# Patient Record
Sex: MALE | Race: BLACK OR AFRICAN AMERICAN | Employment: UNEMPLOYED | URBAN - METROPOLITAN AREA
[De-identification: names, ages, dates, MRNs, and addresses within clinical notes are randomized per-mention and may not be internally consistent; named-entity substitution may affect disease eponyms.]

---

## 2017-04-05 ENCOUNTER — APPOINTMENT (OUTPATIENT)
Dept: GENERAL RADIOLOGY | Age: 32
End: 2017-04-05
Attending: EMERGENCY MEDICINE
Payer: MEDICAID

## 2017-04-05 ENCOUNTER — HOSPITAL ENCOUNTER (EMERGENCY)
Age: 32
Discharge: HOME | End: 2017-04-05
Attending: EMERGENCY MEDICINE
Payer: MEDICAID

## 2017-04-05 ENCOUNTER — APPOINTMENT (OUTPATIENT)
Dept: CT IMAGING | Age: 32
End: 2017-04-05
Attending: EMERGENCY MEDICINE
Payer: MEDICAID

## 2017-04-05 VITALS
HEIGHT: 77 IN | BODY MASS INDEX: 23.14 KG/M2 | HEART RATE: 70 BPM | TEMPERATURE: 97.9 F | WEIGHT: 196 LBS | SYSTOLIC BLOOD PRESSURE: 149 MMHG | RESPIRATION RATE: 16 BRPM | DIASTOLIC BLOOD PRESSURE: 89 MMHG | OXYGEN SATURATION: 98 %

## 2017-04-05 DIAGNOSIS — R60.0 BILATERAL EDEMA OF LOWER EXTREMITY: ICD-10-CM

## 2017-04-05 DIAGNOSIS — R79.89 ELEVATED LFTS: Primary | ICD-10-CM

## 2017-04-05 LAB
ALBUMIN SERPL BCP-MCNC: 3.2 G/DL (ref 3.5–5)
ALBUMIN/GLOB SERPL: 1 {RATIO} (ref 1.1–2.2)
ALP SERPL-CCNC: 76 U/L (ref 45–117)
ALT SERPL-CCNC: 165 U/L (ref 12–78)
ANION GAP BLD CALC-SCNC: 8 MMOL/L (ref 5–15)
APPEARANCE UR: CLEAR
AST SERPL W P-5'-P-CCNC: 91 U/L (ref 15–37)
ATRIAL RATE: 58 BPM
BACTERIA URNS QL MICRO: NEGATIVE /HPF
BASOPHILS # BLD AUTO: 0 K/UL (ref 0–0.1)
BASOPHILS # BLD: 0 % (ref 0–1)
BILIRUB SERPL-MCNC: 0.5 MG/DL (ref 0.2–1)
BILIRUB UR QL: NEGATIVE
BUN SERPL-MCNC: 6 MG/DL (ref 6–20)
BUN/CREAT SERPL: 8 (ref 12–20)
CALCIUM SERPL-MCNC: 8.2 MG/DL (ref 8.5–10.1)
CALCULATED P AXIS, ECG09: 52 DEGREES
CALCULATED R AXIS, ECG10: 0 DEGREES
CALCULATED T AXIS, ECG11: 20 DEGREES
CHLORIDE SERPL-SCNC: 107 MMOL/L (ref 97–108)
CO2 SERPL-SCNC: 25 MMOL/L (ref 21–32)
COLOR UR: NORMAL
CREAT SERPL-MCNC: 0.8 MG/DL (ref 0.7–1.3)
D DIMER PPP FEU-MCNC: 1 MG/L FEU (ref 0–0.65)
DIAGNOSIS, 93000: NORMAL
EOSINOPHIL # BLD: 0.3 K/UL (ref 0–0.4)
EOSINOPHIL NFR BLD: 3 % (ref 0–7)
EPITH CASTS URNS QL MICRO: NORMAL /LPF
ERYTHROCYTE [DISTWIDTH] IN BLOOD BY AUTOMATED COUNT: 14.6 % (ref 11.5–14.5)
GLOBULIN SER CALC-MCNC: 3.3 G/DL (ref 2–4)
GLUCOSE SERPL-MCNC: 81 MG/DL (ref 65–100)
GLUCOSE UR STRIP.AUTO-MCNC: NEGATIVE MG/DL
HCT VFR BLD AUTO: 35.9 % (ref 36.6–50.3)
HGB BLD-MCNC: 11.4 G/DL (ref 12.1–17)
HGB UR QL STRIP: NEGATIVE
HYALINE CASTS URNS QL MICRO: NORMAL /LPF (ref 0–5)
KETONES UR QL STRIP.AUTO: NEGATIVE MG/DL
LEUKOCYTE ESTERASE UR QL STRIP.AUTO: NEGATIVE
LYMPHOCYTES # BLD AUTO: 29 % (ref 12–49)
LYMPHOCYTES # BLD: 2.5 K/UL (ref 0.8–3.5)
MCH RBC QN AUTO: 31.8 PG (ref 26–34)
MCHC RBC AUTO-ENTMCNC: 31.8 G/DL (ref 30–36.5)
MCV RBC AUTO: 100 FL (ref 80–99)
MONOCYTES # BLD: 1 K/UL (ref 0–1)
MONOCYTES NFR BLD AUTO: 12 % (ref 5–13)
NEUTS SEG # BLD: 4.8 K/UL (ref 1.8–8)
NEUTS SEG NFR BLD AUTO: 56 % (ref 32–75)
NITRITE UR QL STRIP.AUTO: NEGATIVE
P-R INTERVAL, ECG05: 128 MS
PH UR STRIP: 6 [PH] (ref 5–8)
PLATELET # BLD AUTO: 137 K/UL (ref 150–400)
POTASSIUM SERPL-SCNC: 4 MMOL/L (ref 3.5–5.1)
PROT SERPL-MCNC: 6.5 G/DL (ref 6.4–8.2)
PROT UR STRIP-MCNC: NEGATIVE MG/DL
Q-T INTERVAL, ECG07: 428 MS
QRS DURATION, ECG06: 80 MS
QTC CALCULATION (BEZET), ECG08: 420 MS
RBC # BLD AUTO: 3.59 M/UL (ref 4.1–5.7)
RBC #/AREA URNS HPF: NORMAL /HPF (ref 0–5)
SODIUM SERPL-SCNC: 140 MMOL/L (ref 136–145)
SP GR UR REFRACTOMETRY: 1.02 (ref 1–1.03)
UROBILINOGEN UR QL STRIP.AUTO: 1 EU/DL (ref 0.2–1)
VENTRICULAR RATE, ECG03: 58 BPM
WBC # BLD AUTO: 8.7 K/UL (ref 4.1–11.1)
WBC URNS QL MICRO: NORMAL /HPF (ref 0–4)

## 2017-04-05 PROCEDURE — 85379 FIBRIN DEGRADATION QUANT: CPT | Performed by: EMERGENCY MEDICINE

## 2017-04-05 PROCEDURE — 85025 COMPLETE CBC W/AUTO DIFF WBC: CPT | Performed by: EMERGENCY MEDICINE

## 2017-04-05 PROCEDURE — 96361 HYDRATE IV INFUSION ADD-ON: CPT

## 2017-04-05 PROCEDURE — 93970 EXTREMITY STUDY: CPT

## 2017-04-05 PROCEDURE — 80053 COMPREHEN METABOLIC PANEL: CPT | Performed by: EMERGENCY MEDICINE

## 2017-04-05 PROCEDURE — 74011250636 HC RX REV CODE- 250/636: Performed by: EMERGENCY MEDICINE

## 2017-04-05 PROCEDURE — 74011636320 HC RX REV CODE- 636/320: Performed by: RADIOLOGY

## 2017-04-05 PROCEDURE — 96360 HYDRATION IV INFUSION INIT: CPT

## 2017-04-05 PROCEDURE — 36415 COLL VENOUS BLD VENIPUNCTURE: CPT | Performed by: EMERGENCY MEDICINE

## 2017-04-05 PROCEDURE — 71020 XR CHEST PA LAT: CPT

## 2017-04-05 PROCEDURE — 81001 URINALYSIS AUTO W/SCOPE: CPT | Performed by: EMERGENCY MEDICINE

## 2017-04-05 PROCEDURE — 71275 CT ANGIOGRAPHY CHEST: CPT

## 2017-04-05 PROCEDURE — 99283 EMERGENCY DEPT VISIT LOW MDM: CPT

## 2017-04-05 PROCEDURE — 93005 ELECTROCARDIOGRAM TRACING: CPT

## 2017-04-05 RX ORDER — SERTRALINE HYDROCHLORIDE 50 MG/1
50 TABLET, FILM COATED ORAL DAILY
Status: ON HOLD | COMMUNITY
End: 2022-06-23 | Stop reason: SDUPTHER

## 2017-04-05 RX ORDER — FUROSEMIDE 20 MG/1
20 TABLET ORAL DAILY
Qty: 5 TAB | Refills: 0 | Status: SHIPPED | OUTPATIENT
Start: 2017-04-05 | End: 2017-04-07 | Stop reason: SDUPTHER

## 2017-04-05 RX ADMIN — SODIUM CHLORIDE 500 ML: 900 INJECTION, SOLUTION INTRAVENOUS at 12:03

## 2017-04-05 RX ADMIN — IOPAMIDOL 100 ML: 755 INJECTION, SOLUTION INTRAVENOUS at 12:58

## 2017-04-05 NOTE — DISCHARGE INSTRUCTIONS
We hope that we have addressed all of your medical concerns. The examination and treatment you received in the Emergency Department were for an emergent problem and were not intended as complete care. It is important that you follow up with your healthcare provider(s) for ongoing care. If your symptoms worsen or do not improve as expected, and you are unable to reach your usual health care provider(s), you should return to the Emergency Department. Today's healthcare is undergoing tremendous change, and patient satisfaction surveys are one of the many tools to assess the quality of medical care. You may receive a survey from the [employer] regarding your experience in the Emergency Department. I hope that your experience has been completely positive, particularly the medical care that I provided. As such, please participate in the survey; anything less than excellent does not meet my expectations or intentions. [facility] and [address] [place] [address] participate in nationally recognized quality of care measures. If your blood pressure is greater than 120/80, as reported below, we urge that you seek medical care to address the potential of high blood pressure, commonly known as hypertension. Hypertension can be hereditary or can be caused by certain medical conditions, pain, stress, or \"white coat syndrome. \"       Please make an appointment with your health care provider(s) for follow up of your Emergency Department visit. VITALS:   Patient Vitals for the past 8 hrs:   Temp Pulse Resp BP SpO2   04/05/17 1310 - - 16 149/89 98 %   04/05/17 1002 97.9 °F (36.6 °C) 70 20 121/72 100 %          Thank you for allowing us to provide you with medical care today. We realize that you have many choices for your emergency care needs. Please choose us in the future for any continued health care needs.       Regards,           Mahin Caldwell, MD    [facility].   Office: [phone]            Recent Results (from the past 24 hour(s))   CBC WITH AUTOMATED DIFF    Collection Time: 04/05/17 10:52 AM   Result Value Ref Range    WBC 8.7 4.1 - 11.1 K/uL    RBC 3.59 (L) 4.10 - 5.70 M/uL    HGB 11.4 (L) 12.1 - 17.0 g/dL    HCT 35.9 (L) 36.6 - 50.3 %    .0 (H) 80.0 - 99.0 FL    MCH 31.8 26.0 - 34.0 PG    MCHC 31.8 30.0 - 36.5 g/dL    RDW 14.6 (H) 11.5 - 14.5 %    PLATELET 573 (L) 247 - 400 K/uL    NEUTROPHILS 56 32 - 75 %    LYMPHOCYTES 29 12 - 49 %    MONOCYTES 12 5 - 13 %    EOSINOPHILS 3 0 - 7 %    BASOPHILS 0 0 - 1 %    ABS. NEUTROPHILS 4.8 1.8 - 8.0 K/UL    ABS. LYMPHOCYTES 2.5 0.8 - 3.5 K/UL    ABS. MONOCYTES 1.0 0.0 - 1.0 K/UL    ABS. EOSINOPHILS 0.3 0.0 - 0.4 K/UL    ABS. BASOPHILS 0.0 0.0 - 0.1 K/UL   METABOLIC PANEL, COMPREHENSIVE    Collection Time: 04/05/17 10:52 AM   Result Value Ref Range    Sodium 140 136 - 145 mmol/L    Potassium 4.0 3.5 - 5.1 mmol/L    Chloride 107 97 - 108 mmol/L    CO2 25 21 - 32 mmol/L    Anion gap 8 5 - 15 mmol/L    Glucose 81 65 - 100 mg/dL    BUN 6 6 - 20 MG/DL    Creatinine 0.80 0.70 - 1.30 MG/DL    BUN/Creatinine ratio 8 (L) 12 - 20      GFR est AA >60 >60 ml/min/1.73m2    GFR est non-AA >60 >60 ml/min/1.73m2    Calcium 8.2 (L) 8.5 - 10.1 MG/DL    Bilirubin, total 0.5 0.2 - 1.0 MG/DL    ALT (SGPT) 165 (H) 12 - 78 U/L    AST (SGOT) 91 (H) 15 - 37 U/L    Alk.  phosphatase 76 45 - 117 U/L    Protein, total 6.5 6.4 - 8.2 g/dL    Albumin 3.2 (L) 3.5 - 5.0 g/dL    Globulin 3.3 2.0 - 4.0 g/dL    A-G Ratio 1.0 (L) 1.1 - 2.2     URINALYSIS W/MICROSCOPIC    Collection Time: 04/05/17 10:52 AM   Result Value Ref Range    Color YELLOW/STRAW      Appearance CLEAR CLEAR      Specific gravity 1.022 1.003 - 1.030      pH (UA) 6.0 5.0 - 8.0      Protein NEGATIVE  NEG mg/dL    Glucose NEGATIVE  NEG mg/dL    Ketone NEGATIVE  NEG mg/dL    Bilirubin NEGATIVE  NEG      Blood NEGATIVE  NEG      Urobilinogen 1.0 0.2 - 1.0 EU/dL    Nitrites NEGATIVE  NEG      Leukocyte Esterase NEGATIVE  NEG      WBC 0-4 0 - 4 /hpf    RBC 0-5 0 - 5 /hpf    Epithelial cells FEW FEW /lpf    Bacteria NEGATIVE  NEG /hpf    Hyaline cast 0-2 0 - 5 /lpf   D DIMER    Collection Time: 04/05/17 10:57 AM   Result Value Ref Range    D-dimer 1.00 (H) 0.00 - 0.65 mg/L FEU   EKG, 12 LEAD, INITIAL    Collection Time: 04/05/17 11:04 AM   Result Value Ref Range    Ventricular Rate 58 BPM    Atrial Rate 58 BPM    P-R Interval 128 ms    QRS Duration 80 ms    Q-T Interval 428 ms    QTC Calculation (Bezet) 420 ms    Calculated P Axis 52 degrees    Calculated R Axis 0 degrees    Calculated T Axis 20 degrees    Diagnosis       Sinus bradycardia  Otherwise normal ECG  No previous ECGs available         Xr Chest Pa Lat    Result Date: 4/5/2017  INDICATION: Back pain and swollen feet for 5 days FINDINGS: PA and lateral views of the chest demonstrate a normal cardiomediastinal silhouette and clear lungs bilaterally. The visualized osseous structures are unremarkable. IMPRESSION: Normal chest.     Cta Chest W Or W Wo Cont    Result Date: 4/5/2017  EXAM:  CTA CHEST W OR W WO CONT INDICATION:   elevated d-dimer travel sob. Bilateral feet swelling onset 4-5 days ago that is now moving up his legs, says it \"hurts to walk. \" Pt states his feet swelling is accompanied by chest pain a couple days ago (not currently), intermittent SOB, and lower back pain. COMPARISON: Chest x-ray 4/5/2017. CONTRAST:  90 mL of Isovue-370. TECHNIQUE: Precontrast  images were obtained to localize the volume for acquisition. Multislice helical CT arteriography was performed from the diaphragm to the thoracic inlet during uneventful rapid bolus intravenous contrast administration. Lung and soft tissue windows were generated. Coronal and sagittal images were generated and 3D post processing consisting of coronal maximum intensity images was performed.   CT dose reduction was achieved through use of a standardized protocol tailored for this examination and automatic exposure control for dose modulation. FINDINGS: The lungs are clear of mass, nodule, airspace disease or edema. The pulmonary arteries are well enhanced and no pulmonary emboli are identified. There is no mediastinal or hilar adenopathy or mass. The heart is normal in size without pericardial effusion. The aorta enhances normally without evidence of aneurysm or dissection. The visualized portions of the upper abdominal organs are normal.     IMPRESSION: No pulmonary embolus or other acute finding. Leg and Ankle Edema: Care Instructions  Your Care Instructions  Swelling in the legs, ankles, and feet is called edema. It is common after you sit or stand for a while. Long plane flights or car rides often cause swelling in the legs and feet. You may also have swelling if you have to stand for long periods of time at your job. Problems with the veins in the legs (varicose veins) and changes in hormones can also cause swelling. Sometimes the swelling in the ankles and feet is caused by a more serious problem, such as heart failure, infection, blood clots, or liver or kidney disease. Follow-up care is a key part of your treatment and safety. Be sure to make and go to all appointments, and call your doctor if you are having problems. Its also a good idea to know your test results and keep a list of the medicines you take. How can you care for yourself at home? · If your doctor gave you medicine, take it as prescribed. Call your doctor if you think you are having a problem with your medicine. · Whenever you are resting, raise your legs up. Try to keep the swollen area higher than the level of your heart. · Take breaks from standing or sitting in one position. ¨ Walk around to increase the blood flow in your lower legs. ¨ Move your feet and ankles often while you stand, or tighten and relax your leg muscles. · Wear support stockings.  Put them on in the morning, before swelling gets worse. · Eat a balanced diet. Lose weight if you need to. · Limit the amount of salt (sodium) in your diet. Salt holds fluid in the body and may increase swelling. When should you call for help? Call 911 anytime you think you may need emergency care. For example, call if:  · You have symptoms of a blood clot in your lung (called a pulmonary embolism). These may include:  ¨ Sudden chest pain. ¨ Trouble breathing. ¨ Coughing up blood. Call your doctor now or seek immediate medical care if:  · You have signs of a blood clot, such as:  ¨ Pain in your calf, back of the knee, thigh, or groin. ¨ Redness and swelling in your leg or groin. · You have symptoms of infection, such as:  ¨ Increased pain, swelling, warmth, or redness. ¨ Red streaks or pus. ¨ A fever. Watch closely for changes in your health, and be sure to contact your doctor if:  · Your swelling is getting worse. · You have new or worsening pain in your legs. · You do not get better as expected. Where can you learn more? Go to http://cara-latonya.info/. Enter X738 in the search box to learn more about \"Leg and Ankle Edema: Care Instructions. \"  Current as of: May 27, 2016  Content Version: 11.2  © 0921-4567 [employer]. Care instructions adapted under license by [employer] (which disclaims liability or warranty for this information). If you have questions about a medical condition or this instruction, always ask your healthcare professional. [place] [address] any warranty or liability for your use of this information.

## 2017-04-05 NOTE — ED PROVIDER NOTES
HPI Comments: 36 y.o. male with past medical history significant for depression, anxiety, bipolar disorder, and childhood asthma who presents to the ED with chief complaint of feet swelling. Pt reports bilateral feet swelling onset 4-5 days ago that is now moving up his legs, says it \"hurts to walk. \" Pt states his feet swelling is accompanied by chest pain a couple days ago (not currently), intermittent SOB, and lower back pain. Pt states his back pain does not radiate and is exacerbated by bending. Pt denies hx of kidney or liver issues. Pt denies hx of EtOH withdrawal sx. Pt denies any recent travels (last flew on plane in January). Pt denies urinary sx, bowel issues, blood in stool, or hematuria. Denies bowel or bladder incontinence. There are no other acute medical complaints voiced at this time. Social Hx: Uses EtOH (3-4 drinks per week). Smoker. PCP: None    Note written by Lashae Henriquez, as dictated by Dalila Jurado MD 10:51 AM     The history is provided by the patient and a relative. Past Medical History:   Diagnosis Date    Anxiety     Bipolar 1 disorder ([facility] [address]     Depression        No past surgical history on file. No family history on file. Social History     Social History    Marital status:      Spouse name: N/A    Number of children: N/A    Years of education: N/A     Occupational History    Not on file. Social History Main Topics    Smoking status: Never Smoker    Smokeless tobacco: Not on file    Alcohol use No    Drug use: Not on file    Sexual activity: Not on file     Other Topics Concern    Not on file     Social History Narrative    No narrative on file         ALLERGIES: Peanut and Tree nut    Review of Systems   Respiratory: Positive for shortness of breath. Cardiovascular: Positive for chest pain and leg swelling. Gastrointestinal: Negative for blood in stool, constipation and diarrhea.    Genitourinary: Negative for decreased urine volume, difficulty urinating, dysuria, hematuria and urgency. Musculoskeletal: Positive for back pain (lower). All other systems reviewed and are negative. Vitals:    04/05/17 1002   BP: 121/72   Pulse: 70   Resp: 20   Temp: 97.9 °F (36.6 °C)   SpO2: 100%   Weight: 88.9 kg (196 lb)   Height: 6' 5\" (1.956 m)            Physical Exam   Constitutional: He is oriented to person, place, and time. He appears well-developed and well-nourished. No distress. HENT:   Head: Normocephalic and atraumatic. Eyes: Conjunctivae are normal.   Neck: Normal range of motion. Neck supple. Cardiovascular: Normal rate, regular rhythm, normal heart sounds and intact distal pulses. Exam reveals no friction rub. No murmur heard. Pulmonary/Chest: Effort normal and breath sounds normal. No respiratory distress. He has no wheezes. He has no rales. He exhibits no tenderness. Abdominal: Soft. Bowel sounds are normal. He exhibits no distension. There is no tenderness. There is no rebound and no guarding. Musculoskeletal: He exhibits no edema or tenderness. No midline T or L spine ttp or step off; 2+ pitting edema b/l LE   Neurological: He is alert and oriented to person, place, and time. No cranial nerve deficit. Coordination normal.   Skin: Skin is warm and dry. He is not diaphoretic. No pallor. Psychiatric: He has a normal mood and affect. His behavior is normal.   Nursing note and vitals reviewed. MDM  Number of Diagnoses or Management Options  Bilateral edema of lower extremity:   Elevated LFTs:   Diagnosis management comments: Check creatinine and urine for nephritic or nephrotic syndrome. Could be liver disease as well though pt reports doesn't drink often.  Given cp and sob eval for fluid overload and will check ddimer though low suspicion for dvt or PE       Amount and/or Complexity of Data Reviewed  Clinical lab tests: ordered and reviewed  Tests in the radiology section of CPT®: ordered and reviewed  Obtain history from someone other than the patient: yes (Sig other)  Independent visualization of images, tracings, or specimens: yes (ekg)    Patient Progress  Patient progress: stable    ED Course       Procedures    EKG interpretation: (Preliminary)  Rhythm: normal sinus rhythm; and regular . Rate (approx.): 60; Axis: normal; P wave: normal; QRS interval: normal ; ST/T wave: non-specific changes;      PROGRESS NOTE:  1:15 PM   Pt denies taking Tylenol at all. Says he used to drink daily and has cut back since. Discussed that sx are likely liver related and will need to f/u with GI and have biopsy. Spoke with pt and significant other. Suggest follow up with cardiology for echo but concern for liver disease. Lasix for a few days and compression hose. He needs further testing and likely liver bx. Does not have pcp so will refer as well.  Return if worsening sx

## 2017-04-05 NOTE — ED NOTES
Patient arrived through triage c/o bilateral leg swelling and pain. Patient does travel by plane often. Patient used to be an everyday drinker now states he drinks occasionally. Patient is resting in clarie bed in view of nurses.

## 2017-04-05 NOTE — PROCEDURES
[address]  *** FINAL REPORT ***    Name: Erwin Fierro  MRN: [id number]    Outpatient  : 1985  HIS Order #: [id number]  [address] [facility] Visit #: [id number]  Date: 2017    TYPE OF TEST: Peripheral Venous Testing    REASON FOR TEST  Limb swelling    Right Leg:-  Deep venous thrombosis:           No  Superficial venous thrombosis:    No  Deep venous insufficiency:        No  Superficial venous insufficiency: No    Left Leg:-  Deep venous thrombosis:           No  Superficial venous thrombosis:    No  Deep venous insufficiency:        No  Superficial venous insufficiency: No      INTERPRETATION/FINDINGS  PROCEDURE:  BILATERAL LE VENOUS DUPLEX. Evaluation of lower extremity veins with ultrasound (B-mode imaging,  pulsed Doppler, color Doppler). Includes the common femoral, deep  femoral, femoral, popliteal, posterior tibial, peroneal, and great  saphenous veins. FINDINGS:  Jade Birch scale and color flow duplex images of the veins in  both lower extremities demonstrate normal compressibility, spontaneous   and augmented flow profiles, and absence of filling defects  throughout the deep and superficial veins in both lower extremities. CONCLUSION:   Technically difficult study due to beins size and limb  swelling. Bilateral lower extremity venous duplex negative for deep  venous thrombosis or thrombophlebitis. ADDITIONAL COMMENTS    I have personally reviewed the data relevant to the interpretation of  this  study. TECHNOLOGIST: Noemi Cervantes RVT  Signed: 2017 12:53 PM    PHYSICIAN: Sarahi Delong.  Tien Singletary MD  Signed: 2017 12:57 PM

## 2017-04-05 NOTE — ED NOTES
Dr. Estefany Montaño reviewed discharge instructions with the patient. The patient verbalized understanding. All questions and concerns were addressed. The patient declined a wheelchair and is discharged ambulatory in the care of family members with instructions and prescriptions in hand. Pt is alert and oriented x 4. Respirations are clear and unlabored.

## 2017-04-07 ENCOUNTER — OFFICE VISIT (OUTPATIENT)
Dept: CARDIOLOGY CLINIC | Age: 32
End: 2017-04-07

## 2017-04-07 VITALS
HEIGHT: 77 IN | OXYGEN SATURATION: 99 % | WEIGHT: 202.6 LBS | SYSTOLIC BLOOD PRESSURE: 104 MMHG | DIASTOLIC BLOOD PRESSURE: 68 MMHG | HEART RATE: 68 BPM | RESPIRATION RATE: 20 BRPM | BODY MASS INDEX: 23.92 KG/M2

## 2017-04-07 DIAGNOSIS — R60.9 EDEMA, UNSPECIFIED TYPE: Primary | ICD-10-CM

## 2017-04-07 DIAGNOSIS — F10.10 ETOH ABUSE: ICD-10-CM

## 2017-04-07 RX ORDER — OLANZAPINE 20 MG/1
20 TABLET ORAL
COMMUNITY
End: 2017-05-15

## 2017-04-07 RX ORDER — FUROSEMIDE 20 MG/1
20 TABLET ORAL DAILY
Qty: 30 TAB | Refills: 6 | Status: SHIPPED | OUTPATIENT
Start: 2017-04-07 | End: 2017-04-10 | Stop reason: SDUPTHER

## 2017-04-07 NOTE — MR AVS SNAPSHOT
Visit Information Date & Time Provider Department Dept. Phone Encounter #  
 4/7/2017 10:00 AM Jason Cobb MD [facility] [phone] [id number] Your Appointments 5/22/2017  9:25 AM  
New Patient with Rojelio Pierre MD  
[facility]) Appt Note: Np est PCP  
 [address] [place] [address] [address] [place] [phone]  
  
   
 [address] [place] [phone] Upcoming Health Maintenance Date Due DTaP/Tdap/Td series (1 - Tdap) 4/12/2008 INFLUENZA AGE 9 TO ADULT 8/1/2016 Allergies as of 4/7/2017  Review Complete On: 4/7/2017 By: Hassel Gaucher Severity Noted Reaction Type Reactions Peanut  09/09/2016    Swelling Tree Nut  09/09/2016    Swelling Current Immunizations  Never Reviewed No immunizations on file. Not reviewed this visit Vitals BP Pulse Resp Height(growth percentile) Weight(growth percentile) SpO2  
 104/68 (BP 1 Location: Left arm) 68 20 6' 5\" (1.956 m) 202 lb 9.6 oz (91.9 kg) 99% BMI Smoking Status 24.02 kg/m2 Current Every Day Smoker Vitals History BMI and BSA Data Body Mass Index Body Surface Area 24.02 kg/m 2 2.23 m 2 Your Updated Medication List  
  
   
This list is accurate as of: 4/7/17 10:31 AM.  Always use your most recent med list.  
  
  
  
  
 Comp. Stocking,Thigh,Long,Large Misc  
1 Package by Does Not Apply route daily. furosemide 20 mg tablet Commonly known as:  LASIX Take 1 Tab by mouth daily. OLANZapine 20 mg tablet Commonly known as:  ZyPREXA Take 20 mg by mouth nightly. ZOLOFT 50 mg tablet Generic drug:  sertraline Take  by mouth daily. Introducing [facility]! [employer] introduces [employer] patient portal. Now you can access parts of your medical record, email your doctor's office, and request medication refills online. 1. In your internet browser, go to [email or web address] 2. Click on the First Time User? Click Here link in the Sign In box. You will see the New Member Sign Up page. 3. Enter your [employer] Access Code exactly as it appears below. You will not need to use this code after youve completed the sign-up process. If you do not sign up before the expiration date, you must request a new code. · Euro[employer] Access Code: [email or web address] Expires: 7/4/2017 11:20 AM 
 
4. Enter the last four digits of your Social Security Number (xxxx) and Date of Birth (mm/dd/yyyy) as indicated and click Submit. You will be taken to the next sign-up page. 5. Create a Inverted Edget ID. This will be your Eurotri login ID and cannot be changed, so think of one that is secure and easy to remember. 6. Create a Eurotri password. You can change your password at any time. 7. Enter your Password Reset Question and Answer. This can be used at a later time if you forget your password. 8. Enter your e-mail address. You will receive e-mail notification when new information is available in [address] 9. Click Sign Up. You can now view and download portions of your medical record. 10. Click the Download Summary menu link to download a portable copy of your medical information. If you have questions, please visit the Frequently Asked Questions section of the [employer] website. Remember, [employer] is NOT to be used for urgent needs. For medical emergencies, dial 911. Now available from your iPhone and Android! Please provide this summary of care documentation to your next provider. Your primary care clinician is listed as NONE. If you have any questions after today's visit, please call [phone].

## 2017-04-07 NOTE — PROGRESS NOTES
yDlon Ware MD    [id number] [facility] [place], [address] [facility]    Office [phone],CKT [phone]  Pager [phone]    Jennie Alvarado is a 36 y.o. male is here for f/u visit for    Primary care physician:  None    There is no problem list on file for this patient. Dear Dr. HAND had the pleasure of seeing  Jennie Alvarado in the office today. Chief complaint:  Chief Complaint   Patient presents with   [facility] Follow Up    Leg Swelling       Assessment:  Edema  Elevated LFT's  ETOH use      Plan:     Patient understands the plan. All questions were answered to the patient's satisfaction. Medication Side Effects and Warnings were discussed with patient: yes  Patient Labs were reviewed and or requested:  yes  Patient Past Records were reviewed and or requested: yes    I appreciate the opportunity to be involved in [address] See note below for details. Please do not hesitate to contact us with questions or concerns. Dylon Ware MD    Cardiac Testing/ Procedures: A. Cardiac Cath/PCI:    B.ECHO/YULISSA:    C.StressNuclear/Stress ECHO/Stress test:    D.Vascular:    E. EP:    F. Miscellaneous:    Subjective:  Jennie Alvarado is a 36 y.o. male who is here for evaluation of edema. ROS:  (bold if positive, if negative)             Medications before admission:    Current Outpatient Prescriptions   Medication Sig Dispense    OLANZapine (ZYPREXA) 20 mg tablet Take 20 mg by mouth nightly.  sertraline (ZOLOFT) 50 mg tablet Take  by mouth daily.  furosemide (LASIX) 20 mg tablet Take 1 Tab by mouth daily. 5 Tab    Comp. Stocking,Thigh,Long,Large misc 1 Package by Does Not Apply route daily. 1 Package     No current facility-administered medications for this visit.         Family History of CAD:    Yes/No    Social History:  Current  Smoker  Yes/No    Physical Exam:  Visit Vitals    /68 (BP 1 Location: Left arm)    Pulse 68    Resp 20    Ht 6' 5\" (1.956 m)    Wt 202 lb 9.6 oz (91.9 kg)    SpO2 99%    BMI 24.02 kg/m2          Gen: Well-developed, well-nourished, in no acute distress  Neck: Supple,No JVD, No Carotid Bruit,   Resp: No accessory muscle use, Clear breath sounds, No rales or rhonchi  Card: Regular Rate,Rythm,Normal S1, S2, No murmurs, rubs or gallop. No thrills.    Abd:  Soft, non-tender, non-distended,BS+,   MSK: No cyanosis  Skin: No rashes    Neuro: moving all four extremities , follows commands appropriately  Psych:  Good insight, oriented to person, place , alert, Nml Affect  LE: No edema    EKG:   Results for orders placed or performed during the hospital encounter of 04/05/17   EKG, 12 LEAD, INITIAL   Result Value Ref Range    Ventricular Rate 58 BPM    Atrial Rate 58 BPM    P-R Interval 128 ms    QRS Duration 80 ms    Q-T Interval 428 ms    QTC Calculation (Bezet) 420 ms    Calculated P Axis 52 degrees    Calculated R Axis 0 degrees    Calculated T Axis 20 degrees    Diagnosis       Sinus bradycardia  Otherwise normal ECG  No previous ECGs available  Confirmed by Trenna Litten MD., Briana ([id number]) on 4/5/2017 8:08:15 PM           LABS:        Lab Results   Component Value Date/Time    WBC 8.7 04/05/2017 10:52 AM    HGB 11.4 04/05/2017 10:52 AM    HCT 35.9 04/05/2017 10:52 AM    PLATELET 137 21/67/6768 10:52 AM     Lab Results   Component Value Date/Time    Sodium 140 04/05/2017 10:52 AM    Potassium 4.0 04/05/2017 10:52 AM    Chloride 107 04/05/2017 10:52 AM    CO2 25 04/05/2017 10:52 AM    Anion gap 8 04/05/2017 10:52 AM    Glucose 81 04/05/2017 10:52 AM    BUN 6 04/05/2017 10:52 AM    Creatinine 0.80 04/05/2017 10:52 AM    BUN/Creatinine ratio 8 04/05/2017 10:52 AM    GFR est AA >60 04/05/2017 10:52 AM    GFR est non-AA >60 04/05/2017 10:52 AM    Calcium 8.2 04/05/2017 10:52 AM       No results found for: APTT  No results found for: INR, PTMR, PTP, PT1, PT2  No components found for: Ritu Gill MD

## 2017-04-10 NOTE — PROGRESS NOTES
Theo Coreas MD    [id number] [place], [address] [facility]    Office [phone],[facility] [phone]  Pager [phone]    Austin Artis is a 36 y.o. male is here for evaluation of edema after recent ED visit    Primary care physician:  None    There is no problem list on file for this patient. Chief complaint:  Chief Complaint   Patient presents with   [facility] Follow Up    Leg Swelling       Assessment:  Edema  Elevated LFT's  Excessive ETOH use      Plan:     Echo   Lasix 20 mg daily. Advised to quit/cut back on Etoh intake  Recently in ED- Cr nml/CTA - no PE  F/u in 3-4 wks      Patient understands the plan. All questions were answered to the patient's satisfaction. Medication Side Effects and Warnings were discussed with patient: yes  Patient Labs were reviewed and or requested:  yes  Patient Past Records were reviewed and or requested: yes    I appreciate the opportunity to be involved in [address] See note below for details. Please do not hesitate to contact us with questions or concerns. Theo Coreas MD    Cardiac Testing/ Procedures: A. Cardiac Cath/PCI:    B.ECHO/YULISSA:    C.StressNuclear/Stress ECHO/Stress test:    D.Vascular:    E. EP:    F. Miscellaneous:    Subjective:  Austin Artis is a 36 y.o. male who has been having swelling lower extremities and went to the emergency room. He had elevated LFTs. He was advised to follow-up with cardiology. No chest pain, dizziness, syncope, dyspnea. Patient is visiting his mother and lives in [facility] with his wife. He admits to drinking alcohol daily including drinking in the morning. No prior cardiac history. ROS:  (bold if positive, if negative)             Medications before admission:    Current Outpatient Prescriptions   Medication Sig Dispense    OLANZapine (ZYPREXA) 20 mg tablet Take 20 mg by mouth nightly.  sertraline (ZOLOFT) 50 mg tablet Take  by mouth daily.      furosemide (LASIX) 20 mg tablet Take 1 Tab by mouth daily. 5 Tab    Comp. Stocking,Thigh,Long,Large misc 1 Package by Does Not Apply route daily. 1 Package     No current facility-administered medications for this visit. Family History of CAD:    No    Social History:  Current  Smoker no    Physical Exam:  Visit Vitals    /68 (BP 1 Location: Left arm)    Pulse 68    Resp 20    Ht 6' 5\" (1.956 m)    Wt 202 lb 9.6 oz (91.9 kg)    SpO2 99%    BMI 24.02 kg/m2          Gen: Well-developed, well-nourished, in no acute distress  Neck: Supple,No JVD, No Carotid Bruit,   Resp: No accessory muscle use, Clear breath sounds, No rales or rhonchi  Card: Regular Rate,Rythm,Normal S1, S2, No murmurs, rubs or gallop. No thrills.    Abd:  Soft, non-tender, non-distended,BS+,   MSK: No cyanosis  Skin: No rashes    Neuro: moving all four extremities , follows commands appropriately  Psych:  Good insight, oriented to person, place , alert, Nml Affect  LE : 1+edema    EKG:   Results for orders placed or performed during the hospital encounter of 04/05/17   EKG, 12 LEAD, INITIAL   Result Value Ref Range    Ventricular Rate 58 BPM    Atrial Rate 58 BPM    P-R Interval 128 ms    QRS Duration 80 ms    Q-T Interval 428 ms    QTC Calculation (Bezet) 420 ms    Calculated P Axis 52 degrees    Calculated R Axis 0 degrees    Calculated T Axis 20 degrees    Diagnosis       Sinus bradycardia  Otherwise normal ECG  No previous ECGs available  Confirmed by Brittany Brooks MD., Briana ([id number]) on 4/5/2017 8:08:15 PM           LABS:        Lab Results   Component Value Date/Time    WBC 8.7 04/05/2017 10:52 AM    HGB 11.4 04/05/2017 10:52 AM    HCT 35.9 04/05/2017 10:52 AM    PLATELET 077 48/20/4315 10:52 AM     Lab Results   Component Value Date/Time    Sodium 140 04/05/2017 10:52 AM    Potassium 4.0 04/05/2017 10:52 AM    Chloride 107 04/05/2017 10:52 AM    CO2 25 04/05/2017 10:52 AM    Anion gap 8 04/05/2017 10:52 AM    Glucose 81 04/05/2017 10:52 AM BUN 6 04/05/2017 10:52 AM    Creatinine 0.80 04/05/2017 10:52 AM    BUN/Creatinine ratio 8 04/05/2017 10:52 AM    GFR est AA >60 04/05/2017 10:52 AM    GFR est non-AA >60 04/05/2017 10:52 AM    Calcium 8.2 04/05/2017 10:52 AM       No results found for: APTT  No results found for: INR, PTMR, PTP, PT1, PT2  No components found for: Tony Rowland MD

## 2017-05-04 ENCOUNTER — CLINICAL SUPPORT (OUTPATIENT)
Dept: CARDIOLOGY CLINIC | Age: 32
End: 2017-05-04

## 2017-05-04 DIAGNOSIS — R60.9 EDEMA, UNSPECIFIED TYPE: Primary | ICD-10-CM

## 2017-05-15 ENCOUNTER — OFFICE VISIT (OUTPATIENT)
Dept: CARDIOLOGY CLINIC | Age: 32
End: 2017-05-15

## 2017-05-15 VITALS
HEIGHT: 77 IN | BODY MASS INDEX: 23 KG/M2 | HEART RATE: 72 BPM | DIASTOLIC BLOOD PRESSURE: 84 MMHG | SYSTOLIC BLOOD PRESSURE: 122 MMHG | RESPIRATION RATE: 16 BRPM | OXYGEN SATURATION: 99 % | WEIGHT: 194.8 LBS

## 2017-05-15 DIAGNOSIS — R60.9 EDEMA, UNSPECIFIED TYPE: Primary | ICD-10-CM

## 2017-05-15 DIAGNOSIS — K74.5 BILIARY CIRRHOSIS (HCC): ICD-10-CM

## 2017-05-15 NOTE — PROGRESS NOTES
Vinod Paulino MD    [id number] [place], [address] [facility]    Office [phone],[facility] [phone]  Pager [phone]    Babar Argueta is a 29 y.o. male is here for f/u visit     Primary care physician:  Abdullahi Moreira MD    There is no problem list on file for this patient. Dear Dr. Nona Wick,     I had the pleasure of seeing Mr. Babar Argueta in the office today. Chief complaint:  Chief Complaint   Patient presents with    Follow-up    Leg Swelling       Assessment:  Edema  Elevated LFT's-Patient has seen Dr. Tien Velarde.  He has been told that he has cirrhosis only \"15 % of the liver is working\". Dr. Brooklynn Gabriel wants still continue Lasix. ETOH use-patient stated that he has quit drinking. Plan:     Patient's LVEF is 55%. His symptoms are possibly secondary to cirrhosis. Follow-up in 6 months or earlier as needed. Patient has a follow-up appointment with Dr. Nona Wick. Patient understands the plan. All questions were answered to the patient's satisfaction. Medication Side Effects and Warnings were discussed with patient: yes  Patient Labs were reviewed and or requested:  yes  Patient Past Records were reviewed and or requested: yes    I appreciate the opportunity to be involved in [address] See note below for details. Please do not hesitate to contact us with questions or concerns. Vinod Paulino MD    Cardiac Testing/ Procedures: A. Cardiac Cath/PCI:    B.ECHO/YULISSA:    C.StressNuclear/Stress ECHO/Stress test:    D.Vascular:    E. EP:    F. Miscellaneous:    Subjective:  Babar Argueta is a 29 y.o. male who is here for follow-up visit. Patient has seen Dr. Tien Velarde.  He has been told that he has cirrhosis only \"15 % of the liver is working\". Patient states that the Lasix helped his swelling. He has discussed with Dr. Brooklynn Gabriel and Dr. Brooklynn Gabriel wants to continue the Lasix. He is also scheduled to see his primary care physician. Normal EF by echocardiogram.      ROS:  (bold if positive, if negative)             Medications before admission:    Current Outpatient Prescriptions   Medication Sig Dispense    furosemide (LASIX) 20 mg tablet Take 1 Tab by mouth daily. 90 Tab    sertraline (ZOLOFT) 50 mg tablet Take  by mouth daily.  Comp. Stocking,Thigh,Long,Large misc 1 Package by Does Not Apply route daily. 1 Package     No current facility-administered medications for this visit. Family History of CAD:    No    Social History:  Current  Smoker  Yes    Physical Exam:  Visit Vitals    /84 (BP 1 Location: Left arm)    Pulse 72    Resp 16    Ht 6' 5\" (1.956 m)    Wt 194 lb 12.8 oz (88.4 kg)    SpO2 99%    BMI 23.1 kg/m2          Gen: Well-developed, well-nourished, in no acute distress  Neck: Supple,No JVD, No Carotid Bruit,   Resp: No accessory muscle use, Clear breath sounds, No rales or rhonchi  Card: Regular Rate,Rythm,Normal S1, S2, No murmurs, rubs or gallop. No thrills.    Abd:  Soft, non-tender, non-distended,BS+,   MSK: No cyanosis  Skin: No rashes, multiple tattoos  Neuro: moving all four extremities , follows commands appropriately  Psych:  Good insight, oriented to person, place , alert, Nml Affect  LE: Trace edema    EKG:         LABS:        Lab Results   Component Value Date/Time    WBC 8.7 04/05/2017 10:52 AM    HGB 11.4 04/05/2017 10:52 AM    HCT 35.9 04/05/2017 10:52 AM    PLATELET 575 66/56/3036 10:52 AM     Lab Results   Component Value Date/Time    Sodium 140 04/05/2017 10:52 AM    Potassium 4.0 04/05/2017 10:52 AM    Chloride 107 04/05/2017 10:52 AM    CO2 25 04/05/2017 10:52 AM    Anion gap 8 04/05/2017 10:52 AM    Glucose 81 04/05/2017 10:52 AM    BUN 6 04/05/2017 10:52 AM    Creatinine 0.80 04/05/2017 10:52 AM    BUN/Creatinine ratio 8 04/05/2017 10:52 AM    GFR est AA >60 04/05/2017 10:52 AM    GFR est non-AA >60 04/05/2017 10:52 AM    Calcium 8.2 04/05/2017 10:52 AM       No results found for: APTT  No results found for: INR, PTMR, PTP, PT1, PT2  No components found for: Amy Arora MD

## 2017-05-15 NOTE — MR AVS SNAPSHOT
Visit Information Date & Time Provider Department Dept. Phone Encounter #  
 5/15/2017 10:20 AM Mitch Baer MD [facility] [phone] [id number] Your Appointments 5/22/2017  9:25 AM  
New Patient with Scooter Moulton MD  
[address] [facility]) Appt Note: Np est PCP  
 [address] [facility]  
[phone]  
  
   
 [address] [facility] 3 [facility] [phone] Upcoming Health Maintenance Date Due Pneumococcal 19-64 Medium Risk (1 of 1 - PPSV23) 4/12/2006 DTaP/Tdap/Td series (1 - Tdap) 4/12/2008 INFLUENZA AGE 9 TO ADULT 8/1/2017 Allergies as of 5/15/2017  Review Complete On: 5/15/2017 By: Sharan Counts Severity Noted Reaction Type Reactions Peanut  09/09/2016    Swelling Tree Nut  09/09/2016    Swelling Current Immunizations  Never Reviewed No immunizations on file. Not reviewed this visit Vitals BP Pulse Resp Height(growth percentile) Weight(growth percentile) SpO2  
 122/84 (BP 1 Location: Left arm) 72 16 6' 5\" (1.956 m) 194 lb 12.8 oz (88.4 kg) 99% BMI Smoking Status 23.1 kg/m2 Current Every Day Smoker Vitals History BMI and BSA Data Body Mass Index Body Surface Area  
 23.1 kg/m 2 2.19 m 2 Preferred Pharmacy Pharmacy Name Phone [facility] [address] [phone] Your Updated Medication List  
  
   
This list is accurate as of: 5/15/17 10:35 AM.  Always use your most recent med list.  
  
  
  
  
 Comp. Stocking,Thigh,Long,Large Misc  
1 Package by Does Not Apply route daily. furosemide 20 mg tablet Commonly known as:  LASIX Take 1 Tab by mouth daily. ZOLOFT 50 mg tablet Generic drug:  sertraline Take  by mouth daily. Introducing [facility]! Tyrone Webb introduces [employer] patient portal. Now you can access parts of your medical record, email your doctor's office, and request medication refills online. 1. In your internet browser, go to [email or web address] 2. Click on the First Time User? Click Here link in the Sign In box. You will see the New Member Sign Up page. 3. Enter your [employer] Access Code exactly as it appears below. You will not need to use this code after youve completed the sign-up process. If you do not sign up before the expiration date, you must request a new code. · [employer] Access Code: [email or web address] Expires: 7/4/2017 11:20 AM 
 
4. Enter the last four digits of your Social Security Number (xxxx) and Date of Birth (mm/dd/yyyy) as indicated and click Submit. You will be taken to the next sign-up page. 5. Create a [employer] ID. This will be your [employer] login ID and cannot be changed, so think of one that is secure and easy to remember. 6. Create a [employer] password. You can change your password at any time. 7. Enter your Password Reset Question and Answer. This can be used at a later time if you forget your password. 8. Enter your e-mail address. You will receive e-mail notification when new information is available in [address] 9. Click Sign Up. You can now view and download portions of your medical record. 10. Click the Download Summary menu link to download a portable copy of your medical information. If you have questions, please visit the Frequently Asked Questions section of the [employer] website. Remember, [employer] is NOT to be used for urgent needs. For medical emergencies, dial 911. Now available from your iPhone and Android! Please provide this summary of care documentation to your next provider. Your primary care clinician is listed as Kyree Daily. If you have any questions after today's visit, please call [phone].

## 2017-05-20 ENCOUNTER — APPOINTMENT (OUTPATIENT)
Dept: CT IMAGING | Age: 32
End: 2017-05-20
Attending: NURSE PRACTITIONER
Payer: MEDICAID

## 2017-05-20 ENCOUNTER — HOSPITAL ENCOUNTER (EMERGENCY)
Age: 32
Discharge: HOME | End: 2017-05-20
Attending: EMERGENCY MEDICINE
Payer: MEDICAID

## 2017-05-20 VITALS
BODY MASS INDEX: 21.66 KG/M2 | HEIGHT: 78 IN | WEIGHT: 187.17 LBS | HEART RATE: 54 BPM | TEMPERATURE: 97.7 F | OXYGEN SATURATION: 99 % | DIASTOLIC BLOOD PRESSURE: 68 MMHG | SYSTOLIC BLOOD PRESSURE: 116 MMHG | RESPIRATION RATE: 17 BRPM

## 2017-05-20 DIAGNOSIS — R10.9 CHRONIC ABDOMINAL PAIN: Primary | ICD-10-CM

## 2017-05-20 DIAGNOSIS — G89.29 CHRONIC ABDOMINAL PAIN: Primary | ICD-10-CM

## 2017-05-20 LAB
ALBUMIN SERPL BCP-MCNC: 4.2 G/DL (ref 3.5–5)
ALBUMIN/GLOB SERPL: 1.2 {RATIO} (ref 1.1–2.2)
ALP SERPL-CCNC: 61 U/L (ref 45–117)
ALT SERPL-CCNC: 32 U/L (ref 12–78)
ANION GAP BLD CALC-SCNC: 9 MMOL/L (ref 5–15)
APPEARANCE UR: CLEAR
APTT PPP: 32.3 SEC (ref 22.1–32.5)
AST SERPL W P-5'-P-CCNC: 25 U/L (ref 15–37)
BACTERIA URNS QL MICRO: NEGATIVE /HPF
BASOPHILS # BLD AUTO: 0 K/UL (ref 0–0.1)
BASOPHILS # BLD: 1 % (ref 0–1)
BILIRUB SERPL-MCNC: 1.2 MG/DL (ref 0.2–1)
BILIRUB UR QL: NEGATIVE
BUN SERPL-MCNC: 8 MG/DL (ref 6–20)
BUN/CREAT SERPL: 8 (ref 12–20)
CALCIUM SERPL-MCNC: 8.9 MG/DL (ref 8.5–10.1)
CHLORIDE SERPL-SCNC: 102 MMOL/L (ref 97–108)
CO2 SERPL-SCNC: 29 MMOL/L (ref 21–32)
COLOR UR: NORMAL
CREAT SERPL-MCNC: 1.01 MG/DL (ref 0.7–1.3)
EOSINOPHIL # BLD: 0.1 K/UL (ref 0–0.4)
EOSINOPHIL NFR BLD: 3 % (ref 0–7)
EPITH CASTS URNS QL MICRO: NORMAL /LPF
ERYTHROCYTE [DISTWIDTH] IN BLOOD BY AUTOMATED COUNT: 12.4 % (ref 11.5–14.5)
GLOBULIN SER CALC-MCNC: 3.5 G/DL (ref 2–4)
GLUCOSE SERPL-MCNC: 79 MG/DL (ref 65–100)
GLUCOSE UR STRIP.AUTO-MCNC: NEGATIVE MG/DL
HCT VFR BLD AUTO: 38.9 % (ref 36.6–50.3)
HGB BLD-MCNC: 13.8 G/DL (ref 12.1–17)
HGB UR QL STRIP: NEGATIVE
HYALINE CASTS URNS QL MICRO: NORMAL /LPF (ref 0–5)
INR PPP: 1.1 (ref 0.9–1.1)
KETONES UR QL STRIP.AUTO: NEGATIVE MG/DL
LEUKOCYTE ESTERASE UR QL STRIP.AUTO: NEGATIVE
LIPASE SERPL-CCNC: 98 U/L (ref 73–393)
LYMPHOCYTES # BLD AUTO: 45 % (ref 12–49)
LYMPHOCYTES # BLD: 2.5 K/UL (ref 0.8–3.5)
MCH RBC QN AUTO: 33.6 PG (ref 26–34)
MCHC RBC AUTO-ENTMCNC: 35.5 G/DL (ref 30–36.5)
MCV RBC AUTO: 94.6 FL (ref 80–99)
MONOCYTES # BLD: 0.5 K/UL (ref 0–1)
MONOCYTES NFR BLD AUTO: 9 % (ref 5–13)
NEUTS SEG # BLD: 2.3 K/UL (ref 1.8–8)
NEUTS SEG NFR BLD AUTO: 42 % (ref 32–75)
NITRITE UR QL STRIP.AUTO: NEGATIVE
PH UR STRIP: 6.5 [PH] (ref 5–8)
PLATELET # BLD AUTO: 184 K/UL (ref 150–400)
POTASSIUM SERPL-SCNC: 3.4 MMOL/L (ref 3.5–5.1)
PROT SERPL-MCNC: 7.7 G/DL (ref 6.4–8.2)
PROT UR STRIP-MCNC: NEGATIVE MG/DL
PROTHROMBIN TIME: 11.4 SEC (ref 9–11.1)
RBC # BLD AUTO: 4.11 M/UL (ref 4.1–5.7)
RBC #/AREA URNS HPF: NORMAL /HPF (ref 0–5)
SODIUM SERPL-SCNC: 140 MMOL/L (ref 136–145)
SP GR UR REFRACTOMETRY: 1.01 (ref 1–1.03)
THERAPEUTIC RANGE,PTTT: NORMAL SECS (ref 58–77)
UROBILINOGEN UR QL STRIP.AUTO: 0.2 EU/DL (ref 0.2–1)
WBC # BLD AUTO: 5.5 K/UL (ref 4.1–11.1)
WBC URNS QL MICRO: NORMAL /HPF (ref 0–4)

## 2017-05-20 PROCEDURE — 85025 COMPLETE CBC W/AUTO DIFF WBC: CPT | Performed by: NURSE PRACTITIONER

## 2017-05-20 PROCEDURE — 85610 PROTHROMBIN TIME: CPT | Performed by: NURSE PRACTITIONER

## 2017-05-20 PROCEDURE — 81001 URINALYSIS AUTO W/SCOPE: CPT | Performed by: NURSE PRACTITIONER

## 2017-05-20 PROCEDURE — 74011250636 HC RX REV CODE- 250/636: Performed by: NURSE PRACTITIONER

## 2017-05-20 PROCEDURE — 96361 HYDRATE IV INFUSION ADD-ON: CPT

## 2017-05-20 PROCEDURE — 83690 ASSAY OF LIPASE: CPT | Performed by: NURSE PRACTITIONER

## 2017-05-20 PROCEDURE — 85730 THROMBOPLASTIN TIME PARTIAL: CPT | Performed by: NURSE PRACTITIONER

## 2017-05-20 PROCEDURE — 36415 COLL VENOUS BLD VENIPUNCTURE: CPT | Performed by: NURSE PRACTITIONER

## 2017-05-20 PROCEDURE — 80053 COMPREHEN METABOLIC PANEL: CPT | Performed by: NURSE PRACTITIONER

## 2017-05-20 PROCEDURE — 96374 THER/PROPH/DIAG INJ IV PUSH: CPT

## 2017-05-20 PROCEDURE — 74011636320 HC RX REV CODE- 636/320: Performed by: RADIOLOGY

## 2017-05-20 PROCEDURE — 99285 EMERGENCY DEPT VISIT HI MDM: CPT

## 2017-05-20 PROCEDURE — 96375 TX/PRO/DX INJ NEW DRUG ADDON: CPT

## 2017-05-20 PROCEDURE — 74177 CT ABD & PELVIS W/CONTRAST: CPT

## 2017-05-20 RX ORDER — MORPHINE SULFATE 4 MG/ML
4 INJECTION, SOLUTION INTRAMUSCULAR; INTRAVENOUS
Status: COMPLETED | OUTPATIENT
Start: 2017-05-20 | End: 2017-05-20

## 2017-05-20 RX ORDER — PROMETHAZINE HYDROCHLORIDE 25 MG/1
25 TABLET ORAL
Qty: 12 TAB | Refills: 0 | Status: SHIPPED | OUTPATIENT
Start: 2017-05-20 | End: 2019-05-15

## 2017-05-20 RX ORDER — IBUPROFEN 800 MG/1
800 TABLET ORAL
Qty: 20 TAB | Refills: 0 | Status: SHIPPED | OUTPATIENT
Start: 2017-05-20 | End: 2017-05-27

## 2017-05-20 RX ORDER — ONDANSETRON 2 MG/ML
4 INJECTION INTRAMUSCULAR; INTRAVENOUS
Status: COMPLETED | OUTPATIENT
Start: 2017-05-20 | End: 2017-05-20

## 2017-05-20 RX ORDER — OXYCODONE HYDROCHLORIDE 5 MG/1
5 CAPSULE ORAL
Qty: 10 CAP | Refills: 0 | Status: SHIPPED | OUTPATIENT
Start: 2017-05-20 | End: 2019-05-15

## 2017-05-20 RX ORDER — SPIRONOLACTONE 100 MG/1
100 TABLET, FILM COATED ORAL DAILY
COMMUNITY
End: 2019-05-17

## 2017-05-20 RX ADMIN — SODIUM CHLORIDE 1000 ML: 900 INJECTION, SOLUTION INTRAVENOUS at 14:46

## 2017-05-20 RX ADMIN — ONDANSETRON 4 MG: 2 INJECTION INTRAMUSCULAR; INTRAVENOUS at 14:43

## 2017-05-20 RX ADMIN — IOPAMIDOL 100 ML: 755 INJECTION, SOLUTION INTRAVENOUS at 15:30

## 2017-05-20 RX ADMIN — Medication 4 MG: at 14:43

## 2017-05-20 NOTE — ED PROVIDER NOTES
HPI Comments: The patient is a 74-year-old male who presents to the emergency room via EMS with complaints of ongoing abdominal pain for the last 4 weeks most severe today, nausea, chills, and ongoing weight loss. Patient is currently being followed by Dr. Richar Anderson for hepatic cirrhosis presumably from EtOH use. Per the family the patient's liver is only functioning at 15%. Pt denies fevers,night sweats, chest pain, pressure, SOB, HAMM, PND, orthopnea, v/d, melena, hematuria, dysuria, constipation, HA, dizziness, and syncope. + Generalized body aches. Past Medical History:  No date: Anxiety  No date: Bipolar 1 disorder (HCC)  No date: Cirrhosis, alcoholic ([facility]  No date: Depression    History reviewed. No pertinent surgical history. PCP:  Kyree Bhardwaj MD        Patient is a 29 y.o. male presenting with abdominal pain, nausea, and chills. The history is provided by the patient. Abdominal Pain    Associated symptoms include nausea. Pertinent negatives include no fever, no vomiting, no constipation, no dysuria, no hematuria, no headaches, no arthralgias, no myalgias, no chest pain, no testicular pain and no back pain. Nausea    Associated symptoms include chills and abdominal pain. Pertinent negatives include no fever, no headaches, no arthralgias, no myalgias, no cough and no headaches. Chills    Pertinent negatives include no chest pain, no vomiting, no congestion, no headaches, no sore throat, no cough, no shortness of breath, no neck pain and no rash. Past Medical History:   Diagnosis Date    Anxiety     Bipolar 1 disorder ([facility]     Cirrhosis, alcoholic ([facility]     Depression        History reviewed. No pertinent surgical history. History reviewed. No pertinent family history. Social History     Social History    Marital status:      Spouse name: N/A    Number of children: N/A    Years of education: N/A     Occupational History    Not on file.      Social History Main Topics    Smoking status: Current Every Day Smoker    Smokeless tobacco: Not on file    Alcohol use No      Comment: socially/ April 2017 last drink    Drug use: No    Sexual activity: Not on file     Other Topics Concern    Not on file     Social History Narrative         ALLERGIES: Peanut and Tree nut    Review of Systems   Constitutional: Positive for chills and unexpected weight change. Negative for activity change, appetite change, diaphoresis, fatigue and fever. HENT: Negative for congestion, ear pain, rhinorrhea, sinus pressure, sore throat, tinnitus, trouble swallowing and voice change. Eyes: Negative for pain, discharge, redness and visual disturbance. Respiratory: Negative for apnea, cough, choking, chest tightness, shortness of breath, wheezing and stridor. Cardiovascular: Negative for chest pain, palpitations and leg swelling. Gastrointestinal: Positive for abdominal pain and nausea. Negative for constipation and vomiting. Endocrine: Negative for cold intolerance and heat intolerance. Genitourinary: Negative for difficulty urinating, dysuria, flank pain, hematuria, testicular pain and urgency. Musculoskeletal: Negative for arthralgias, back pain, gait problem, joint swelling, myalgias, neck pain and neck stiffness. Skin: Negative for color change, pallor, rash and wound. Allergic/Immunologic: Negative for immunocompromised state. Neurological: Negative for dizziness, tremors, syncope, weakness, light-headedness, numbness and headaches. Hematological: Does not bruise/bleed easily. Psychiatric/Behavioral: Negative for agitation, confusion and suicidal ideas. Vitals:    05/20/17 1411   BP: 132/69   Pulse: (!) 54   Resp: 17   Temp: 97.7 °F (36.5 °C)   SpO2: 99%   Weight: 84.9 kg (187 lb 2.7 oz)   Height: 6' 7\" (2.007 m)            Physical Exam   Constitutional: He is oriented to person, place, and time. He appears well-developed and well-nourished. No distress.    HENT:   Head: Atraumatic. Nose: Nose normal.   Mouth/Throat: No oropharyngeal exudate. Eyes: Conjunctivae and EOM are normal. Right eye exhibits no discharge. Left eye exhibits no discharge. No scleral icterus. Neck: Normal range of motion. Neck supple. No JVD present. No tracheal deviation present. No thyromegaly present. Cardiovascular: Normal rate, regular rhythm, normal heart sounds, intact distal pulses and normal pulses. PMI is not displaced. Exam reveals no gallop and no friction rub. No murmur heard. Pulmonary/Chest: Breath sounds normal. No accessory muscle usage or stridor. No respiratory distress. He has no decreased breath sounds. He has no wheezes. He has no rhonchi. He has no rales. He exhibits no tenderness. Abdominal: Soft. Bowel sounds are normal. He exhibits no distension and no mass. There is no hepatosplenomegaly. There is generalized tenderness. There is no rigidity, no rebound, no guarding, no CVA tenderness, no tenderness at McBurney's point and negative Mcdonald's sign. Musculoskeletal: Normal range of motion. He exhibits no edema or tenderness. Lymphadenopathy:     He has no cervical adenopathy. Neurological: He is alert and oriented to person, place, and time. Coordination normal. GCS eye subscore is 4. GCS verbal subscore is 5. GCS motor subscore is 6. Skin: Skin is warm and dry. He is not diaphoretic. Psychiatric: He has a normal mood and affect. His behavior is normal.   Nursing note and vitals reviewed.        MDM  Number of Diagnoses or Management Options  Diagnosis management comments:    * routine laboratory data and UA   * IVF, zofran, and analgesia    * CT abd/pelvis            Amount and/or Complexity of Data Reviewed  Clinical lab tests: ordered and reviewed  Tests in the radiology section of CPT®: ordered and reviewed  Discussion of test results with the performing providers: yes  Review and summarize past medical records: yes  Discuss the patient with other providers: yes    Risk of Complications, Morbidity, and/or Mortality  General comments:    - stable, ambulatory pt in NAD    Patient Progress  Patient progress: stable    ED Course       Procedures        5:26 PM  Pt has been reevaluated. There are no new complaints, changes, or physical findings at this time. Medications have been reviewed w/ pt and/or family. Pt and/or family's questions have been answered. Pt and/or family expressed good understanding of the dx/tx/rx and is in agreement with plan of care. Pt instructed and agreed to f/u w/ PCP and GI and to return to ED upon further deterioration. Pt is ready for discharge. LABORATORY TESTS:  Recent Results (from the past 12 hour(s))   METABOLIC PANEL, COMPREHENSIVE    Collection Time: 05/20/17  2:36 PM   Result Value Ref Range    Sodium 140 136 - 145 mmol/L    Potassium 3.4 (L) 3.5 - 5.1 mmol/L    Chloride 102 97 - 108 mmol/L    CO2 29 21 - 32 mmol/L    Anion gap 9 5 - 15 mmol/L    Glucose 79 65 - 100 mg/dL    BUN 8 6 - 20 MG/DL    Creatinine 1.01 0.70 - 1.30 MG/DL    BUN/Creatinine ratio 8 (L) 12 - 20      GFR est AA >60 >60 ml/min/1.73m2    GFR est non-AA >60 >60 ml/min/1.73m2    Calcium 8.9 8.5 - 10.1 MG/DL    Bilirubin, total 1.2 (H) 0.2 - 1.0 MG/DL    ALT (SGPT) 32 12 - 78 U/L    AST (SGOT) 25 15 - 37 U/L    Alk. phosphatase 61 45 - 117 U/L    Protein, total 7.7 6.4 - 8.2 g/dL    Albumin 4.2 3.5 - 5.0 g/dL    Globulin 3.5 2.0 - 4.0 g/dL    A-G Ratio 1.2 1.1 - 2.2     CBC WITH AUTOMATED DIFF    Collection Time: 05/20/17  2:36 PM   Result Value Ref Range    WBC 5.5 4.1 - 11.1 K/uL    RBC 4.11 4.10 - 5.70 M/uL    HGB 13.8 12.1 - 17.0 g/dL    HCT 38.9 36.6 - 50.3 %    MCV 94.6 80.0 - 99.0 FL    MCH 33.6 26.0 - 34.0 PG    MCHC 35.5 30.0 - 36.5 g/dL    RDW 12.4 11.5 - 14.5 %    PLATELET 849 301 - 003 K/uL    NEUTROPHILS 42 32 - 75 %    LYMPHOCYTES 45 12 - 49 %    MONOCYTES 9 5 - 13 %    EOSINOPHILS 3 0 - 7 %    BASOPHILS 1 0 - 1 %    ABS.  NEUTROPHILS 2.3 1.8 - 8.0 K/UL ABS. LYMPHOCYTES 2.5 0.8 - 3.5 K/UL    ABS. MONOCYTES 0.5 0.0 - 1.0 K/UL    ABS. EOSINOPHILS 0.1 0.0 - 0.4 K/UL    ABS. BASOPHILS 0.0 0.0 - 0.1 K/UL   LIPASE    Collection Time: 05/20/17  2:36 PM   Result Value Ref Range    Lipase 98 73 - 393 U/L   PROTHROMBIN TIME + INR    Collection Time: 05/20/17  2:36 PM   Result Value Ref Range    INR 1.1 0.9 - 1.1      Prothrombin time 11.4 (H) 9.0 - 11.1 sec   PTT    Collection Time: 05/20/17  2:36 PM   Result Value Ref Range    aPTT 32.3 22.1 - 32.5 sec    aPTT, therapeutic range     58.0 - 77.0 SECS   URINALYSIS W/MICROSCOPIC    Collection Time: 05/20/17  2:37 PM   Result Value Ref Range    Color YELLOW/STRAW      Appearance CLEAR CLEAR      Specific gravity 1.009 1.003 - 1.030      pH (UA) 6.5 5.0 - 8.0      Protein NEGATIVE  NEG mg/dL    Glucose NEGATIVE  NEG mg/dL    Ketone NEGATIVE  NEG mg/dL    Bilirubin NEGATIVE  NEG      Blood NEGATIVE  NEG      Urobilinogen 0.2 0.2 - 1.0 EU/dL    Nitrites NEGATIVE  NEG      Leukocyte Esterase NEGATIVE  NEG      WBC 0-4 0 - 4 /hpf    RBC 0-5 0 - 5 /hpf    Epithelial cells FEW FEW /lpf    Bacteria NEGATIVE  NEG /hpf    Hyaline cast 0-2 0 - 5 /lpf       IMAGING RESULTS:  CT ABD PELV W CONT   Final Result        Ct Abd Pelv W Cont    Result Date: 5/20/2017  Clinical history: Abdominal pain, 40 pound weight loss, cirrhosis reported history  INDICATION: abd pain COMPARISON: TECHNIQUE: Following the uneventful intravenous administration of 100 cc Isovue-370, thin axial images were obtained through the abdomen and pelvis. Coronal and sagittal reconstructions were generated. Oral contrast was not administered. CT dose reduction was achieved through use of a standardized protocol tailored for this examination and automatic exposure control for dose modulation. FINDINGS: CRITICAL FINDINGS: N/A INCIDENTALS: There is no obstruction or free air. There is no hepatic morphologic evidence of cirrhosis or ascites. There is no pancreatic mass. LIVER: No mass or biliary dilatation. There is no intrahepatic duct dilatation. The CBD is not dilated. There is no hepatic parenchymal mass. Hepatic enhancement pattern is within normal limits. SPLEEN/PANCREAS: No mass. There is no pancreatic duct dilatation. There is no evidence of splenomegaly. ADRENALS/KIDNEYS: There is no adrenal mass. There is no hydroureteronephrosis. There is no renal or ureteral calculus. There is no renal mass. There is no perinephric mass. COLON AND SMALL BOWEL: No dilatation or wall thickening. There is no obstruction or free intraperitoneal air. There is no evidence of incarceration or obstruction. APPENDIX: Unremarkable. URINARY BLADDER: No mass or calculus. BONES/RETROPERITONEUM: No destructive bone lesion. , abdominal aorta normal in caliber. No aneurysm. No fracture. No retroperitoneal adenopathy. IMPRESSION: No acute intraperitoneal process. MEDICATIONS GIVEN:  Medications   sodium chloride 0.9 % bolus infusion 1,000 mL (0 mL IntraVENous IV Completed 5/20/17 1641)   ondansetron (ZOFRAN) injection 4 mg (4 mg IntraVENous Given 5/20/17 1443)   morphine injection 4 mg (4 mg IntraVENous Given 5/20/17 1443)   iopamidol (ISOVUE-370) 76 % injection 100 mL (100 mL IntraVENous Given 5/20/17 1530)       IMPRESSION:  1. Chronic abdominal pain        PLAN:  1. Discharge Medication List as of 5/20/2017  4:30 PM      START taking these medications    Details   promethazine (PHENERGAN) 25 mg tablet Take 1 Tab by mouth every six (6) hours as needed. , Print, Disp-12 Tab, R-0      ibuprofen (MOTRIN) 800 mg tablet Take 1 Tab by mouth every six (6) hours as needed for Pain for up to 7 days. , Print, Disp-20 Tab, R-0         CONTINUE these medications which have NOT CHANGED    Details   furosemide (LASIX) 20 mg tablet Take 1 Tab by mouth daily. , Normal, Disp-90 Tab, R-0      sertraline (ZOLOFT) 50 mg tablet Take 50 mg by mouth daily. , Historical Med      spironolactone (ALDACTONE) 100 mg tablet Take 100 mg by mouth daily. , Historical Med      Comp. Stocking,Thigh,Long,Large misc 1 Package by Does Not Apply route daily. , Print, Disp-1 Package, R-0           2. Follow-up Information     Follow up With Details Comments [address], MD In 1 week  [address]  [phone]      Amber Cannon MD In 2 days  [address]  [phone]      [facility] EMERGENCY DEPT  As needed, If symptoms worsen [address]  [phone]        3.  Supportive care     Return to ED if worse       Luzmaria Leslie NP  5:26 PM

## 2017-05-20 NOTE — PROGRESS NOTES
[facility]: MED RECONCILIATION    Comments/Recommendations:     Medications added:     · Spironolactone 100 mg PO every day - this has been prescribed but patient has not yet picked up from pharmacy    Medications removed:    · none    Medications adjusted:    · none    Information obtained from: patient, Rx query    Significant PMH/Disease States:   Past Medical History:   Diagnosis Date    Anxiety     B[facility] disorder ([facility]     Cirrhosis, alcoholic ([facility]     Depression      Chief Complaint for this Admission:   Chief Complaint   Patient presents with    Abdominal Pain    Nausea    Chills     Allergies: Peanut and Tree nut    Prior to Admission Medications:     Medication Documentation Review Audit       Reviewed by LAWANDA MarquezD (Pharmacist) on 05/20/17 at 1512         Medication Sig Documenting Provider Last Dose Status Taking? Comp. Stocking,Thigh,Long,Large misc 1 Package by Does Not Apply route daily. Macy Zamora MD  Active No    furosemide (LASIX) 20 mg tablet Take 1 Tab by mouth daily. Izzy Bashir MD 5/20/2017 am Active Yes    sertraline (ZOLOFT) 50 mg tablet Take 50 mg by mouth daily. Chato Rojas MD 5/20/2017 am Active Yes    spironolactone (ALDACTONE) 100 mg tablet Take 100 mg by mouth daily. Historical Provider Not Taking Unknown time Active No             Med Note Francisco Javier Mcfarland   Sat May 20, 2017  3:12 PM): Prescribed on 5/10/17, but patient has not picked up from pharmacy yet                      Thank you for the consult,  Milton LEÓN [facility]

## 2017-05-20 NOTE — DISCHARGE INSTRUCTIONS
Abdominal Pain: Care Instructions  Your Care Instructions    Abdominal pain has many possible causes. Some aren't serious and get better on their own in a few days. Others need more testing and treatment. If your pain continues or gets worse, you need to be rechecked and may need more tests to find out what is wrong. You may need surgery to correct the problem. Don't ignore new symptoms, such as fever, nausea and vomiting, urination problems, pain that gets worse, and dizziness. These may be signs of a more serious problem. Your doctor may have recommended a follow-up visit in the next 8 to 12 hours. If you are not getting better, you may need more tests or treatment. The doctor has checked you carefully, but problems can develop later. If you notice any problems or new symptoms, get medical treatment right away. Follow-up care is a key part of your treatment and safety. Be sure to make and go to all appointments, and call your doctor if you are having problems. It's also a good idea to know your test results and keep a list of the medicines you take. How can you care for yourself at home? · Rest until you feel better. · To prevent dehydration, drink plenty of fluids, enough so that your urine is light yellow or clear like water. Choose water and other caffeine-free clear liquids until you feel better. If you have kidney, heart, or liver disease and have to limit fluids, talk with your doctor before you increase the amount of fluids you drink. · If your stomach is upset, eat mild foods, such as rice, dry toast or crackers, bananas, and applesauce. Try eating several small meals instead of two or three large ones. · Wait until 48 hours after all symptoms have gone away before you have spicy foods, alcohol, and drinks that contain caffeine. · Do not eat foods that are high in fat. · Avoid anti-inflammatory medicines such as aspirin, ibuprofen (Advil, Motrin), and naproxen (Aleve).  These can cause stomach upset. Talk to your doctor if you take daily aspirin for another health problem. When should you call for help? Call 911 anytime you think you may need emergency care. For example, call if:  · You passed out (lost consciousness). · You pass maroon or very bloody stools. · You vomit blood or what looks like coffee grounds. · You have new, severe belly pain. Call your doctor now or seek immediate medical care if:  · Your pain gets worse, especially if it becomes focused in one area of your belly. · You have a new or higher fever. · Your stools are black and look like tar, or they have streaks of blood. · You have unexpected vaginal bleeding. · You have symptoms of a urinary tract infection. These may include:  ¨ Pain when you urinate. ¨ Urinating more often than usual.  ¨ Blood in your urine. · You are dizzy or lightheaded, or you feel like you may faint. Watch closely for changes in your health, and be sure to contact your doctor if:  · You are not getting better after 1 day (24 hours). Where can you learn more? Go to http://cara-latonya.info/. Enter J410 in the search box to learn more about \"Abdominal Pain: Care Instructions. \"  Current as of: May 27, 2016  Content Version: 11.2  © 0363-2917 [employer]. Care instructions adapted under license by [employer] (which disclaims liability or warranty for this information). If you have questions about a medical condition or this instruction, always ask your healthcare professional. [place] [address] any warranty or liability for your use of this information. We hope that we have addressed all of your medical concerns. The examination and treatment you received in the Emergency Department were for an emergent problem and were not intended as complete care. It is important that you follow up with your healthcare provider(s) for ongoing care.  If your symptoms worsen or do not improve as expected, and you are unable to reach your usual health care provider(s), you should return to the Emergency Department. Today's healthcare is undergoing tremendous change, and patient satisfaction surveys are one of the many tools to assess the quality of medical care. You may receive a survey from the [employer] regarding your experience in the Emergency Department. I hope that your experience has been completely positive, particularly the medical care that I provided. As such, please participate in the survey; anything less than excellent does not meet my expectations or intentions. [facility] and [facility] participate in nationally recognized quality of care measures. If your blood pressure is greater than 120/80, as reported below, we urge that you seek medical care to address the potential of high blood pressure, commonly known as hypertension. Hypertension can be hereditary or can be caused by certain medical conditions, pain, stress, or \"white coat syndrome. \"       Please make an appointment with your health care provider(s) for follow up of your Emergency Department visit. VITALS:   Patient Vitals for the past 8 hrs:   Temp Pulse Resp BP SpO2   05/20/17 1515 - - - 129/65 99 %   05/20/17 1500 - - - - 100 %   05/20/17 1415 - - - 132/69 100 %   05/20/17 1411 97.7 °F (36.5 °C) (!) 54 17 132/69 99 %          Thank you for allowing us to provide you with medical care today. We realize that you have many choices for your emergency care needs. Please choose us in the future for any continued health care needs. Jose Carlos Patel, [address] [place] [address] 20.   Office: [phone]            Recent Results (from the past 24 hour(s))   METABOLIC PANEL, COMPREHENSIVE    Collection Time: 05/20/17  2:36 PM   Result Value Ref Range    Sodium 140 136 - 145 mmol/L    Potassium 3.4 (L) 3.5 - 5.1 mmol/L    Chloride 102 97 - 108 mmol/L    CO2 29 21 - 32 mmol/L    Anion gap 9 5 - 15 mmol/L    Glucose 79 65 - 100 mg/dL    BUN 8 6 - 20 MG/DL    Creatinine 1.01 0.70 - 1.30 MG/DL    BUN/Creatinine ratio 8 (L) 12 - 20      GFR est AA >60 >60 ml/min/1.73m2    GFR est non-AA >60 >60 ml/min/1.73m2    Calcium 8.9 8.5 - 10.1 MG/DL    Bilirubin, total 1.2 (H) 0.2 - 1.0 MG/DL    ALT (SGPT) 32 12 - 78 U/L    AST (SGOT) 25 15 - 37 U/L    Alk. phosphatase 61 45 - 117 U/L    Protein, total 7.7 6.4 - 8.2 g/dL    Albumin 4.2 3.5 - 5.0 g/dL    Globulin 3.5 2.0 - 4.0 g/dL    A-G Ratio 1.2 1.1 - 2.2     CBC WITH AUTOMATED DIFF    Collection Time: 05/20/17  2:36 PM   Result Value Ref Range    WBC 5.5 4.1 - 11.1 K/uL    RBC 4.11 4.10 - 5.70 M/uL    HGB 13.8 12.1 - 17.0 g/dL    HCT 38.9 36.6 - 50.3 %    MCV 94.6 80.0 - 99.0 FL    MCH 33.6 26.0 - 34.0 PG    MCHC 35.5 30.0 - 36.5 g/dL    RDW 12.4 11.5 - 14.5 %    PLATELET 609 728 - 510 K/uL    NEUTROPHILS 42 32 - 75 %    LYMPHOCYTES 45 12 - 49 %    MONOCYTES 9 5 - 13 %    EOSINOPHILS 3 0 - 7 %    BASOPHILS 1 0 - 1 %    ABS. NEUTROPHILS 2.3 1.8 - 8.0 K/UL    ABS. LYMPHOCYTES 2.5 0.8 - 3.5 K/UL    ABS. MONOCYTES 0.5 0.0 - 1.0 K/UL    ABS. EOSINOPHILS 0.1 0.0 - 0.4 K/UL    ABS.  BASOPHILS 0.0 0.0 - 0.1 K/UL   LIPASE    Collection Time: 05/20/17  2:36 PM   Result Value Ref Range    Lipase 98 73 - 393 U/L   PROTHROMBIN TIME + INR    Collection Time: 05/20/17  2:36 PM   Result Value Ref Range    INR 1.1 0.9 - 1.1      Prothrombin time 11.4 (H) 9.0 - 11.1 sec   PTT    Collection Time: 05/20/17  2:36 PM   Result Value Ref Range    aPTT 32.3 22.1 - 32.5 sec    aPTT, therapeutic range     58.0 - 77.0 SECS   URINALYSIS W/MICROSCOPIC    Collection Time: 05/20/17  2:37 PM   Result Value Ref Range    Color YELLOW/STRAW      Appearance CLEAR CLEAR      Specific gravity 1.009 1.003 - 1.030      pH (UA) 6.5 5.0 - 8.0      Protein NEGATIVE  NEG mg/dL    Glucose NEGATIVE  NEG mg/dL    Ketone NEGATIVE NEG mg/dL    Bilirubin NEGATIVE  NEG      Blood NEGATIVE  NEG      Urobilinogen 0.2 0.2 - 1.0 EU/dL    Nitrites NEGATIVE  NEG      Leukocyte Esterase NEGATIVE  NEG      WBC 0-4 0 - 4 /hpf    RBC 0-5 0 - 5 /hpf    Epithelial cells FEW FEW /lpf    Bacteria NEGATIVE  NEG /hpf    Hyaline cast 0-2 0 - 5 /lpf       Ct Abd Pelv W Cont    Result Date: 5/20/2017  Clinical history: Abdominal pain, 40 pound weight loss, cirrhosis reported history  INDICATION: abd pain COMPARISON: TECHNIQUE: Following the uneventful intravenous administration of 100 cc Isovue-370, thin axial images were obtained through the abdomen and pelvis. Coronal and sagittal reconstructions were generated. Oral contrast was not administered. CT dose reduction was achieved through use of a standardized protocol tailored for this examination and automatic exposure control for dose modulation. FINDINGS: CRITICAL FINDINGS: N/A INCIDENTALS: There is no obstruction or free air. There is no hepatic morphologic evidence of cirrhosis or ascites. There is no pancreatic mass. LIVER: No mass or biliary dilatation. There is no intrahepatic duct dilatation. The CBD is not dilated. There is no hepatic parenchymal mass. Hepatic enhancement pattern is within normal limits. SPLEEN/PANCREAS: No mass. There is no pancreatic duct dilatation. There is no evidence of splenomegaly. ADRENALS/KIDNEYS: There is no adrenal mass. There is no hydroureteronephrosis. There is no renal or ureteral calculus. There is no renal mass. There is no perinephric mass. COLON AND SMALL BOWEL: No dilatation or wall thickening. There is no obstruction or free intraperitoneal air. There is no evidence of incarceration or obstruction. APPENDIX: Unremarkable. URINARY BLADDER: No mass or calculus. BONES/RETROPERITONEUM: No destructive bone lesion. , abdominal aorta normal in caliber. No aneurysm. No fracture. No retroperitoneal adenopathy. IMPRESSION: No acute intraperitoneal process.

## 2017-05-20 NOTE — ED NOTES
Family member in room unhappy that cab is not being provided home. Calling for a ride, unhappy that they will have to wait in waiting area due to current need for room after d/c. On phone with another family member stating \"they aren't keeping him even though they should be. \" Lul Marks in room for discharge.

## 2017-05-20 NOTE — ED TRIAGE NOTES
Abdominal pain started this morning. PMH liver cirrhosis. Has lost 40 lb since April. Nausea and dry today. Dr. Cesia Hansen is managing GI.

## 2017-05-25 ENCOUNTER — OFFICE VISIT (OUTPATIENT)
Dept: FAMILY MEDICINE CLINIC | Age: 32
End: 2017-05-25

## 2017-05-25 VITALS
WEIGHT: 185 LBS | TEMPERATURE: 97 F | OXYGEN SATURATION: 98 % | HEART RATE: 89 BPM | SYSTOLIC BLOOD PRESSURE: 111 MMHG | HEIGHT: 78 IN | DIASTOLIC BLOOD PRESSURE: 74 MMHG | RESPIRATION RATE: 16 BRPM | BODY MASS INDEX: 21.4 KG/M2

## 2017-05-25 DIAGNOSIS — E07.9 THYROID DISEASE: Primary | ICD-10-CM

## 2017-05-25 RX ORDER — OLANZAPINE 20 MG/1
TABLET ORAL
Refills: 2 | COMMUNITY
Start: 2017-05-12 | End: 2022-06-24

## 2017-05-25 RX ORDER — ONDANSETRON 4 MG/1
TABLET, ORALLY DISINTEGRATING ORAL
Refills: 0 | COMMUNITY
Start: 2017-05-20 | End: 2017-07-19 | Stop reason: SDUPTHER

## 2017-05-25 RX ORDER — OXYCODONE HYDROCHLORIDE 5 MG/1
TABLET ORAL
Refills: 0 | COMMUNITY
Start: 2017-05-20 | End: 2019-05-17

## 2017-05-25 NOTE — PROGRESS NOTES
JUAN Aquino is a 29 y.o. male who presents to establish care and check his thyroid. He recently has been seen in the ED for abdominal pain, seeing cardiology and GI (Dr. Renee Campa). Cardiology for leg swelling which was thought to be 2/2 liver cirrhosis. Had recent Hep labs per patients with Dr. Renee Campa but those records aren't in our system. He reports tomorrow he gets an U/S to eval liver for cirrhosis. Notes he used to drink \"a LOT\" since age of 17 but quit in April 2/2 these GI issues. Also reports that labs with Dr. Renee Campa revealed a thyroid \"issue\" that he was to f/u with PCP about but doesn't know what the issue was exactly and we don't have Dr. Demetris King records. Today, has heat intolerance, anxiety, weight loss. Denies hair changes. Denies family hx of thyroid issues. Also smokes 1/2 PPD since \"age 14\". PMHx:  Past Medical History:   Diagnosis Date    Anxiety     Bipolar 1 disorder ([facility] [address]     Cirrhosis, alcoholic (HCC)     Depression      Meds:   Current Outpatient Prescriptions   Medication Sig Dispense Refill    OLANZapine (ZYPREXA) 20 mg tablet TAKE 1 TABLET BY MOUTH AT BEDTIME  2    ondansetron (ZOFRAN ODT) 4 mg disintegrating tablet DISSOLVE 1 TABLET ON TONGUE EVERY 6 HOURS AS NEEDED FOR NAUSEA  0    spironolactone (ALDACTONE) 100 mg tablet Take 100 mg by mouth daily.  promethazine (PHENERGAN) 25 mg tablet Take 1 Tab by mouth every six (6) hours as needed. 12 Tab 0    ibuprofen (MOTRIN) 800 mg tablet Take 1 Tab by mouth every six (6) hours as needed for Pain for up to 7 days. 20 Tab 0    oxyCODONE (OXYIR) 5 mg capsule Take 1 Cap by mouth every four (4) hours as needed. Max Daily Amount: 30 mg. 10 Cap 0    furosemide (LASIX) 20 mg tablet Take 1 Tab by mouth daily. 90 Tab 0    sertraline (ZOLOFT) 50 mg tablet Take 50 mg by mouth daily.  Comp. Stocking,Thigh,Long,Large misc 1 Package by Does Not Apply route daily.  1 Package 0    oxyCODONE IR (ROXICODONE) 5 mg immediate release tablet TAKE 1 TABLET BY MOUTH EVERY 4 HOURS AS NEEDED. MAX OF 30MG PER DAY , MAX 6 TABS PER DAY  0     Allergies: Allergies   Allergen Reactions    Peanut Swelling    Tree Nut Swelling       Smoker:  History   Smoking Status    Current Every Day Smoker   Smokeless Tobacco    Not on file     ETOH:   History   Alcohol Use No     Comment: socially/ April 2017 last drink     FH:   Family History   Problem Relation Age of Onset    Diabetes Mother     Asthma Mother     Hypertension Mother     Cancer Father      ROS:  Review of Systems   Constitutional: Negative for chills and fever. HENT: Negative for congestion. Eyes: Negative for photophobia and visual disturbance. Respiratory: Negative for cough. Gastrointestinal: Positive for abdominal pain (>1 month, sees Dr. Jamel Petit for this). Negative for diarrhea, nausea and vomiting. Endocrine: Positive for heat intolerance. Negative for cold intolerance and polyuria. Skin: Negative for pallor, rash and wound. Neurological: Negative for dizziness and headaches. Physical Exam:  Visit Vitals    /74    Pulse 89    Temp 97 °F (36.1 °C) (Oral)    Resp 16    Ht 6' 7\" (2.007 m)    Wt 185 lb (83.9 kg)    SpO2 98%    BMI 20.84 kg/m2       Wt Readings from Last 3 Encounters:   05/25/17 185 lb (83.9 kg)   05/20/17 187 lb 2.7 oz (84.9 kg)   05/15/17 194 lb 12.8 oz (88.4 kg)     BP Readings from Last 3 Encounters:   05/25/17 111/74   05/20/17 116/68   05/15/17 122/84      Physical Exam   Constitutional: He is oriented to person, place, and time. He appears well-developed and well-nourished. HENT:   Head: Normocephalic. Nose: Nose normal.   Eyes: Conjunctivae are normal.   no lid lag     Neck: No tracheal deviation present. No thyromegaly present. Cardiovascular: Normal rate, regular rhythm and normal heart sounds. Exam reveals no gallop and no friction rub. No murmur heard.   Pulmonary/Chest: Effort normal and breath sounds normal. No respiratory distress. He has no wheezes. He has no rales. Musculoskeletal: He exhibits no edema. no tremors   Neurological: He is alert and oriented to person, place, and time. Coordination normal.   Skin: Skin is warm and dry. He is not diaphoretic.   hair normal, not thin or fragile   Psychiatric: He has a normal mood and affect. His behavior is normal.   Vitals reviewed. Assessment     29 y.o. male with:    ICD-10-CM ICD-9-CM    1. Thyroid disease E07.9 246.9 T3 TOTAL      TSH 3RD GENERATION      T4, FREE              Plan       Orders Placed This Encounter    T3 TOTAL    TSH 3RD GENERATION    T4, FREE     Unsp thyroid issue: Pt doesn't have labs, wants us to draw labs here. Will check thyroid labs, other labs have been done recently in the ED, no need to repeat. Will call with results once I have them. Asha Del Castillo would like his lab results by phone: [phone]. I have discussed the diagnosis with the patient and the intended plan as seen in the above orders. The patient has received an after-visit summary and questions were answered concerning future plans. I have discussed medication side effects and warnings with the patient as well.     Consuelo Vogt MD  Family Medicine Resident

## 2017-05-25 NOTE — MR AVS SNAPSHOT
Visit Information Date & Time Provider Department Dept. Phone Encounter #  
 5/25/2017  1:00 PM Vernia Ormond, MD [address] [place] [address] [phone] [id number] Follow-up Instructions Return in about 4 weeks (around 6/22/2017) for Follow Up. Your Appointments 11/21/2017 10:00 AM  
ESTABLISHED PATIENT with Scott Trinh MD  
[facility] ([facility]) Appt Note: 6 mo fup  
 [address] [facility] [address] [facility]  
[facility] [address] [address] Upcoming Health Maintenance Date Due Pneumococcal 19-64 Medium Risk (1 of 1 - PPSV23) 4/12/2006 DTaP/Tdap/Td series (1 - Tdap) 4/12/2008 INFLUENZA AGE 9 TO ADULT 8/1/2017 Allergies as of 5/25/2017  Review Complete On: 5/25/2017 By: Vic Arellano LPN Severity Noted Reaction Type Reactions Peanut  09/09/2016    Swelling Tree Nut  09/09/2016    Swelling Current Immunizations  Never Reviewed No immunizations on file. Not reviewed this visit You Were Diagnosed With   
  
 Codes Comments Thyroid disease    -  Primary ICD-10-CM: E07.9 ICD-9-CM: 246. 9 Vitals BP Pulse Temp Resp Height(growth percentile) Weight(growth percentile) 111/74 89 97 °F (36.1 °C) (Oral) 16 6' 7\" (2.007 m) 185 lb (83.9 kg) SpO2 BMI Smoking Status 98% 20.84 kg/m2 Current Every Day Smoker Vitals History BMI and BSA Data Body Mass Index Body Surface Area  
 20.84 kg/m 2 2.16 m 2 Preferred Pharmacy [facility]acy Name Phone [facility] [address] [phone] Your Updated Medication List  
  
   
This list is accurate as of: 5/25/17  1:24 PM.  Always use your most recent med list.  
  
  
  
  
 Comp. Stocking,Thigh,Long,Large Misc  
1 Package by Does Not Apply route daily. furosemide 20 mg tablet Commonly known as:  LASIX Take 1 Tab by mouth daily. ibuprofen 800 mg tablet Commonly known as:  MOTRIN Take 1 Tab by mouth every six (6) hours as needed for Pain for up to 7 days. OLANZapine 20 mg tablet Commonly known as:  ZyPREXA  
TAKE 1 TABLET BY MOUTH AT BEDTIME  
  
 ondansetron 4 mg disintegrating tablet Commonly known as:  ZOFRAN ODT  
DISSOLVE 1 TABLET ON TONGUE EVERY 6 HOURS AS NEEDED FOR NAUSEA  
  
 * oxyCODONE 5 mg capsule Commonly known as:  OXYIR Take 1 Cap by mouth every four (4) hours as needed. Max Daily Amount: 30 mg.  
  
 * oxyCODONE IR 5 mg immediate release tablet Commonly known as:  ROXICODONE  
TAKE 1 TABLET BY MOUTH EVERY 4 HOURS AS NEEDED. MAX OF 30MG PER DAY , MAX 6 TABS PER DAY  
  
 promethazine 25 mg tablet Commonly known as:  PHENERGAN Take 1 Tab by mouth every six (6) hours as needed. spironolactone 100 mg tablet Commonly known as:  ALDACTONE Take 100 mg by mouth daily. ZOLOFT 50 mg tablet Generic drug:  sertraline Take 50 mg by mouth daily. * Notice: This list has 2 medication(s) that are the same as other medications prescribed for you. Read the directions carefully, and ask your doctor or other care provider to review them with you. We Performed the Following   
 T3 TOTAL [id number] CPT(R)] T4, FREE [id number] CPT(R)] TSH 3RD GENERATION [26177 CPT(R)] Follow-up Instructions Return in about 4 weeks (around 6/22/2017) for Follow Up. Patient Instructions Hyperthyroidism: Care Instructions Your Care Instructions Hyperthyroidism occurs when the thyroid gland makes too much thyroid hormone. This speeds up your metabolismhow your body uses energy. This condition can cause you to be very active, lose weight, and have sleep problems, eye problems, and a fast heart rate. It can also cause a goiter. A goiter is an enlarged thyroid gland that you can see at the front of the neck. Hyperthyroidism is often caused by Graves disease. In Graves' disease, the body's defense (immune) system attacks the thyroid gland. Your doctor may prescribe a beta-blocker medicine to slow your pulse and calm you down. But this is not a treatment for hyperthyroidism. It is given for your fast heart rate. Your doctor may also give you antithyroid medicine. This medicine keeps excess thyroid hormone in check. In some cases, doctors recommend radioactive iodine or surgery to remove the thyroid. After either of these treatments, you may need to take medicine to replace thyroid hormone for the rest of your life. Follow-up care is a key part of your treatment and safety. Be sure to make and go to all appointments, and call your doctor if you are having problems. Its also a good idea to know your test results and keep a list of the medicines you take. How can you care for yourself at home? · Take your medicines exactly as prescribed. You need to take the thyroid medicine at the same time each day. Call your doctor if you think you are having a problem with your medicine. · Graves' disease can make your eyes sore. Use artificial tears, eye drops, and sunglasses to protect your eyes from dryness, wind, and sun. Raise your head with pillows at night to prevent your eyes from swelling. In some cases, taping your eyelids shut at night will keep your eyes from being dry in the morning. · Make sure you get enough calcium. Foods that are rich in calcium include milk, yogurt, cheese, and dark green vegetables. · If you need to gain weight, ask your doctor about special diets. · Do not eat kelp. [place] is high in iodine, which can make hyperthyroidism worse. [place] is commonly used in [employer] and other [place] foods. You can use iodized salt and eat bread and seafood. Try to eat a balanced diet. · Do not use caffeine and other stimulants. These can make symptoms worse, such as a fast heartbeat, nervousness, and problems focusing. · Do not smoke. Smoking can make your condition worse and may lead to more serious eye problems. If you need help quitting, talk to your doctor about stop-smoking programs and medicines. These can increase your chances of quitting for good. · Lower your stress. Learn to use biofeedback, guided imagery, meditation, or other methods to relax. · Use creams or ointments for irritated skin. Ask your doctor which type to use. · Tell all your doctors about your condition. They need to know because some medicines contain iodine. When should you call for help? Call your doctor now or seek immediate medical care if: 
· You have symptoms of a sudden, very high thyroid level (thyroid storm). These include: ¨ Being nauseated, vomiting, and having diarrhea. ¨ Sweating a lot. ¨ Feeling extremely restless and confused. ¨ Having a high fever. ¨ Having a fast heartbeat. · You have sudden vision changes or eye pain. · You have a fever or severe sore throat and are taking antithyroid medicines, such as PTU or methimazole. Watch closely for changes in your health, and be sure to contact your doctor if: 
· You have a sore throat or have problems swallowing. · You have swollen, itchy, or red eyes or your other eye symptoms get worse, or you have new vision problems. · You have signs of a low thyroid level (hypothyroidism). You may feel very tired, confused, or weak. Where can you learn more? Go to http://cara-latonya.info/. Enter V737 in the search box to learn more about \"Hyperthyroidism: Care Instructions. \" Current as of: July 28, 2016 Content Version: 11.2 © 7870-2588 [employer].  Care instructions adapted under license by [employer] (which disclaims liability or warranty for this information). If you have questions about a medical condition or this instruction, always ask your healthcare professional. [address] any warranty or liability for your use of this information. Introducing [facility]! Shira Evans introduces [employer] patient portal. Now you can access parts of your medical record, email your doctor's office, and request medication refills online. 1. In your internet browser, go to [email or web address]. [email or web address] 2. Click on the First Time User? Click Here link in the Sign In box. You will see the New Member Sign Up page. 3. Enter your Smallknot Access Code exactly as it appears below. You will not need to use this code after youve completed the sign-up process. If you do not sign up before the expiration date, you must request a new code. · Smallknot Access Code: [email or web address] Expires: 7/4/2017 11:20 AM 
 
4. Enter the last four digits of your Social Security Number (xxxx) and Date of Birth (mm/dd/yyyy) as indicated and click Submit. You will be taken to the next sign-up page. 5. Create a Smallknot ID. This will be your Smallknot login ID and cannot be changed, so think of one that is secure and easy to remember. 6. Create a [employer]knot password. You can change your password at any time. 7. Enter your Password Reset Question and Answer. This can be used at a later time if you forget your password. 8. Enter your e-mail address. You will receive e-mail notification when new information is available in [address] 9. Click Sign Up. You can now view and download portions of your medical record. 10. Click the Download Summary menu link to download a portable copy of your medical information. If you have questions, please visit the Frequently Asked Questions section of the [employer]knot website. Remember, [employer]not is NOT to be used for urgent needs. For medical emergencies, dial 911. Now available from your iPhone and Android! Please provide this summary of care documentation to your next provider. Your primary care clinician is listed as Austin Francois. If you have any questions after today's visit, please call [phone].

## 2017-05-25 NOTE — PROGRESS NOTES
Chief Complaint   Patient presents with   [facility] Follow Up     chirrosis of liver

## 2017-05-25 NOTE — PROGRESS NOTES
I reviewed the patient's medical history, the resident's findings on physical examination, the patient's diagnoses, and treatment plan as documented in the resident note. I concur with the treatment plan as documented with additional suggestion should start with TSH with reflex to T4.

## 2017-05-25 NOTE — PATIENT INSTRUCTIONS
Hyperthyroidism: Care Instructions  Your Care Instructions  Hyperthyroidism occurs when the thyroid gland makes too much thyroid hormone. This speeds up your metabolism--how your body uses energy. This condition can cause you to be very active, lose weight, and have sleep problems, eye problems, and a fast heart rate. It can also cause a goiter. A goiter is an enlarged thyroid gland that you can see at the front of the neck. Hyperthyroidism is often caused by Graves disease. In Graves' disease, the body's defense (immune) system attacks the thyroid gland. Your doctor may prescribe a beta-blocker medicine to slow your pulse and calm you down. But this is not a treatment for hyperthyroidism. It is given for your fast heart rate. Your doctor may also give you antithyroid medicine. This medicine keeps excess thyroid hormone in check. In some cases, doctors recommend radioactive iodine or surgery to remove the thyroid. After either of these treatments, you may need to take medicine to replace thyroid hormone for the rest of your life. Follow-up care is a key part of your treatment and safety. Be sure to make and go to all appointments, and call your doctor if you are having problems. Its also a good idea to know your test results and keep a list of the medicines you take. How can you care for yourself at home? · Take your medicines exactly as prescribed. You need to take the thyroid medicine at the same time each day. Call your doctor if you think you are having a problem with your medicine. · Graves' disease can make your eyes sore. Use artificial tears, eye drops, and sunglasses to protect your eyes from dryness, wind, and sun. Raise your head with pillows at night to prevent your eyes from swelling. In some cases, taping your eyelids shut at night will keep your eyes from being dry in the morning. · Make sure you get enough calcium.  Foods that are rich in calcium include milk, yogurt, cheese, and dark green vegetables. · If you need to gain weight, ask your doctor about special diets. · Do not eat kelp. [place] is high in iodine, which can make hyperthyroidism worse. [place] is commonly used in [employer] and other [place] foods. You can use iodized salt and eat bread and seafood. Try to eat a balanced diet. · Do not use caffeine and other stimulants. These can make symptoms worse, such as a fast heartbeat, nervousness, and problems focusing. · Do not smoke. Smoking can make your condition worse and may lead to more serious eye problems. If you need help quitting, talk to your doctor about stop-smoking programs and medicines. These can increase your chances of quitting for good. · Lower your stress. Learn to use biofeedback, guided imagery, meditation, or other methods to relax. · Use creams or ointments for irritated skin. Ask your doctor which type to use. · Tell all your doctors about your condition. They need to know because some medicines contain iodine. When should you call for help? Call your doctor now or seek immediate medical care if:  · You have symptoms of a sudden, very high thyroid level (thyroid storm). These include:  ¨ Being nauseated, vomiting, and having diarrhea. ¨ Sweating a lot. ¨ Feeling extremely restless and confused. ¨ Having a high fever. ¨ Having a fast heartbeat. · You have sudden vision changes or eye pain. · You have a fever or severe sore throat and are taking antithyroid medicines, such as PTU or methimazole. Watch closely for changes in your health, and be sure to contact your doctor if:  · You have a sore throat or have problems swallowing. · You have swollen, itchy, or red eyes or your other eye symptoms get worse, or you have new vision problems. · You have signs of a low thyroid level (hypothyroidism). You may feel very tired, confused, or weak. Where can you learn more? Go to http://cara-latonya.info/.   Enter M472 in the search box to learn more about \"Hyperthyroidism: Care Instructions. \"  Current as of: July 28, 2016  Content Version: 11.2  © 4024-1400 [employer]. Care instructions adapted under license by [employer] (which disclaims liability or warranty for this information). If you have questions about a medical condition or this instruction, always ask your healthcare professional. [place] [address] any warranty or liability for your use of this information.

## 2017-05-26 LAB
T3 SERPL-MCNC: 99 NG/DL (ref 71–180)
T4 FREE SERPL-MCNC: 1.17 NG/DL (ref 0.82–1.77)
TSH SERPL DL<=0.005 MIU/L-ACNC: 2.83 UIU/ML (ref 0.45–4.5)

## 2017-07-13 ENCOUNTER — TELEPHONE (OUTPATIENT)
Dept: FAMILY MEDICINE CLINIC | Age: 32
End: 2017-07-13

## 2017-07-13 NOTE — TELEPHONE ENCOUNTER
----- Message from Sheryle Cristal sent at 7/13/2017  1:36 PM EDT -----  Regarding: Dr Addison Cannon  Pt would like to get something call [facility] for nausea, [facility], pt can be reach at [phone]

## 2017-07-19 ENCOUNTER — HOSPITAL ENCOUNTER (OUTPATIENT)
Dept: ULTRASOUND IMAGING | Age: 32
Discharge: HOME | End: 2017-07-19
Attending: INTERNAL MEDICINE
Payer: MEDICAID

## 2017-07-19 VITALS
OXYGEN SATURATION: 100 % | RESPIRATION RATE: 14 BRPM | HEART RATE: 60 BPM | WEIGHT: 200 LBS | HEIGHT: 78 IN | DIASTOLIC BLOOD PRESSURE: 82 MMHG | BODY MASS INDEX: 23.14 KG/M2 | SYSTOLIC BLOOD PRESSURE: 121 MMHG

## 2017-07-19 DIAGNOSIS — R79.89 ELEVATED LFTS: ICD-10-CM

## 2017-07-19 DIAGNOSIS — K76.0 FATTY LIVER: ICD-10-CM

## 2017-07-19 PROCEDURE — 88307 TISSUE EXAM BY PATHOLOGIST: CPT | Performed by: RADIOLOGY

## 2017-07-19 PROCEDURE — 47000 NEEDLE BIOPSY OF LIVER PERQ: CPT

## 2017-07-19 PROCEDURE — 77030003497 HC NDL BIOP TISS BARD -B

## 2017-07-19 PROCEDURE — 88313 SPECIAL STAINS GROUP 2: CPT | Performed by: RADIOLOGY

## 2017-07-19 PROCEDURE — 74011000250 HC RX REV CODE- 250: Performed by: RADIOLOGY

## 2017-07-19 PROCEDURE — 74011250636 HC RX REV CODE- 250/636: Performed by: RADIOLOGY

## 2017-07-19 PROCEDURE — 99153 MOD SED SAME PHYS/QHP EA: CPT

## 2017-07-19 PROCEDURE — 99152 MOD SED SAME PHYS/QHP 5/>YRS: CPT

## 2017-07-19 RX ORDER — ONDANSETRON 4 MG/1
TABLET, ORALLY DISINTEGRATING ORAL
Qty: 10 TAB | Refills: 0 | Status: SHIPPED | OUTPATIENT
Start: 2017-07-19 | End: 2019-05-15 | Stop reason: SDUPTHER

## 2017-07-19 RX ORDER — MIDAZOLAM HYDROCHLORIDE 1 MG/ML
5 INJECTION, SOLUTION INTRAMUSCULAR; INTRAVENOUS
Status: DISCONTINUED | OUTPATIENT
Start: 2017-07-19 | End: 2017-07-19 | Stop reason: ALTCHOICE

## 2017-07-19 RX ORDER — LIDOCAINE HYDROCHLORIDE 10 MG/ML
10-30 INJECTION INFILTRATION; PERINEURAL
Status: DISCONTINUED | OUTPATIENT
Start: 2017-07-19 | End: 2017-07-19 | Stop reason: ALTCHOICE

## 2017-07-19 RX ORDER — FENTANYL CITRATE 50 UG/ML
100 INJECTION, SOLUTION INTRAMUSCULAR; INTRAVENOUS
Status: DISCONTINUED | OUTPATIENT
Start: 2017-07-19 | End: 2017-07-19 | Stop reason: ALTCHOICE

## 2017-07-19 RX ADMIN — FENTANYL CITRATE 25 MCG: 50 INJECTION, SOLUTION INTRAMUSCULAR; INTRAVENOUS at 11:40

## 2017-07-19 RX ADMIN — FENTANYL CITRATE 25 MCG: 50 INJECTION, SOLUTION INTRAMUSCULAR; INTRAVENOUS at 11:33

## 2017-07-19 RX ADMIN — FENTANYL CITRATE 25 MCG: 50 INJECTION, SOLUTION INTRAMUSCULAR; INTRAVENOUS at 11:10

## 2017-07-19 RX ADMIN — FENTANYL CITRATE 25 MCG: 50 INJECTION, SOLUTION INTRAMUSCULAR; INTRAVENOUS at 11:24

## 2017-07-19 RX ADMIN — MIDAZOLAM HYDROCHLORIDE 1 MG: 1 INJECTION, SOLUTION INTRAMUSCULAR; INTRAVENOUS at 11:24

## 2017-07-19 RX ADMIN — MIDAZOLAM HYDROCHLORIDE 1 MG: 1 INJECTION, SOLUTION INTRAMUSCULAR; INTRAVENOUS at 11:33

## 2017-07-19 RX ADMIN — SODIUM BICARBONATE 2 ML: 0.2 INJECTION, SOLUTION INTRAVENOUS at 11:30

## 2017-07-19 RX ADMIN — MIDAZOLAM HYDROCHLORIDE 1 MG: 1 INJECTION, SOLUTION INTRAMUSCULAR; INTRAVENOUS at 11:10

## 2017-07-19 NOTE — IP AVS SNAPSHOT
Summary of Care Report The Summary of Care report has been created to help improve care coordination. Users with access to [employer] or [address] (Web-based application) may access additional patient information including the Discharge Summary. If you are not currently a [address] user and need more information, please call the number listed below in the [place] [address] section and ask to be connected with Medical Records. Facility Information Name Address Phone [place] [phone] Patient Information Patient Name Sex  Lianet Goff ([id number]) Male 1985 Discharge Information Admitting Provider Service Area Unit  
 (none) [facility] / [phone] Discharge Provider Discharge Date/Time Discharge Disposition Destination (none) (none) (none) (none) Patient Language Language ENGLISH [13] Non-Hospital Problems as of 2017  Never Reviewed Class Noted - Resolved Last Modified Active Problems Biliary cirrhosis ([facility] [address]  5/15/2017 - Present 5/15/2017 by Adrian Daneils MD  
  Entered by Adrian Daniels MD  
  
You are allergic to the following Allergen Reactions Peanut Swelling Tree Nut Swelling Current Discharge Medication List  
  
ASK your doctor about these medications Dose & Instructions Dispensing Information Comments Comp. Leah Davisonavan Dose:  1 Package 1 Package by Does Not Apply route daily. Quantity:  1 Package Refills:  0  
   
 furosemide 20 mg tablet Commonly known as:  LASIX Dose:  20 mg Take 1 Tab by mouth daily. Quantity:  90 Tab Refills:  0  
   
 OLANZapine 20 mg tablet Commonly known as:  ZyPREXA  
 TAKE 1 TABLET BY MOUTH AT BEDTIME Refills:  2 ondansetron 4 mg disintegrating tablet Commonly known as:  ZOFRAN ODT  
 DISSOLVE 1 TABLET ON TONGUE EVERY 6 HOURS AS NEEDED FOR NAUSEA Refills:  0  
   
 * oxyCODONE 5 mg capsule Commonly known as:  OXYIR Dose:  5 mg Take 1 Cap by mouth every four (4) hours as needed. Max Daily Amount: 30 mg.  
 Quantity:  10 Cap Refills:  0  
   
 * oxyCODONE IR 5 mg immediate release tablet Commonly known as:  ROXICODONE  
 TAKE 1 TABLET BY MOUTH EVERY 4 HOURS AS NEEDED. MAX OF 30MG PER DAY , MAX 6 TABS PER DAY Refills:  0  
   
 promethazine 25 mg tablet Commonly known as:  PHENERGAN Dose:  25 mg Take 1 Tab by mouth every six (6) hours as needed. Quantity:  12 Tab Refills:  0  
   
 spironolactone 100 mg tablet Commonly known as:  ALDACTONE Dose:  100 mg Take 100 mg by mouth daily. Refills:  0  
   
 ZOLOFT 50 mg tablet Generic drug:  sertraline Dose:  50 mg Take 50 mg by mouth daily. Refills:  0  
   
 * Notice: This list has 2 medication(s) that are the same as other medications prescribed for you. Read the directions carefully, and ask your doctor or other care provider to review them with you. Follow-up Information None Discharge Instructions Percutaneous Liver Biopsy: What to Expect at Home Your Recovery Percutaneous liver biopsy is a procedure to take a tiny sample (biopsy) of your liver tissue. Percutaneous (say \"per-yusraw-DOMI-nee-us) means \"through the skin. \" The procedure is also called aspiration biopsy or fine-needle aspiration. The tissue sample is looked at under a microscope. Your doctor can look for infection or other liver problems. You may have some pain where the biopsy needle entered your skin (the puncture site). You may also have pain in your shoulder. This is called referred pain. It is caused by pain traveling along a nerve near the biopsy site. The referred pain usually lasts less than 12 hours.  You may have a small amount of bleeding from the puncture site. You will need to take it easy at home for 1 or 2 days after the procedure. You will probably be able to return to work and most of your usual activities after that. This care sheet gives you a general idea about how long it will take for you to recover. But each person recovers at a different pace. Follow the steps below to get better as quickly as possible. How can you care for yourself at home? Activity · Rest when you feel tired. Getting enough sleep will help you recover. · Try to walk each day. Start by walking a little more than you did the day before. Bit by bit, increase the amount you walk. Walking boosts blood flow and helps prevent pneumonia and constipation. · Avoid exercises that use your belly muscles and strenuous activities, such as bicycle riding, jogging, weight lifting, or aerobic exercise, for 1 week or until your doctor says it is okay. · Ask your doctor when you can drive again. · You will probably need to take 1 or 2 days off from work. It depends on the type of work you do and how you feel. · You will probably be able to shower the same day as the test, if your doctor says it is okay. Pat the puncture site dry. Do not take a bath for at least 2 days after the test, or until your doctor tells you it is okay. Diet · You can eat your normal diet. If your stomach is upset, try bland, low-fat foods like plain rice, broiled chicken, toast, and yogurt. · Drink plenty of fluids (unless your doctor tells you not to). Medicines · Your doctor will tell you if and when you can restart your medicines. He or she will also give you instructions about taking any new medicines. · If you take blood thinners, such as warfarin (Coumadin), clopidogrel (Plavix), or aspirin, be sure to talk to your doctor. He or she will tell you if and when to start taking those medicines again.  Make sure that you understand exactly what your doctor wants you to do. · Be safe with medicines. Take pain medicines exactly as directed. ¨ If the doctor gave you a prescription medicine for pain, take it as prescribed. ¨ If you are not taking a prescription pain medicine, take an over-the-counter medicine that your doctor recommends. Read and follow all instructions on the label. ¨ Do not take aspirin, ibuprofen (Advil, Motrin), naproxen (Aleve), or other nonsteroidal anti-inflammatory drugs (NSAIDs) unless your doctor says it is okay. · If you think your pain medicine is making you sick to your stomach: 
¨ Take your medicine after meals (unless your doctor has told you not to). ¨ Ask your doctor for a different kind of pain medicine. Care of the puncture site · Keep a bandage over the puncture site for the first 1 or 2 days. Follow-up care is a key part of your treatment and safety. Be sure to make and go to all appointments, and call your doctor if you are having problems. It's also a good idea to know your test results and keep a list of the medicines you take. When should you call for help? Call 911 anytime you think you may need emergency care. For example, call if: 
· You passed out (lost consciousness). · You have severe trouble breathing. · You have sudden chest pain and shortness of breath, or you cough up blood. · You have severe pain in your chest, shoulder, or belly. Call your doctor now or seek immediate medical care if: 
· You have new or worse shortness of breath. · Bright red blood has soaked through the bandage over the puncture site. · You have pain that does not get better after you take your pain medicine. · You are sick to your stomach or cannot keep fluids down. · You have a fever, chills, or body aches. · You have signs of infection, such as: 
¨ Increased pain, swelling, warmth, or redness. ¨ Red streaks leading from the puncture site. ¨ Pus draining from the puncture site. ¨ A fever. · You have new or worse pain at the puncture site. · You have new or worse belly swelling or bloating. · You have trouble passing urine or stool. · Your stools are black and tarlike or have streaks of blood. · You have pale-colored stools along with dark urine and itching. Watch closely for changes in your health, and be sure to contact your doctor if you have any problems. Where can you learn more? Go to http://cara-latonya.info/. Enter Z300 in the search box to learn more about \"Percutaneous Liver Biopsy: What to Expect at Home. \" Current as of: October 14, 2016 Content Version: 11.3 © 8671-1410 [employer]. Care instructions adapted under license by [employer] (which disclaims liability or warranty for this information). If you have questions about a medical condition or this instruction, always ask your healthcare professional. [place] [address] any warranty or liability for your use of this information. Sedation for a Medical Procedure: Care Instructions Your Care Instructions For a minor procedure or surgery, you will get a sedative to help you relax. This drug will make you sleepy. It is usually given in a vein (by IV). A shot may also be used to numb the area. If you had local anesthesia, you may feel some pain and discomfort as it wears off. If you have pain, don't be afraid to say so. Pain medicine works better if you take it before the pain gets bad. Common side effects from sedation include: · Feeling sleepy. (Your doctors and nurses will make sure you are not too sleepy to go home.) · Nausea and vomiting. This usually does not last long. · Feeling tired. Follow-up care is a key part of your treatment and safety. Be sure to make and go to all appointments, and call your doctor if you are having problems. It's also a good idea to know your test results and keep a list of the medicines you take. How can you care for yourself at home? Activity · Don't do anything for 24 hours that requires attention to detail. It takes time for the medicine effects to completely wear off. · For your safety, you should not drive or operate any machinery that could be dangerous until the medicine wears off and you can think clearly and react easily. · Rest when you feel tired. Getting enough sleep will help you recover. Diet · You can eat your normal diet, unless your doctor gives you other instructions. If your stomach is upset, try clear liquids and bland, low-fat foods like plain toast or rice. · Drink plenty of fluids (unless your doctor tells you not to). · Don't drink alcohol for 24 hours. Medicines · Be safe with medicines. Read and follow all instructions on the label. ¨ If the doctor gave you a prescription medicine for pain, take it as prescribed. ¨ If you are not taking a prescription pain medicine, ask your doctor if you can take an over-the-counter medicine. · If you think your pain medicine is making you sick to your stomach: 
¨ Take your medicine after meals (unless your doctor has told you not to). ¨ Ask your doctor for a different pain medicine. When should you call for help? Call 911 anytime you think you may need emergency care. For example, call if: 
· You have severe trouble breathing. · You passed out (lost consciousness). Call your doctor now or seek immediate medical care if: 
· You have trouble breathing. · You have ongoing or worsening nausea or vomiting. · You have a fever. · You have a new or worse headache. · The medicine is not wearing off and you can't think clearly. Watch closely for changes in your health, and be sure to contact your doctor if: 
· You do not get better as expected. Where can you learn more? Go to http://cara-latonya.info/. Enter V802 in the search box to learn more about \"Sedation for a Medical Procedure: Care Instructions. \" 
 Current as of: August 14, 2016 Content Version: 11.3 © 2878-9566 [employer]. Care instructions adapted under license by [employer] (which disclaims liability or warranty for this information). If you have questions about a medical condition or this instruction, always ask your healthcare professional. [place] [address] any warranty or liability for your use of this information. Chart Review Routing History No Routing History on File

## 2017-07-19 NOTE — DISCHARGE INSTRUCTIONS
Percutaneous Liver Biopsy: What to Expect at [facility]  Percutaneous liver biopsy is a procedure to take a tiny sample (biopsy) of your liver tissue. Percutaneous (say \"per-tyson-DOMI-annika-) means \"through the skin. \" The procedure is also called aspiration biopsy or fine-needle aspiration. The tissue sample is looked at under a microscope. Your doctor can look for infection or other liver problems. You may have some pain where the biopsy needle entered your skin (the puncture site). You may also have pain in your shoulder. This is called referred pain. It is caused by pain traveling along a nerve near the biopsy site. The referred pain usually lasts less than 12 hours. You may have a small amount of bleeding from the puncture site. You will need to take it easy at home for 1 or 2 days after the procedure. You will probably be able to return to work and most of your usual activities after that. This care sheet gives you a general idea about how long it will take for you to recover. But each person recovers at a different pace. Follow the steps below to get better as quickly as possible. How can you care for yourself at home? Activity  · Rest when you feel tired. Getting enough sleep will help you recover. · Try to walk each day. Start by walking a little more than you did the day before. Bit by bit, increase the amount you walk. Walking boosts blood flow and helps prevent pneumonia and constipation. · Avoid exercises that use your belly muscles and strenuous activities, such as bicycle riding, jogging, weight lifting, or aerobic exercise, for 1 week or until your doctor says it is okay. · Ask your doctor when you can drive again. · You will probably need to take 1 or 2 days off from work. It depends on the type of work you do and how you feel. · You will probably be able to shower the same day as the test, if your doctor says it is okay. Pat the puncture site dry.  Do not take a bath for at least 2 days after the test, or until your doctor tells you it is okay. Diet  · You can eat your normal diet. If your stomach is upset, try bland, low-fat foods like plain rice, broiled chicken, toast, and yogurt. · Drink plenty of fluids (unless your doctor tells you not to). Medicines  · Your doctor will tell you if and when you can restart your medicines. He or she will also give you instructions about taking any new medicines. · If you take blood thinners, such as warfarin (Coumadin), clopidogrel (Plavix), or aspirin, be sure to talk to your doctor. He or she will tell you if and when to start taking those medicines again. Make sure that you understand exactly what your doctor wants you to do. · Be safe with medicines. Take pain medicines exactly as directed. ¨ If the doctor gave you a prescription medicine for pain, take it as prescribed. ¨ If you are not taking a prescription pain medicine, take an over-the-counter medicine that your doctor recommends. Read and follow all instructions on the label. ¨ Do not take aspirin, ibuprofen (Advil, Motrin), naproxen (Aleve), or other nonsteroidal anti-inflammatory drugs (NSAIDs) unless your doctor says it is okay. · If you think your pain medicine is making you sick to your stomach:  ¨ Take your medicine after meals (unless your doctor has told you not to). ¨ Ask your doctor for a different kind of pain medicine. Care of the puncture site  · Keep a bandage over the puncture site for the first 1 or 2 days. Follow-up care is a key part of your treatment and safety. Be sure to make and go to all appointments, and call your doctor if you are having problems. It's also a good idea to know your test results and keep a list of the medicines you take. When should you call for help? Call 911 anytime you think you may need emergency care. For example, call if:  · You passed out (lost consciousness). · You have severe trouble breathing.   · You have sudden chest pain and shortness of breath, or you cough up blood. · You have severe pain in your chest, shoulder, or belly. Call your doctor now or seek immediate medical care if:  · You have new or worse shortness of breath. · Bright red blood has soaked through the bandage over the puncture site. · You have pain that does not get better after you take your pain medicine. · You are sick to your stomach or cannot keep fluids down. · You have a fever, chills, or body aches. · You have signs of infection, such as:  ¨ Increased pain, swelling, warmth, or redness. ¨ Red streaks leading from the puncture site. ¨ Pus draining from the puncture site. ¨ A fever. · You have new or worse pain at the puncture site. · You have new or worse belly swelling or bloating. · You have trouble passing urine or stool. · Your stools are black and tarlike or have streaks of blood. · You have pale-colored stools along with dark urine and itching. Watch closely for changes in your health, and be sure to contact your doctor if you have any problems. Where can you learn more? Go to http://cara-latonya.info/. Enter J331 in the search box to learn more about \"Percutaneous Liver Biopsy: What to Expect at Home. \"  Current as of: October 14, 2016  Content Version: 11.3  © 9940-4515 [email or web address]. Care instructions adapted under license by [employer] (which disclaims liability or warranty for this information). If you have questions about a medical condition or this instruction, always ask your healthcare professional. [place] [address] any warranty or liability for your use of this information. Sedation for a Medical Procedure: Care Instructions  Your Care Instructions  For a minor procedure or surgery, you will get a sedative to help you relax. This drug will make you sleepy. It is usually given in a vein (by IV). A shot may also be used to numb the area.   If you had local anesthesia, you may feel some pain and discomfort as it wears off. If you have pain, don't be afraid to say so. Pain medicine works better if you take it before the pain gets bad. Common side effects from sedation include:  · Feeling sleepy. (Your doctors and nurses will make sure you are not too sleepy to go home.)  · Nausea and vomiting. This usually does not last long. · Feeling tired. Follow-up care is a key part of your treatment and safety. Be sure to make and go to all appointments, and call your doctor if you are having problems. It's also a good idea to know your test results and keep a list of the medicines you take. How can you care for yourself at home? Activity  · Don't do anything for 24 hours that requires attention to detail. It takes time for the medicine effects to completely wear off. · For your safety, you should not drive or operate any machinery that could be dangerous until the medicine wears off and you can think clearly and react easily. · Rest when you feel tired. Getting enough sleep will help you recover. Diet  · You can eat your normal diet, unless your doctor gives you other instructions. If your stomach is upset, try clear liquids and bland, low-fat foods like plain toast or rice. · Drink plenty of fluids (unless your doctor tells you not to). · Don't drink alcohol for 24 hours. Medicines  · Be safe with medicines. Read and follow all instructions on the label. ¨ If the doctor gave you a prescription medicine for pain, take it as prescribed. ¨ If you are not taking a prescription pain medicine, ask your doctor if you can take an over-the-counter medicine. · If you think your pain medicine is making you sick to your stomach:  ¨ Take your medicine after meals (unless your doctor has told you not to). ¨ Ask your doctor for a different pain medicine. When should you call for help? Call 911 anytime you think you may need emergency care.  For example, call if:  · You have severe trouble breathing. · You passed out (lost consciousness). Call your doctor now or seek immediate medical care if:  · You have trouble breathing. · You have ongoing or worsening nausea or vomiting. · You have a fever. · You have a new or worse headache. · The medicine is not wearing off and you can't think clearly. Watch closely for changes in your health, and be sure to contact your doctor if:  · You do not get better as expected. Where can you learn more? Go to http://cara-latonya.info/. Enter Z471 in the search box to learn more about \"Sedation for a Medical Procedure: Care Instructions. \"  Current as of: August 14, 2016  Content Version: 11.3  © 3867-4059 [employer]. Care instructions adapted under license by [employer] (which disclaims liability or warranty for this information). If you have questions about a medical condition or this instruction, always ask your healthcare professional. [place] [address] any warranty or liability for your use of this information.

## 2017-07-19 NOTE — IP AVS SNAPSHOT
[address] 
[phone] Patient: Maya Saba MRN: [id number] :1985 You are allergic to the following Allergen Reactions Peanut Swelling Tree Nut Swelling Recent Documentation Height Weight BMI Smoking Status 2.007 m 90.7 kg 22.53 kg/m2 Current Every Day Smoker Emergency Contacts Name Discharge Info Relation Home Work Mobile JaymeAsia DISCHARGE CAREGIVER [3] Mother [14]   [phone] JaymeGenny DISCHARGE CAREGIVER [3] Sister [23]   [phone] About your hospitalization You were admitted on:  2017 You last received care in the:  [facility] You were discharged on:  2017 Unit phone number:  [phone] Why you were hospitalized Your primary diagnosis was:  Not on File Providers Seen During Your Hospitalizations Provider Role Specialty Primary office phone Elmer Valdes MD Attending Provider Gastroenterology [phone] Your Primary Care Physician (PCP) Primary Care Physician Office Phone Office Fax NOT ON FILE ** None ** ** None ** Follow-up Information None Your Appointments 2017  8:20 AM EDT ROUTINE CARE with Matt Herring MD  
[address] [place] [address]  
[phone] Current Discharge Medication List  
  
ASK your doctor about these medications Dose & Instructions Dispensing Information Comments Morning Noon Evening Bedtime Comp. Vanessatie Romulo Your last dose was: Your next dose is:    
   
   
 Dose:  1 Package 1 Package by Does Not Apply route daily. Quantity:  1 Package Refills:  0  
     
   
   
   
  
 furosemide 20 mg tablet Commonly known as:  LASIX Your last dose was: Your next dose is:    
   
   
 Dose:  20 mg Take 1 Tab by mouth daily. Quantity:  90 Tab Refills:  0  
     
   
   
   
  
 OLANZapine 20 mg tablet Commonly known as:  ZyPREXA Your last dose was: Your next dose is: TAKE 1 TABLET BY MOUTH AT BEDTIME Refills:  2  
     
   
   
   
  
 ondansetron 4 mg disintegrating tablet Commonly known as:  ZOFRAN ODT Your last dose was: Your next dose is:    
   
   
 DISSOLVE 1 TABLET ON TONGUE EVERY 6 HOURS AS NEEDED FOR NAUSEA Refills:  0  
     
   
   
   
  
 * oxyCODONE 5 mg capsule Commonly known as:  OXYIR Your last dose was: Your next dose is:    
   
   
 Dose:  5 mg Take 1 Cap by mouth every four (4) hours as needed. Max Daily Amount: 30 mg.  
 Quantity:  10 Cap Refills:  0  
     
   
   
   
  
 * oxyCODONE IR 5 mg immediate release tablet Commonly known as:  Jenharsh Tolbert Your last dose was: Your next dose is: TAKE 1 TABLET BY MOUTH EVERY 4 HOURS AS NEEDED. MAX OF 30MG PER DAY , MAX 6 TABS PER DAY Refills:  0  
     
   
   
   
  
 promethazine 25 mg tablet Commonly known as:  PHENERGAN Your last dose was: Your next dose is:    
   
   
 Dose:  25 mg Take 1 Tab by mouth every six (6) hours as needed. Quantity:  12 Tab Refills:  0  
     
   
   
   
  
 spironolactone 100 mg tablet Commonly known as:  ALDACTONE Your last dose was: Your next dose is:    
   
   
 Dose:  100 mg Take 100 mg by mouth daily. Refills:  0  
     
   
   
   
  
 ZOLOFT 50 mg tablet Generic drug:  sertraline Your last dose was: Your next dose is:    
   
   
 Dose:  50 mg Take 50 mg by mouth daily. Refills:  0  
     
   
   
   
  
 * Notice: This list has 2 medication(s) that are the same as other medications prescribed for you.  Read the directions carefully, and ask your doctor or other care provider to review them with you. Discharge Instructions Percutaneous Liver Biopsy: What to Expect at Home Your Recovery Percutaneous liver biopsy is a procedure to take a tiny sample (biopsy) of your liver tissue. Percutaneous (say \"per-kew-Isidoro-) means \"through the skin. \" The procedure is also called aspiration biopsy or fine-needle aspiration. The tissue sample is looked at under a microscope. Your doctor can look for infection or other liver problems. You may have some pain where the biopsy needle entered your skin (the puncture site). You may also have pain in your shoulder. This is called referred pain. It is caused by pain traveling along a nerve near the biopsy site. The referred pain usually lasts less than 12 hours. You may have a small amount of bleeding from the puncture site. You will need to take it easy at home for 1 or 2 days after the procedure. You will probably be able to return to work and most of your usual activities after that. This care sheet gives you a general idea about how long it will take for you to recover. But each person recovers at a different pace. Follow the steps below to get better as quickly as possible. How can you care for yourself at home? Activity · Rest when you feel tired. Getting enough sleep will help you recover. · Try to walk each day. Start by walking a little more than you did the day before. Bit by bit, increase the amount you walk. Walking boosts blood flow and helps prevent pneumonia and constipation. · Avoid exercises that use your belly muscles and strenuous activities, such as bicycle riding, jogging, weight lifting, or aerobic exercise, for 1 week or until your doctor says it is okay. · Ask your doctor when you can drive again. · You will probably need to take 1 or 2 days off from work. It depends on the type of work you do and how you feel. · You will probably be able to shower the same day as the test, if your doctor says it is okay. Pat the puncture site dry. Do not take a bath for at least 2 days after the test, or until your doctor tells you it is okay. Diet · You can eat your normal diet. If your stomach is upset, try bland, low-fat foods like plain rice, broiled chicken, toast, and yogurt. · Drink plenty of fluids (unless your doctor tells you not to). Medicines · Your doctor will tell you if and when you can restart your medicines. He or she will also give you instructions about taking any new medicines. · If you take blood thinners, such as warfarin (Coumadin), clopidogrel (Plavix), or aspirin, be sure to talk to your doctor. He or she will tell you if and when to start taking those medicines again. Make sure that you understand exactly what your doctor wants you to do. · Be safe with medicines. Take pain medicines exactly as directed. ¨ If the doctor gave you a prescription medicine for pain, take it as prescribed. ¨ If you are not taking a prescription pain medicine, take an over-the-counter medicine that your doctor recommends. Read and follow all instructions on the label. ¨ Do not take aspirin, ibuprofen (Advil, Motrin), naproxen (Aleve), or other nonsteroidal anti-inflammatory drugs (NSAIDs) unless your doctor says it is okay. · If you think your pain medicine is making you sick to your stomach: 
¨ Take your medicine after meals (unless your doctor has told you not to). ¨ Ask your doctor for a different kind of pain medicine. Care of the puncture site · Keep a bandage over the puncture site for the first 1 or 2 days. Follow-up care is a key part of your treatment and safety. Be sure to make and go to all appointments, and call your doctor if you are having problems. It's also a good idea to know your test results and keep a list of the medicines you take. When should you call for help? Call 911 anytime you think you may need emergency care. For example, call if: 
· You passed out (lost consciousness). · You have severe trouble breathing. · You have sudden chest pain and shortness of breath, or you cough up blood. · You have severe pain in your chest, shoulder, or belly. Call your doctor now or seek immediate medical care if: 
· You have new or worse shortness of breath. · Bright red blood has soaked through the bandage over the puncture site. · You have pain that does not get better after you take your pain medicine. · You are sick to your stomach or cannot keep fluids down. · You have a fever, chills, or body aches. · You have signs of infection, such as: 
¨ Increased pain, swelling, warmth, or redness. ¨ Red streaks leading from the puncture site. ¨ Pus draining from the puncture site. ¨ A fever. · You have new or worse pain at the puncture site. · You have new or worse belly swelling or bloating. · You have trouble passing urine or stool. · Your stools are black and tarlike or have streaks of blood. · You have pale-colored stools along with dark urine and itching. Watch closely for changes in your health, and be sure to contact your doctor if you have any problems. Where can you learn more? Go to http://cara-latonya.info/. Enter R971 in the search box to learn more about \"Percutaneous Liver Biopsy: What to Expect at Home. \" Current as of: October 14, 2016 Content Version: 11.3 © 3574-2677 retickr. Care instructions adapted under license by [employer] (which disclaims liability or warranty for this information). If you have questions about a medical condition or this instruction, always ask your healthcare professional. [place] [address] any warranty or liability for your use of this information. Sedation for a Medical Procedure: Care Instructions Your Care Instructions For a minor procedure or surgery, you will get a sedative to help you relax. This drug will make you sleepy. It is usually given in a vein (by IV). A shot may also be used to numb the area. If you had local anesthesia, you may feel some pain and discomfort as it wears off. If you have pain, don't be afraid to say so. Pain medicine works better if you take it before the pain gets bad. Common side effects from sedation include: · Feeling sleepy. (Your doctors and nurses will make sure you are not too sleepy to go home.) · Nausea and vomiting. This usually does not last long. · Feeling tired. Follow-up care is a key part of your treatment and safety. Be sure to make and go to all appointments, and call your doctor if you are having problems. It's also a good idea to know your test results and keep a list of the medicines you take. How can you care for yourself at home? Activity · Don't do anything for 24 hours that requires attention to detail. It takes time for the medicine effects to completely wear off. · For your safety, you should not drive or operate any machinery that could be dangerous until the medicine wears off and you can think clearly and react easily. · Rest when you feel tired. Getting enough sleep will help you recover. Diet · You can eat your normal diet, unless your doctor gives you other instructions. If your stomach is upset, try clear liquids and bland, low-fat foods like plain toast or rice. · Drink plenty of fluids (unless your doctor tells you not to). · Don't drink alcohol for 24 hours. Medicines · Be safe with medicines. Read and follow all instructions on the label. ¨ If the doctor gave you a prescription medicine for pain, take it as prescribed. ¨ If you are not taking a prescription pain medicine, ask your doctor if you can take an over-the-counter medicine.  
· If you think your pain medicine is making you sick to your stomach: 
 ¨ Take your medicine after meals (unless your doctor has told you not to). ¨ Ask your doctor for a different pain medicine. When should you call for help? Call 911 anytime you think you may need emergency care. For example, call if: 
· You have severe trouble breathing. · You passed out (lost consciousness). Call your doctor now or seek immediate medical care if: 
· You have trouble breathing. · You have ongoing or worsening nausea or vomiting. · You have a fever. · You have a new or worse headache. · The medicine is not wearing off and you can't think clearly. Watch closely for changes in your health, and be sure to contact your doctor if: 
· You do not get better as expected. Where can you learn more? Go to http://cara-latonya.info/. Enter A928 in the search box to learn more about \"Sedation for a Medical Procedure: Care Instructions. \" Current as of: August 14, 2016 Content Version: 11.3 © 8847-1193 IntroFly. Care instructions adapted under license by [employer] (which disclaims liability or warranty for this information). If you have questions about a medical condition or this instruction, always ask your healthcare professional. [place] [address] any warranty or liability for your use of this information. Discharge Orders None Introducing [facility]! Zach Ramirez introduces WowOwow patient portal. Now you can access parts of your medical record, email your doctor's office, and request medication refills online. 1. In your internet browser, go to [email or web address] 2. Click on the First Time User? Click Here link in the Sign In box. You will see the New Member Sign Up page. 3. Enter your WowOwow Access Code exactly as it appears below. You will not need to use this code after youve completed the sign-up process. If you do not sign up before the expiration date, you must request a new code. · Thinker Thing Access Code: [email or web address] Expires: 10/16/2017  2:17 PM 
 
4. Enter the last four digits of your Social Security Number (xxxx) and Date of Birth (mm/dd/yyyy) as indicated and click Submit. You will be taken to the next sign-up page. 5. Create a Thinker Thing ID. This will be your Thinker Thing login ID and cannot be changed, so think of one that is secure and easy to remember. 6. Create a Thinker Thing password. You can change your password at any time. 7. Enter your Password Reset Question and Answer. This can be used at a later time if you forget your password. 8. Enter your e-mail address. You will receive e-mail notification when new information is available in [address] 9. Click Sign Up. You can now view and download portions of your medical record. 10. Click the Download Summary menu link to download a portable copy of your medical information. If you have questions, please visit the Frequently Asked Questions section of the Thinker Thing website. Remember, Thinker Thing is NOT to be used for urgent needs. For medical emergencies, dial 911. Now available from your iPhone and Android! General Information Please provide this summary of care documentation to your next provider. Patient Signature:  ____________________________________________________________ Date:  ____________________________________________________________  
  
Alana Fernando Provider Signature:  ____________________________________________________________ Date:  ____________________________________________________________

## 2017-07-19 NOTE — H&P
Interventional Radiology History and Physical (Inpatient)    Patient: Harjinder Hampton 29 y.o. male     Referring Physician:  Harriett Mcknight MD    Chief Complaint: No chief complaint on file. History of Present Illness: conscious sedation (Versed and fentanyl) for liver biopsy    History:  Past Medical History:   Diagnosis Date    Anxiety     Bipolar 1 disorder (HCC)     Cirrhosis, alcoholic ([address]     Depression      Family History   Problem Relation Age of Onset    Diabetes Mother     Asthma Mother     Hypertension Mother     Cancer Father      Social History     Social History    Marital status: LEGALLY      Spouse name: N/A    Number of children: N/A    Years of education: N/A     Occupational History    Not on file. Social History Main Topics    Smoking status: Current Every Day Smoker    Smokeless tobacco: Not on file    Alcohol use No      Comment: socially/ April 2017 last drink    Drug use: No    Sexual activity: No     Other Topics Concern    Not on file     Social History Narrative       Allergies: Allergies   Allergen Reactions    Peanut Swelling    Tree Nut Swelling       Current Medications:  Current Outpatient Prescriptions   Medication Sig    OLANZapine (ZYPREXA) 20 mg tablet TAKE 1 TABLET BY MOUTH AT BEDTIME    ondansetron (ZOFRAN ODT) 4 mg disintegrating tablet DISSOLVE 1 TABLET ON TONGUE EVERY 6 HOURS AS NEEDED FOR NAUSEA    oxyCODONE IR (ROXICODONE) 5 mg immediate release tablet TAKE 1 TABLET BY MOUTH EVERY 4 HOURS AS NEEDED. MAX OF 30MG PER DAY , MAX 6 TABS PER DAY    spironolactone (ALDACTONE) 100 mg tablet Take 100 mg by mouth daily.  promethazine (PHENERGAN) 25 mg tablet Take 1 Tab by mouth every six (6) hours as needed.  oxyCODONE (OXYIR) 5 mg capsule Take 1 Cap by mouth every four (4) hours as needed. Max Daily Amount: 30 mg.    furosemide (LASIX) 20 mg tablet Take 1 Tab by mouth daily.     sertraline (ZOLOFT) 50 mg tablet Take 50 mg by mouth daily.    Comp. Stocking,Thigh,Long,Large misc 1 Package by Does Not Apply route daily. Current Facility-Administered Medications   Medication Dose Route Frequency    midazolam (VERSED) injection 5 mg  5 mg IntraVENous RAD PRN    fentaNYL citrate (PF) injection 100 mcg  100 mcg IntraVENous RAD PRN        Physical Exam:  Height 6' 7\" (2.007 m), weight 90.7 kg (200 lb). GENERAL: alert, cooperative, no distress, appears stated age, LUNG: clear to auscultation bilaterally, HEART: regular rate and rhythm    Findings/Diagnosis: conscious sedation (Versed and fentanyl) for liver biopsy    Alerts:      Laboratory:    No results for input(s): HGB, HGBEXT, HCT, HCTEXT, WBC, PLT, PLTEXT, INR, BUN, CREA, K, CRCLT, HGBEXT, HCTEXT, PLTEXT in the last 72 hours. No lab exists for component: PTT, PT, INREXT      Plan of Care/Planned Procedure:  Risks, benefits, and alternatives reviewed with patient and he agrees to proceed with the procedure. Full dictated report to follow.

## 2017-07-19 NOTE — PROGRESS NOTES
Pt rec'd ambulatory to Rad [facility] for US guided liver biopsy 0815. Pt is alert and oriented x 3. Pt describes R flank/R upper abdominal pain worse with motion and touching the liver region of his abdomen 7/10. Lungs CTA, S1S2.  22 GA PIV initiated R AC. Recent lab results from [facility] obtained. Pt's mother and his nurse to bedside for consent. 0900 Dr Gerhardt Sink to discuss procedure, risks and methods and plan made for conscious sedation. Pt with family awaiting procedure denies discomfort presently, positioned for comfort. Consent obtained. 1110 To US. Patient requests no nasal oxygen unless necessary and as pulse ox is 100% on room air, oxygen not applied. [facility] guided liver biopsy complete. Start time [id number], stop time 1145. Total of Versed 3 mg and Fentanyl 100 mcg IV used for procedure. Pt tolerated well. No supplemental oxygen required during procedure. Bandaid to R epigastric area is dry and intact. Pt's mother notified procedure is complete and pt is currently asleep. When he awakes, she will be brought to Rad [facility] per her request.    1230 Pt HOB to low Gastelum position, pt instructed not to tense abdominaL muscles. Pt states the biopsy site is tender. Pt taking meal.  Mother to bedside. 1330 Discharge instructions reviewed with patient and his mother and written copy provided to them. Pt discharged via wheelchair to vehicle to care of his mother.

## 2017-07-19 NOTE — TELEPHONE ENCOUNTER
Attempted to reach patient, got someone else who stated he was \"not here right now\". Reviewed his EKG in April, pt's QTc is normal. Will send a little Zofran but ask pt to follow up with me.     Thank you,    Tiburcio Runner, MD  Family Medicine Resident

## 2017-07-21 ENCOUNTER — HOSPITAL ENCOUNTER (EMERGENCY)
Age: 32
Discharge: HOME | End: 2017-07-21
Attending: EMERGENCY MEDICINE
Payer: MEDICAID

## 2017-07-21 VITALS
OXYGEN SATURATION: 99 % | DIASTOLIC BLOOD PRESSURE: 65 MMHG | HEART RATE: 62 BPM | BODY MASS INDEX: 21.64 KG/M2 | RESPIRATION RATE: 18 BRPM | SYSTOLIC BLOOD PRESSURE: 121 MMHG | HEIGHT: 78 IN | TEMPERATURE: 98.8 F | WEIGHT: 187 LBS

## 2017-07-21 DIAGNOSIS — N48.30 PRIAPISM: Primary | ICD-10-CM

## 2017-07-21 PROCEDURE — 74011250636 HC RX REV CODE- 250/636: Performed by: PHYSICIAN ASSISTANT

## 2017-07-21 PROCEDURE — 96361 HYDRATE IV INFUSION ADD-ON: CPT

## 2017-07-21 PROCEDURE — 75810000279 HC INJ/IRRIG FOR PRIAPISM

## 2017-07-21 PROCEDURE — 74011250636 HC RX REV CODE- 250/636: Performed by: UROLOGY

## 2017-07-21 PROCEDURE — 96374 THER/PROPH/DIAG INJ IV PUSH: CPT

## 2017-07-21 PROCEDURE — 99284 EMERGENCY DEPT VISIT MOD MDM: CPT

## 2017-07-21 PROCEDURE — 96372 THER/PROPH/DIAG INJ SC/IM: CPT

## 2017-07-21 RX ORDER — OXYCODONE AND ACETAMINOPHEN 5; 325 MG/1; MG/1
1 TABLET ORAL
Status: DISCONTINUED | OUTPATIENT
Start: 2017-07-21 | End: 2017-07-21

## 2017-07-21 RX ORDER — CIPROFLOXACIN 500 MG/1
500 TABLET ORAL 2 TIMES DAILY
Qty: 14 TAB | Refills: 0 | Status: SHIPPED | OUTPATIENT
Start: 2017-07-21 | End: 2017-07-28

## 2017-07-21 RX ORDER — HYDROMORPHONE HYDROCHLORIDE 1 MG/ML
1 INJECTION, SOLUTION INTRAMUSCULAR; INTRAVENOUS; SUBCUTANEOUS
Status: COMPLETED | OUTPATIENT
Start: 2017-07-21 | End: 2017-07-21

## 2017-07-21 RX ORDER — PHENYLEPHRINE HYDROCHLORIDE 10 MG/ML
100 INJECTION INTRAVENOUS AS NEEDED
Status: DISCONTINUED | OUTPATIENT
Start: 2017-07-21 | End: 2017-07-21 | Stop reason: HOSPADM

## 2017-07-21 RX ADMIN — HYDROMORPHONE HYDROCHLORIDE 1 MG: 1 INJECTION, SOLUTION INTRAMUSCULAR; INTRAVENOUS; SUBCUTANEOUS at 17:42

## 2017-07-21 RX ADMIN — PHENYLEPHRINE HYDROCHLORIDE 100 MCG: 10 INJECTION INTRAVENOUS at 17:00

## 2017-07-21 RX ADMIN — SODIUM CHLORIDE 1000 ML: 900 INJECTION, SOLUTION INTRAVENOUS at 17:23

## 2017-07-21 NOTE — ED PROVIDER NOTES
HPI Comments: 29 y.o. male with past medical history significant for alcoholic cirrhosis presents with complaints of priapism. The pt states that he has had an erection since 0600 this morning. He explained \"this happened to me one time before when I was 18.\"  He denies hx of sickle cell anemia. He terri using Viagra or illicit drug use. He states that his zoloft dosage was increased from 50 to 100 mg 2 days ago. He denies any trauma to the penis. He denies any testicular pain. There are no other acute medical complaints at this time. PCP: Not On File Bsjackelin Reed         Past Medical History:   Diagnosis Date    Anxiety     Bipolar 1 disorder [address]     Cirrhosis, alcoholic ([facility]     Depression        History reviewed. No pertinent surgical history. Family History:   Problem Relation Age of Onset    Diabetes Mother     Asthma Mother     Hypertension Mother     Cancer Father        Social History     Social History    Marital status: LEGALLY      Spouse name: N/A    Number of children: N/A    Years of education: N/A     Occupational History    Not on file. Social History Main Topics    Smoking status: Current Every Day Smoker    Smokeless tobacco: Never Used    Alcohol use 1.2 oz/week     2 Cans of beer per week    Drug use: No    Sexual activity: No     Other Topics Concern    Not on file     Social History Narrative         ALLERGIES: Peanut and Tree nut    Review of Systems   Constitutional: Negative for activity change, appetite change, diaphoresis and fever. HENT: Negative for ear discharge, ear pain, facial swelling, rhinorrhea, sore throat, tinnitus, trouble swallowing and voice change. Eyes: Negative for photophobia, pain, discharge, redness and visual disturbance. Respiratory: Negative for cough, chest tightness, shortness of breath, wheezing and stridor. Cardiovascular: Negative for chest pain and palpitations.    Gastrointestinal: Negative for abdominal pain, constipation, diarrhea, nausea and vomiting. Endocrine: Negative for polydipsia and polyuria. Genitourinary: Positive for penile pain. Negative for dysuria, flank pain and hematuria. Musculoskeletal: Negative for arthralgias, back pain and myalgias. Skin: Negative for color change and rash. Neurological: Negative for dizziness, syncope, speech difficulty, light-headedness and numbness. Psychiatric/Behavioral: Negative for behavioral problems. Vitals:    07/21/17 1625   BP: 118/59   Pulse: 91   Resp: 18   Temp: 98.8 °F (37.1 °C)   SpO2: 97%   Weight: 84.8 kg (187 lb)   Height: 6' 7\" (2.007 m)            Physical Exam   Constitutional: He is oriented to person, place, and time. He appears well-developed and well-nourished. No distress. HENT:   Head: Normocephalic and atraumatic. Eyes: Conjunctivae are normal. Pupils are equal, round, and reactive to light. Neck: Normal range of motion. Neck supple. Cardiovascular: Normal rate, regular rhythm and normal heart sounds. Pulmonary/Chest: Effort normal and breath sounds normal. No respiratory distress. He has no wheezes. Abdominal: Soft. Bowel sounds are normal. He exhibits no distension. There is no tenderness. Genitourinary:   Genitourinary Comments: Painful erection. Cremasteric reflex present bilaterally. No testicular pain. Pt is circumcised. Musculoskeletal: Normal range of motion. Neurological: He is alert and oriented to person, place, and time. Skin: Skin is warm. He is not diaphoretic. MDM  Number of Diagnoses or Management Options  Priapism:   Diagnosis management comments: Pt presents with priapism. Consulted urology and spoke to Dr. Emerson Gordon (urology) who agreed to come to ED to drain. Dr. Emerson Gordon performed drainage of priapism and pt reports relief of symptoms. Will start on abx and advise follow up with family doctor for further evaluation of symptoms.   Reviewed treatment plan with attending and they agree.   Sanjuana Deshpande PA-C    ED Course       Procedures

## 2017-07-21 NOTE — ED TRIAGE NOTES
Pt has had an erection since 6am. Hx of liver biopsy 2 days ago. MD increased Zoloft dose to 100mg 2 days ago.

## 2017-07-21 NOTE — DISCHARGE INSTRUCTIONS
Priapism (Prolonged Erection): Care Instructions  Your Care Instructions  Priapism (say \"NUG-wo-dsn-um\") is an erection that does not go away. This happens when the penis fills with fluid without sexual stimulation. And it does not go away after orgasm. It may last on and off for days or weeks. The cause of priapism is often not known. But it can happen to men who have sickle cell disease or diabetes. Taking or using medicine for erection problems may also cause priapism. Your doctor may have removed blood from your penis to ease the pain. And he or she may have given you a shot of medicine to soften the erection. Follow-up care is a key part of your treatment and safety. Be sure to make and go to all appointments, and call your doctor if you are having problems. It's also a good idea to know your test results and keep a list of the medicines you take. How can you care for yourself at home? · Put ice or a cold pack on the area for 10 to 20 minutes at a time. Put a thin cloth between the ice and your skin. · Urinate often. Don't wait until you feel the need. · Avoid the medicine that may have caused the painful erection. When should you call for help? Call your doctor now or seek immediate medical care if:  · You have a new painful erection that does not go away. Watch closely for changes in your health, and be sure to contact your doctor if:  · You do not get better as expected. Where can you learn more? Go to http://cara-latonya.info/. Enter B643 in the search box to learn more about \"Priapism (Prolonged Erection): Care Instructions. \"  Current as of: March 14, 2017  Content Version: 11.3  © 3654-9925 [employer]. Care instructions adapted under license by [employer] (which disclaims liability or warranty for this information).  If you have questions about a medical condition or this instruction, always ask your healthcare professional. [employer] disclaims any warranty or liability for your use of this information. We hope that we have addressed all of your medical concerns. The examination and treatment you received in the Emergency Department were for an emergent problem and were not intended as complete care. It is important that you follow up with your healthcare provider(s) for ongoing care. If your symptoms worsen or do not improve as expected, and you are unable to reach your usual health care provider(s), you should return to the Emergency Department. Today's healthcare is undergoing tremendous change, and patient satisfaction surveys are one of the many tools to assess the quality of medical care. You may receive a survey from the [employer] regarding your experience in the Emergency Department. I hope that your experience has been completely positive, particularly the medical care that I provided. As such, please participate in the survey; anything less than excellent does not meet my expectations or intentions. [facility] and [address] [place] [address] participate in nationally recognized quality of care measures. If your blood pressure is greater than 120/80, as reported below, we urge that you seek medical care to address the potential of high blood pressure, commonly known as hypertension. Hypertension can be hereditary or can be caused by certain medical conditions, pain, stress, or \"white coat syndrome. \"       Please make an appointment with your health care provider(s) for follow up of your Emergency Department visit. VITALS:   Patient Vitals for the past 8 hrs:   Temp Pulse Resp BP SpO2   07/21/17 1625 98.8 °F (37.1 °C) 91 18 118/59 97 %          Thank you for allowing us to provide you with medical care today. We realize that you have many choices for your emergency care needs. Please choose us in the future for any continued health care needs. Regina Jefferson Leyva Child, [address]: [phone]            No results found for this or any previous visit (from the past 24 hour(s)). No results found.

## 2017-07-23 NOTE — CONSULTS
Urology Consult    Patient: Salvatore Wick MRN: [id number]  SSN: [id number]    YOB: 1985  Age: 29 y.o. Sex: male          Date of Consultation:  July 23, 2017  Requesting Physician: No att. providers found  Reason for Consultation: priapism           Assessment/Plan:  Pt reports erection since 6am today. Denies trauma or ED medication, or illicit drug use recently. Prior occurrence 10 years ago. Irrigated old blood via 16ga needle. Instillation of phenylephrine to create a sustained detumescence. To agrees to follow up with Urology and Hematology in the next 1-2 weeks for further evaluation. History of Present Illness:  Patient is a 29 y.o. male admitted 7/21/2017 to the hospital for There are no admission diagnoses documented for this encounter. .  He has a unwanted sustained erection since this am. Recent increase in his Zoloft dosing. Past Medical History: Allergies   Allergen Reactions    Peanut Swelling    Tree Nut Swelling      Prior to Admission medications    Medication Sig Start Date End Date Taking? Authorizing Provider   ciprofloxacin HCl (CIPRO) 500 mg tablet Take 1 Tab by mouth two (2) times a day for 7 days. 7/21/17 7/28/17 Yes Maryan Herrera PA-C   ondansetron (ZOFRAN ODT) 4 mg disintegrating tablet DISSOLVE 1 TABLET ON TONGUE EVERY 6 HOURS AS NEEDED FOR NAUSEA. Please follow up in clinic. 7/19/17   Dallas Tee MD   OLANZapine (ZYPREXA) 20 mg tablet TAKE 1 TABLET BY MOUTH AT BEDTIME 5/12/17   Historical Provider   oxyCODONE IR (ROXICODONE) 5 mg immediate release tablet TAKE 1 TABLET BY MOUTH EVERY 4 HOURS AS NEEDED. MAX OF 30MG PER DAY , MAX 6 TABS PER DAY 5/20/17   Historical Provider   spironolactone (ALDACTONE) 100 mg tablet Take 100 mg by mouth daily. Historical Provider   promethazine (PHENERGAN) 25 mg tablet Take 1 Tab by mouth every six (6) hours as needed.  5/20/17   Tal Kramer NP   oxyCODONE (OXYIR) 5 mg capsule Take 1 Cap by mouth every four (4) hours as needed. Max Daily Amount: 30 mg. 5/20/17   Jhon Lerma NP   furosemide (LASIX) 20 mg tablet Take 1 Tab by mouth daily. 4/10/17   Flaquita Long MD   sertraline (ZOLOFT) 50 mg tablet Take 50 mg by mouth daily. Chato Rojas MD   Comp. Stocking,Thigh,Long,Large misc 1 Package by Does Not Apply route daily. 4/5/17   Issac Duarte MD      PMHx:  has a past medical history of Anxiety; Bipolar 1 disorder ([facility] Cirrhosis, alcoholic ([facility] and Depression. PSurgHx:  has no past surgical history on file. PSocHx:  reports that he has been smoking. He has never used smokeless tobacco. He reports that he drinks about 1.2 oz of alcohol per week  He reports that he does not use illicit drugs. ROS:  Admission ROS by No admitting provider for patient encounter. from 7/21/2017 were reviewed with the patient and changes (other than per HPI) include: none. Physical Exam:    Vitals:   No data recorded. Blood pressure 121/65, pulse 62, temperature 98.8 °F (37.1 °C), resp. rate 18, height 6' 7\" (2.007 m), weight 84.8 kg (187 lb), SpO2 99 %.   I&O's:     GENERAL: WD, male, uncomfortable  SKIN:  No clubbing, cyanosis, or edema  ABDOMEN: Soft, non-tender without masses  FLANKS: No CVAT  BLADDER: Not palpable  :  Normal rigid male phallus, scrotum and contents normal  LYMPH: No cervical,k supraclavicular or axillary HAYES      Lab Results   Component Value Date/Time    WBC 5.5 05/20/2017 02:36 PM    HCT 38.9 05/20/2017 02:36 PM    PLATELET 547 73/03/6836 02:36 PM    Sodium 140 05/20/2017 02:36 PM    Potassium 3.4 05/20/2017 02:36 PM    Chloride 102 05/20/2017 02:36 PM    CO2 29 05/20/2017 02:36 PM    BUN 8 05/20/2017 02:36 PM    Creatinine 1.01 05/20/2017 02:36 PM    Glucose 79 05/20/2017 02:36 PM    Calcium 8.9 05/20/2017 02:36 PM    INR 1.1 05/20/2017 02:36 PM       UA:   Lab Results   Component Value Date/Time    Color YELLOW/STRAW 05/20/2017 02:37 PM    Appearance CLEAR 05/20/2017 02:37 PM    Specific gravity 1.009 05/20/2017 02:37 PM    pH (UA) 6.5 05/20/2017 02:37 PM    Protein NEGATIVE  05/20/2017 02:37 PM    Glucose NEGATIVE  05/20/2017 02:37 PM    Ketone NEGATIVE  05/20/2017 02:37 PM    Bilirubin NEGATIVE  05/20/2017 02:37 PM    Urobilinogen 0.2 05/20/2017 02:37 PM    Nitrites NEGATIVE  05/20/2017 02:37 PM    Leukocyte Esterase NEGATIVE  05/20/2017 02:37 PM    Epithelial cells FEW 05/20/2017 02:37 PM    Bacteria NEGATIVE  05/20/2017 02:37 PM    WBC 0-4 05/20/2017 02:37 PM    RBC 0-5 05/20/2017 02:37 PM       Cultures:     Xrays:     Signed By: Malu Keane MD  - July 23, 2017

## 2017-07-28 ENCOUNTER — OFFICE VISIT (OUTPATIENT)
Dept: FAMILY MEDICINE CLINIC | Age: 32
End: 2017-07-28

## 2017-07-28 VITALS
RESPIRATION RATE: 16 BRPM | OXYGEN SATURATION: 98 % | WEIGHT: 190 LBS | SYSTOLIC BLOOD PRESSURE: 116 MMHG | HEIGHT: 78 IN | HEART RATE: 75 BPM | BODY MASS INDEX: 21.98 KG/M2 | DIASTOLIC BLOOD PRESSURE: 76 MMHG | TEMPERATURE: 97.8 F

## 2017-07-28 DIAGNOSIS — Z91.010 PEANUT ALLERGY: ICD-10-CM

## 2017-07-28 DIAGNOSIS — N48.30 PRIAPISM: Primary | ICD-10-CM

## 2017-07-28 RX ORDER — EPINEPHRINE 0.3 MG/.3ML
0.3 INJECTION SUBCUTANEOUS
Qty: 1 SYRINGE | Refills: 1 | Status: SHIPPED | OUTPATIENT
Start: 2017-07-28 | End: 2022-06-24

## 2017-07-28 NOTE — PROGRESS NOTES
Chief Complaint   Patient presents with    Follow-up     liver biopsy     1. Have you been to the ER, urgent care clinic since your last visit? Hospitalized since your last visit? No    2. Have you seen or consulted any other health care providers outside of the [address] [place] [address] since your last visit? Include any pap smears or colon screening.  No     Liver biopsy--wants to go over results--Dr Sophia Joseph to er---had priapism    Recommended blood work for sickle cell    Need a referral to urologist--[facility]

## 2017-07-28 NOTE — PATIENT INSTRUCTIONS
Priapism (Prolonged Erection): Care Instructions  Your Care Instructions  Priapism (say \"RRP-ix-dsj-um\") is an erection that does not go away. This happens when the penis fills with fluid without sexual stimulation. And it does not go away after orgasm. It may last on and off for days or weeks. The cause of priapism is often not known. But it can happen to men who have sickle cell disease or diabetes. Taking or using medicine for erection problems may also cause priapism. Your doctor may have removed blood from your penis to ease the pain. And he or she may have given you a shot of medicine to soften the erection. Follow-up care is a key part of your treatment and safety. Be sure to make and go to all appointments, and call your doctor if you are having problems. It's also a good idea to know your test results and keep a list of the medicines you take. How can you care for yourself at home? · Put ice or a cold pack on the area for 10 to 20 minutes at a time. Put a thin cloth between the ice and your skin. · Urinate often. Don't wait until you feel the need. · Avoid the medicine that may have caused the painful erection. When should you call for help? Call your doctor now or seek immediate medical care if:  · You have a new painful erection that does not go away. Watch closely for changes in your health, and be sure to contact your doctor if:  · You do not get better as expected. Where can you learn more? Go to http://cara-latonya.info/. Enter B643 in the search box to learn more about \"Priapism (Prolonged Erection): Care Instructions. \"  Current as of: March 14, 2017  Content Version: 11.3  © 9547-9933 [employer]. Care instructions adapted under license by [employer] (which disclaims liability or warranty for this information).  If you have questions about a medical condition or this instruction, always ask your healthcare professional. [employer] disclaims any warranty or liability for your use of this information.

## 2017-07-28 NOTE — PROGRESS NOTES
HPI       Kaylen Arriaga is a 29 y.o. male who presents for liver biopsy results and follow up on priapism, allergies. He had elevated LFTs in April, normal in May, is not obese but did have significant EtOH intake in April. Got biopsy 7/19, result today is normal liver biopsy. He also notes priaprism, went to [facility] ER 7/21 for that and notes it has happened more mildly several times in the past. ER asked him to follow up with his PCP for sickle cell testing. Mother present with him unaware of sickle cell in her side of the family, they do not know the details of his father's side of the family as she has not been with his father for many years. He also endorses some shin pains at times. He notes when he was in [place] he had a severe allergic reaction to peanuts that required a trip to the ED there and is asking for an epipen. PMHx:  Past Medical History:   Diagnosis Date    Anxiety     Bipolar 1 disorder ([facility]     Cirrhosis, alcoholic (HCC)     Depression      Meds:   Current Outpatient Prescriptions   Medication Sig Dispense Refill    EPINEPHrine (EPIPEN) 0.3 mg/0.3 mL injection 0.3 mL by IntraMUSCular route once as needed for up to 1 dose. 1 Syringe 1    ciprofloxacin HCl (CIPRO) 500 mg tablet Take 1 Tab by mouth two (2) times a day for 7 days. 14 Tab 0    ondansetron (ZOFRAN ODT) 4 mg disintegrating tablet DISSOLVE 1 TABLET ON TONGUE EVERY 6 HOURS AS NEEDED FOR NAUSEA. Please follow up in clinic. 10 Tab 0    OLANZapine (ZYPREXA) 20 mg tablet TAKE 1 TABLET BY MOUTH AT BEDTIME  2    spironolactone (ALDACTONE) 100 mg tablet Take 100 mg by mouth daily.  furosemide (LASIX) 20 mg tablet Take 1 Tab by mouth daily. 90 Tab 0    Comp. Stocking,Thigh,Long,Large misc 1 Package by Does Not Apply route daily. 1 Package 0    oxyCODONE IR (ROXICODONE) 5 mg immediate release tablet TAKE 1 TABLET BY MOUTH EVERY 4 HOURS AS NEEDED.  MAX OF 30MG PER DAY , MAX 6 TABS PER DAY  0    promethazine (PHENERGAN) 25 mg tablet Take 1 Tab by mouth every six (6) hours as needed. 12 Tab 0    oxyCODONE (OXYIR) 5 mg capsule Take 1 Cap by mouth every four (4) hours as needed. Max Daily Amount: 30 mg. 10 Cap 0    sertraline (ZOLOFT) 50 mg tablet Take 50 mg by mouth daily. Allergies: Allergies   Allergen Reactions    Peanut Swelling    Tree Nut Swelling       Smoker:  History   Smoking Status    Current Every Day Smoker   Smokeless Tobacco    Never Used     ETOH:   History   Alcohol Use    1.2 oz/week    2 Cans of beer per week     FH:   Family History   Problem Relation Age of Onset    Diabetes Mother     Asthma Mother     Hypertension Mother     Cancer Father      ROS:  Review of Systems   Constitutional: Negative for appetite change, chills, fatigue, fever and unexpected weight change. Respiratory: Negative for cough. Cardiovascular: Negative for chest pain. Gastrointestinal: Negative for abdominal pain. Musculoskeletal: Negative for myalgias. Psychiatric/Behavioral: Negative for agitation. Physical Exam:  Visit Vitals    /76    Pulse 75    Temp 97.8 °F (36.6 °C) (Oral)    Resp 16    Ht 6' 7\" (2.007 m)    Wt 190 lb (86.2 kg)    SpO2 98%    BMI 21.4 kg/m2       Wt Readings from Last 3 Encounters:   07/28/17 190 lb (86.2 kg)   07/21/17 187 lb (84.8 kg)   07/19/17 200 lb (90.7 kg)     BP Readings from Last 3 Encounters:   07/28/17 116/76   07/21/17 121/65   07/19/17 121/82      Physical Exam   Constitutional: He is oriented to person, place, and time. He appears well-developed and well-nourished. HENT:   Head: Normocephalic. Nose: Nose normal.   Eyes: Conjunctivae are normal.   Cardiovascular: Normal rate, regular rhythm and normal heart sounds. Exam reveals no gallop and no friction rub. No murmur heard. Pulmonary/Chest: Effort normal and breath sounds normal. No respiratory distress. He has no wheezes. He has no rales. Abdominal: Soft.  Bowel sounds are normal. He exhibits no mass. There is no tenderness. Musculoskeletal: He exhibits no edema or tenderness. Neurological: He is alert and oriented to person, place, and time. Skin: Skin is warm and dry. He is not diaphoretic. Psychiatric: He has a normal mood and affect. His behavior is normal.   Vitals reviewed. Assessment     29 y.o. male with:    ICD-10-CM ICD-9-CM    1. Priapism N48.30 607.3 HEMOGLOBIN FRACTIONATION   2. Peanut allergy Z91.010 V15.01 EPINEPHrine (EPIPEN) 0.3 mg/0.3 mL injection              Plan       Orders Placed This Encounter    HEMOGLOBIN FRACTIONATION    EPINEPHrine (EPIPEN) 0.3 mg/0.3 mL injection     Priapism: Checking Hgb electrophoresis. Peanut allergy: Ordered epi pen. Liver biopsy: discussed results and advised continued avoidance of EtOH. Pt requests phone call with results. I have discussed the diagnosis with the patient and the intended plan as seen in the above orders. The patient has received an after-visit summary and questions were answered concerning future plans. I have discussed medication side effects and warnings with the patient as well.     Josiah Sultana MD  Family Medicine Resident

## 2017-07-28 NOTE — MR AVS SNAPSHOT
Visit Information Date & Time Provider Department Dept. Phone Encounter #  
 7/28/2017  8:20 AM Aliza Valdivia MD [facility] [phone] [id number] Follow-up Instructions Return in about 3 months (around 10/28/2017) for Follow Up. Your Appointments 11/21/2017 10:00 AM  
ESTABLISHED PATIENT with Susanna Griffin MD  
[facility] ([address]) Appt Note: 6 mo fup  
 [address] [facility] [address]  
[address] [facility] [address] [address] Upcoming Health Maintenance Date Due Pneumococcal 19-64 Medium Risk (1 of 1 - PPSV23) 4/12/2006 DTaP/Tdap/Td series (1 - Tdap) 4/12/2008 INFLUENZA AGE 9 TO ADULT 8/1/2017 Allergies as of 7/28/2017  Review Complete On: 7/28/2017 By: Mathew Torres LPN Severity Noted Reaction Type Reactions Peanut  09/09/2016    Swelling Tree Nut  09/09/2016    Swelling Current Immunizations  Never Reviewed No immunizations on file. Not reviewed this visit You Were Diagnosed With   
  
 Codes Comments Priapism    -  Primary ICD-10-CM: N48.30 ICD-9-CM: 607.3 Peanut allergy     ICD-10-CM: Z91.010 
ICD-9-CM: V15.01 Vitals BP Pulse Temp Resp Height(growth percentile) Weight(growth percentile) 116/76 75 97.8 °F (36.6 °C) (Oral) 16 6' 7\" (2.007 m) 190 lb (86.2 kg) SpO2 BMI Smoking Status 98% 21.4 kg/m2 Current Every Day Smoker Vitals History BMI and BSA Data Body Mass Index Body Surface Area  
 21.4 kg/m 2 2.19 m 2 Preferred Pharmacy Pharmacy Name Phone [facility] [address] [phone] Your Updated Medication List  
  
   
This list is accurate as of: 7/28/17  8:46 AM.  Always use your most recent med list.  
  
  
  
  
 ciprofloxacin HCl 500 mg tablet Commonly known as:  CIPRO Take 1 Tab by mouth two (2) times a day for 7 days. Comp. Stocking,Thigh,Long,Large Misc  
1 Package by Does Not Apply route daily. EPINEPHrine 0.3 mg/0.3 mL injection Commonly known as:  EPIPEN  
0.3 mL by IntraMUSCular route once as needed for up to 1 dose. furosemide 20 mg tablet Commonly known as:  LASIX Take 1 Tab by mouth daily. OLANZapine 20 mg tablet Commonly known as:  ZyPREXA  
TAKE 1 TABLET BY MOUTH AT BEDTIME  
  
 ondansetron 4 mg disintegrating tablet Commonly known as:  ZOFRAN ODT  
DISSOLVE 1 TABLET ON TONGUE EVERY 6 HOURS AS NEEDED FOR NAUSEA. Please follow up in clinic. * oxyCODONE 5 mg capsule Commonly known as:  OXYIR Take 1 Cap by mouth every four (4) hours as needed. Max Daily Amount: 30 mg.  
  
 * oxyCODONE IR 5 mg immediate release tablet Commonly known as:  ROXICODONE  
TAKE 1 TABLET BY MOUTH EVERY 4 HOURS AS NEEDED. MAX OF 30MG PER DAY , MAX 6 TABS PER DAY  
  
 promethazine 25 mg tablet Commonly known as:  PHENERGAN Take 1 Tab by mouth every six (6) hours as needed. spironolactone 100 mg tablet Commonly known as:  ALDACTONE Take 100 mg by mouth daily. ZOLOFT 50 mg tablet Generic drug:  sertraline Take 50 mg by mouth daily. * Notice: This list has 2 medication(s) that are the same as other medications prescribed for you. Read the directions carefully, and ask your doctor or other care provider to review them with you. Prescriptions Sent to Pharmacy Refills EPINEPHrine (EPIPEN) 0.3 mg/0.3 mL injection 1 Si.3 mL by IntraMUSCular route once as needed for up to 1 dose. Class: Normal  
 Pharmacy: [address] Ph #: [phone] Route: IntraMUSCular We Performed the Following HEMOGLOBIN FRACTIONATION [[id number] Custom] Follow-up Instructions Return in about 3 months (around 10/28/2017) for Follow Up. Patient Instructions Priapism (Prolonged Erection): Care Instructions Your Care Instructions Priapism (say \"IYQ-ex-gdr-um\") is an erection that does not go away. This happens when the penis fills with fluid without sexual stimulation. And it does not go away after orgasm. It may last on and off for days or weeks. The cause of priapism is often not known. But it can happen to men who have sickle cell disease or diabetes. Taking or using medicine for erection problems may also cause priapism. Your doctor may have removed blood from your penis to ease the pain. And he or she may have given you a shot of medicine to soften the erection. Follow-up care is a key part of your treatment and safety. Be sure to make and go to all appointments, and call your doctor if you are having problems. It's also a good idea to know your test results and keep a list of the medicines you take. How can you care for yourself at home? · Put ice or a cold pack on the area for 10 to 20 minutes at a time. Put a thin cloth between the ice and your skin. · Urinate often. Don't wait until you feel the need. · Avoid the medicine that may have caused the painful erection. When should you call for help? Call your doctor now or seek immediate medical care if: 
· You have a new painful erection that does not go away. Watch closely for changes in your health, and be sure to contact your doctor if: 
· You do not get better as expected. Where can you learn more? Go to http://cara-latonya.info/. Enter B643 in the search box to learn more about \"Priapism (Prolonged Erection): Care Instructions. \" Current as of: March 14, 2017 Content Version: 11.3 © 5000-4190 [employer].  Care instructions adapted under license by [employer] (which disclaims liability or warranty for this information). If you have questions about a medical condition or this instruction, always ask your healthcare professional. [address] any warranty or liability for your use of this information. Introducing [facility] SERVICES! Jyotsna Webster introduces [employer]Kal patient portal. Now you can access parts of your medical record, email your doctor's office, and request medication refills online. 1. In your internet browser, go to [email or web address] 2. Click on the First Time User? Click Here link in the Sign In box. You will see the New Member Sign Up page. 3. Enter your [employer] Access Code exactly as it appears below. You will not need to use this code after youve completed the sign-up process. If you do not sign up before the expiration date, you must request a new code. · [employer] Access Code: [email or web address] Expires: 10/16/2017  2:17 PM 
 
4. Enter the last four digits of your Social Security Number (xxxx) and Date of Birth (mm/dd/yyyy) as indicated and click Submit. You will be taken to the next sign-up page. 5. Create a ZeaKal ID. This will be your [employer]Kal login ID and cannot be changed, so think of one that is secure and easy to remember. 6. Create a [employer] password. You can change your password at any time. 7. Enter your Password Reset Question and Answer. This can be used at a later time if you forget your password. 8. Enter your e-mail address. You will receive e-mail notification when new information is available in [address]. 9. Click Sign Up. You can now view and download portions of your medical record. 10. Click the Download Summary menu link to download a portable copy of your medical information. If you have questions, please visit the Frequently Asked Questions section of the [employer] website. Remember, [employer] is NOT to be used for urgent needs. For medical emergencies, dial 911. Now available from your iPhone and Android! Please provide this summary of care documentation to your next provider. Your primary care clinician is listed as NOT ON FILE. If you have any questions after today's visit, please call [phone].

## 2017-07-31 ENCOUNTER — TELEPHONE (OUTPATIENT)
Dept: FAMILY MEDICINE CLINIC | Age: 32
End: 2017-07-31

## 2017-08-02 LAB
HGB A MFR BLD: 97.8 % (ref 94–98)
HGB A2 MFR BLD COLUMN CHROM: 1.1 % (ref 0.7–3.1)
HGB C MFR BLD: 0 %
HGB F MFR BLD: 0 % (ref 0–2)
HGB FRACT BLD-IMP: NORMAL
HGB OTHER MFR BLD HPLC: NORMAL %
HGB S BLD QL SOLY: NEGATIVE
HGB S MFR BLD: 0 %

## 2017-08-02 NOTE — TELEPHONE ENCOUNTER
----- Message from Montana Castillo sent at 8/1/2017  5:22 PM EDT -----  Regarding: Dr. Philip Wooten  Pt checking on status of his test results taken on 07/28/17. Pt is very very upset no one is returning his calls to give him his results. Best contact number [phone].

## 2017-08-03 ENCOUNTER — TELEPHONE (OUTPATIENT)
Dept: FAMILY MEDICINE CLINIC | Age: 32
End: 2017-08-03

## 2017-08-03 NOTE — TELEPHONE ENCOUNTER
----- Message from Sue Mckeon sent at 8/3/2017  9:08 AM EDT -----  Regarding: Dr. Yeung Atka Telephone  Patient returning a call, not sure from who.  Contact is [phone]

## 2017-08-25 ENCOUNTER — HOSPITAL ENCOUNTER (OUTPATIENT)
Dept: GENERAL RADIOLOGY | Age: 32
Discharge: HOME | End: 2017-08-25
Attending: INTERNAL MEDICINE
Payer: MEDICAID

## 2017-08-25 DIAGNOSIS — R63.4 LOSS OF WEIGHT: ICD-10-CM

## 2017-08-25 DIAGNOSIS — R10.10 UPPER ABDOMINAL PAIN: ICD-10-CM

## 2017-08-25 PROCEDURE — 74245 XR UPPER GI/SMALL BOWEL: CPT

## 2019-05-07 ENCOUNTER — TELEPHONE (OUTPATIENT)
Dept: FAMILY MEDICINE CLINIC | Age: 34
End: 2019-05-07

## 2019-05-07 NOTE — TELEPHONE ENCOUNTER
Called and spoke with the patient who was informed to schedule an appointment to be seen by a doctor. He said he would decline and ask about it at the scheduled upcoming appointment.

## 2019-05-07 NOTE — TELEPHONE ENCOUNTER
----- Message from Gemma Even sent at 5/7/2019  1:48 PM EDT -----  Regarding: Nathan Roberson, DO / telephone   Pt is requesting a prescription for the dissolvable nausea medication to be sent to [address] [phone]. Pts contact [phone].    Pt does have an appt on 5/15

## 2019-05-15 ENCOUNTER — OFFICE VISIT (OUTPATIENT)
Dept: FAMILY MEDICINE CLINIC | Age: 34
End: 2019-05-15

## 2019-05-15 VITALS
WEIGHT: 202 LBS | HEART RATE: 64 BPM | HEIGHT: 78 IN | DIASTOLIC BLOOD PRESSURE: 90 MMHG | BODY MASS INDEX: 23.37 KG/M2 | OXYGEN SATURATION: 100 % | RESPIRATION RATE: 18 BRPM | SYSTOLIC BLOOD PRESSURE: 138 MMHG | TEMPERATURE: 98.3 F

## 2019-05-15 DIAGNOSIS — R11.0 NAUSEA: Primary | ICD-10-CM

## 2019-05-15 DIAGNOSIS — R79.89 ELEVATED LFTS: ICD-10-CM

## 2019-05-15 RX ORDER — PANTOPRAZOLE SODIUM 40 MG/1
40 TABLET, DELAYED RELEASE ORAL DAILY
Qty: 30 TAB | Refills: 2 | Status: SHIPPED | OUTPATIENT
Start: 2019-05-15

## 2019-05-15 RX ORDER — ONDANSETRON 4 MG/1
TABLET, ORALLY DISINTEGRATING ORAL
Qty: 10 TAB | Refills: 0 | Status: SHIPPED | OUTPATIENT
Start: 2019-05-15 | End: 2019-07-18 | Stop reason: SDUPTHER

## 2019-05-15 NOTE — PROGRESS NOTES
Identified Patient with two Patient identifiers (Name and ). Two Patient Identifiers confirmed. Reviewed record in preparation for visit and have obtained necessary documentation. Chief Complaint   Patient presents with    Nausea     x2 years on and off       Visit Vitals  /90 (BP 1 Location: Right arm, BP Patient Position: Sitting)   Pulse 64   Temp 98.3 °F (36.8 °C) (Oral)   Resp 18   Ht 6' 7\" (2.007 m)   Wt 202 lb (91.6 kg)   SpO2 100%   BMI 22.76 kg/m²       1. Have you been to the ER, urgent care clinic since your last visit? Hospitalized since your last visit? No    2. Have you seen or consulted any other health care providers outside of the [address] [place] [address] since your last visit? Include any pap smears or colon screening.  No

## 2019-05-15 NOTE — PROGRESS NOTES
[address] [facility]    Subjective:     CC: Nausea  History provided by patient    Shanel Guo is a 17[address] [facility] y.o. male with history of chronic nausea, alcohol abuse, elevated LFTs (s/p liver biopsy which was normal in 2017) & hiatal hernia who is coming in today for his nausea. Pt states that he was prescribed zofran by the last provider he saw which helped in controlling his symptoms. He denies any vomiting or stool changes with the nausea which has been present for >2 yrs now. Pt states having reflux intermittently but that it does not seem to be related to when his nausea occurs. Has not had an endoscopy in the past.     Last alcohol drink 6 months ago, has started smoking 8-10 a day. Denies any other drug use. Current Outpatient Medications on File Prior to Visit   Medication Sig Dispense Refill    EPINEPHrine (EPIPEN) 0.3 mg/0.3 mL injection 0.3 mL by IntraMUSCular route once as needed for up to 1 dose. 1 Syringe 1    OLANZapine (ZYPREXA) 20 mg tablet TAKE 1 TABLET BY MOUTH AT BEDTIME  2    ondansetron (ZOFRAN ODT) 4 mg disintegrating tablet DISSOLVE 1 TABLET ON TONGUE EVERY 6 HOURS AS NEEDED FOR NAUSEA. Please follow up in clinic. 10 Tab 0    oxyCODONE IR (ROXICODONE) 5 mg immediate release tablet TAKE 1 TABLET BY MOUTH EVERY 4 HOURS AS NEEDED. MAX OF 30MG PER DAY , MAX 6 TABS PER DAY  0    spironolactone (ALDACTONE) 100 mg tablet Take 100 mg by mouth daily.  promethazine (PHENERGAN) 25 mg tablet Take 1 Tab by mouth every six (6) hours as needed. 12 Tab 0    oxyCODONE (OXYIR) 5 mg capsule Take 1 Cap by mouth every four (4) hours as needed. Max Daily Amount: 30 mg. 10 Cap 0    furosemide (LASIX) 20 mg tablet Take 1 Tab by mouth daily. 90 Tab 0    sertraline (ZOLOFT) 50 mg tablet Take 50 mg by mouth daily.  Comp. Stocking,Thigh,Long,Large misc 1 Package by Does Not Apply route daily. 1 Package 0     No current facility-administered medications on file prior to visit. Patient Active Problem List   Diagnosis Code    Biliary cirrhosis (HCC) K74.5       Social History     Socioeconomic History    Marital status:      Spouse name: Not on file    Number of children: Not on file    Years of education: Not on file    Highest education level: Not on file   Occupational History    Not on file   Social Needs    Financial resource strain: Not on file    Food insecurity:     Worry: Not on file     Inability: Not on file    Transportation needs:     Medical: Not on file     Non-medical: Not on file   Tobacco Use    Smoking status: Current Every Day Smoker     Packs/day: 0.50     Years: 20.00     Pack years: 10.00     Types: Cigarettes    Smokeless tobacco: Never Used   Substance and Sexual Activity    Alcohol use: Not Currently     Comment: patient states it has been 6 months since his last drink    Drug use: Not Currently    Sexual activity: Not Currently   Lifestyle    Physical activity:     Days per week: Not on file     Minutes per session: Not on file    Stress: Not on file   Relationships    Social connections:     Talks on phone: Not on file     Gets together: Not on file     Attends Hinduism service: Not on file     Active member of club or organization: Not on file     Attends meetings of clubs or organizations: Not on file     Relationship status: Not on file    Intimate partner violence:     Fear of current or ex partner: Not on file     Emotionally abused: Not on file     Physically abused: Not on file     Forced sexual activity: Not on file   Other Topics Concern    Not on file   Social History Narrative    Not on file       Review of Systems   Constitutional: Negative for chills, fever, malaise/fatigue and weight loss. HENT: Negative for congestion, sinus pain and sore throat. Respiratory: Negative for cough, hemoptysis, sputum production, shortness of breath and wheezing. Cardiovascular: Negative for chest pain and palpitations. Gastrointestinal: Positive for nausea. Negative for abdominal pain, constipation, diarrhea, heartburn and vomiting. Genitourinary: Negative for dysuria, frequency and urgency. Musculoskeletal: Negative for back pain, myalgias and neck pain. Skin: Negative for itching and rash. Neurological: Negative for dizziness and headaches. Psychiatric/Behavioral: Negative for depression and suicidal ideas. Objective:     Visit Vitals  /90 (BP 1 Location: Right arm, BP Patient Position: Sitting)   Pulse 64   Temp 98.3 °F (36.8 °C) (Oral)   Resp 18   Ht 6' 7\" (2.007 m)   Wt 202 lb (91.6 kg)   SpO2 100%   BMI 22.76 kg/m²          Physical Exam   Constitutional: He is oriented to person, place, and time. He appears well-developed and well-nourished. No distress. HENT:   Head: Normocephalic and atraumatic. Eyes: Conjunctivae are normal.   Neck: Normal range of motion. Neck supple. Cardiovascular: Normal rate, regular rhythm and normal heart sounds. Exam reveals no gallop and no friction rub. No murmur heard. Pulmonary/Chest: Effort normal and breath sounds normal. No respiratory distress. He has no wheezes. He has no rales. Abdominal: Soft. Bowel sounds are normal. He exhibits no distension. There is no tenderness. Musculoskeletal: Normal range of motion. Neurological: He is alert and oriented to person, place, and time. Skin: Skin is warm and dry. Psychiatric: He has a normal mood and affect. Assessment and orders:       Nausea: ddx include GERD vs chronic pancreatitis vs symptoms d/t hiatal hernia  - Us Abdomen to r/o chronic pancreatitis  - Zofran 4 mg q8hr prn  - Check H pylori AB IGG  - Protonix 40 mg once daily    Elevated LFTs in the past with neg liver biopsy, pt has currently been alcohol free for >6 months   - Check CMP to evalaute LFTSs    I have discussed the diagnosis with the patient and the intended plan as seen in the above orders.   Social history, medical history, and labs were reviewed. The patient has received an after-visit summary and questions were answered concerning future plans. I have discussed medication side effects and warnings with the patient as well.     Cisco Lynch DO  Resident [facility]  05/15/19    Case was discussed with Dr. Andrea Escamilla (attending physician)

## 2019-05-16 LAB
ALBUMIN SERPL-MCNC: 4.4 G/DL (ref 3.5–5.5)
ALBUMIN/GLOB SERPL: 1.7 {RATIO} (ref 1.2–2.2)
ALP SERPL-CCNC: 57 IU/L (ref 39–117)
ALT SERPL-CCNC: 9 IU/L (ref 0–44)
AST SERPL-CCNC: 18 IU/L (ref 0–40)
BILIRUB SERPL-MCNC: 0.9 MG/DL (ref 0–1.2)
BUN SERPL-MCNC: 6 MG/DL (ref 6–20)
BUN/CREAT SERPL: 6 (ref 9–20)
CALCIUM SERPL-MCNC: 9.5 MG/DL (ref 8.7–10.2)
CHLORIDE SERPL-SCNC: 102 MMOL/L (ref 96–106)
CO2 SERPL-SCNC: 23 MMOL/L (ref 20–29)
CREAT SERPL-MCNC: 1.01 MG/DL (ref 0.76–1.27)
GLOBULIN SER CALC-MCNC: 2.6 G/DL (ref 1.5–4.5)
GLUCOSE SERPL-MCNC: 74 MG/DL (ref 65–99)
H PYLORI IGG SER IA-ACNC: <0.8 INDEX VALUE (ref 0–0.79)
POTASSIUM SERPL-SCNC: 3.6 MMOL/L (ref 3.5–5.2)
PROT SERPL-MCNC: 7 G/DL (ref 6–8.5)
SODIUM SERPL-SCNC: 141 MMOL/L (ref 134–144)

## 2019-05-20 ENCOUNTER — TELEPHONE (OUTPATIENT)
Dept: FAMILY MEDICINE CLINIC | Age: 34
End: 2019-05-20

## 2019-05-20 NOTE — TELEPHONE ENCOUNTER
----- Message from Susan Moss sent at 5/20/2019 10:31 AM EDT -----  Regarding: Dr. Fields Settler  Patient is trying to get his test results. His number is [phone].

## 2019-05-20 NOTE — PROGRESS NOTES
[address] Medicine Residency Attending Addendum:  Patient encounter was discussed on the day of the encounter with Manuel Anderson, , performing the key elements of the service. I discussed the findings, assessment and plan with the resident and agree with the resident's findings and plan as documented in the resident's note.       Kalyan Kline MD, CAQSM, RMSK

## 2019-05-20 NOTE — TELEPHONE ENCOUNTER
Called and spoke with patient regarding his recent lab results. Read him the letter Dr. James Leija had mailed him. Patient also asked about abd Yomi him it was scheduled for 5/23/19 11:00AM at [facility]. Patient verbalized understanding.

## 2019-05-20 NOTE — TELEPHONE ENCOUNTER
Called and spoke with the patient  He said he had not received the letter in the mail yet. He asked for the results over the phone.

## 2019-05-23 ENCOUNTER — TELEPHONE (OUTPATIENT)
Dept: FAMILY MEDICINE CLINIC | Age: 34
End: 2019-05-23

## 2019-05-23 ENCOUNTER — HOSPITAL ENCOUNTER (OUTPATIENT)
Dept: ULTRASOUND IMAGING | Age: 34
Discharge: HOME | End: 2019-05-23
Attending: STUDENT IN AN ORGANIZED HEALTH CARE EDUCATION/TRAINING PROGRAM
Payer: COMMERCIAL

## 2019-05-23 DIAGNOSIS — R11.0 NAUSEA: ICD-10-CM

## 2019-05-23 PROCEDURE — 76700 US EXAM ABDOM COMPLETE: CPT

## 2019-05-23 NOTE — PROGRESS NOTES
Results noted. No acute findings, however common bile duct and pancreas not well visualized. Pt notified of results by phone.     Sonia Brunner, MD  PGY1 Family Medicine Resident

## 2019-05-23 NOTE — TELEPHONE ENCOUNTER
Called pt to discuss ultrasound results from 5/23/19 which showed no acute process. However, common bile duct and pancreas were not well visualized due to bowel gas/body habitus. Pt instructed to make follow up appointment if symptoms do not improve or worsen. Pt voiced understanding and had no questions or concerns at this time.      Javi Tolbert MD  2:31 PM

## 2019-07-17 NOTE — PROGRESS NOTES
HPI       Chief Complaint: \"lump on leg\"    Phyllis Davila is a 30 y.o. male who presents for lesion on the back of R leg. Skin lesion - present at least 4 months. Sometimes it gets very itchy, sometimes painful to touch, sometimes tender even without touch. Denies hx skin infections. Thinks it has gotten larger in the past 4 months. No drainage. No fevers or chills, no systemic symptoms. Chronic nausea - x 3 years. Requesting refill of zofran - says he was using it every 1-2 days. Ran out about 1.5 mos ago. Smoking marijuana 3 times a week to help. Reports he has quit alcohol after diagnosis of cirrhosis. Had Abd US 5/2019 that was wnl. H. Pylori was normal. Pt reports he saw Dr. Joao Robles about 2 years and had endoscopy but has not followed up. Has lost 9 lbs in 2 mos. Reports generalized abdominal pain 2/2 nausea. No diarrhea or constipation. PMHx:  Past Medical History:   Diagnosis Date    Anxiety     [facility]     Cirrhosis, alcoholic [facility] [address] [facility]     Depression      Meds:   Current Outpatient Medications   Medication Sig Dispense Refill    ondansetron (ZOFRAN ODT) 4 mg disintegrating tablet Take 1 Tab by mouth every eight (8) hours as needed for Nausea. 60 Tab 0    OLANZapine (ZYPREXA) 20 mg tablet TAKE 1 TABLET BY MOUTH AT BEDTIME  2    pantoprazole (PROTONIX) 40 mg tablet Take 1 Tab by mouth daily. 30 Tab 2    EPINEPHrine (EPIPEN) 0.3 mg/0.3 mL injection 0.3 mL by IntraMUSCular route once as needed for up to 1 dose. 1 Syringe 1    sertraline (ZOLOFT) 50 mg tablet Take 50 mg by mouth daily. Allergies: Allergies   Allergen Reactions    Mayonnaise Nausea and Vomiting    Peanut Swelling    Pork/Porcine Containing Products Nausea and Vomiting    Tree Nut Swelling     ROS  Review of Systems   Constitutional: Positive for weight loss. Negative for chills, fever and malaise/fatigue. Respiratory: Negative for shortness of breath and wheezing.     Cardiovascular: Negative for chest pain and palpitations. Gastrointestinal: Positive for abdominal pain and nausea. Negative for constipation and diarrhea. Skin:        Skin lesion R leg   Neurological: Negative for dizziness and headaches. Physical Exam:  Visit Vitals  /83   Pulse 90   Temp 98.5 °F (36.9 °C) (Oral)   Resp 16   Ht 6' 7\" (2.007 m)   Wt 193 lb (87.5 kg)   SpO2 98%   BMI 21.74 kg/m²       Wt Readings from Last 3 Encounters:   07/18/19 193 lb (87.5 kg)   05/15/19 202 lb (91.6 kg)   07/28/17 190 lb (86.2 kg)     BP Readings from Last 3 Encounters:   07/18/19 124/83   05/15/19 138/90   07/28/17 116/76      Physical Exam   Constitutional: He is oriented to person, place, and time. He appears well-developed. Neck: Normal range of motion. Neck supple. No thyromegaly present. Cardiovascular: Normal rate, regular rhythm and normal heart sounds. No murmur heard. Pulmonary/Chest: Effort normal and breath sounds normal. No respiratory distress. He has no wheezes. He has no rales. Abdominal: Soft. Bowel sounds are normal. He exhibits no distension. Mild generalized tenderness to palpation on RUQ, epigastric region, LUQ. No focal areas of tenderness. No rebound or guarding. Neurological: He is alert and oriented to person, place, and time. Skin:   0.5cm raised dark lesion over posteriomedial aspect of R leg. No surrounding erythema or warmth. Assessment     30 y.o. male with:    ICD-10-CM ICD-9-CM    1. Nausea R11.0 787.02 REFERRAL TO GASTROENTEROLOGY      ondansetron (ZOFRAN ODT) 4 mg disintegrating tablet   2. Skin lesion of right leg L98.9 709.9 REFERRAL TO DERMATOLOGY              Plan     1. Nausea  Recommended returning to see Dr. Mark Rivers, pt agreeable. States he had been resistant to this in the past because he doesn't want repeat endoscopy. Stated he needs to discuss with GI and see what is recommended. Refilled zofran. Discussed that continued use of marijuana can worsen symptoms. Discussed ED precautions.   - REFERRAL TO GASTROENTEROLOGY  - ondansetron (ZOFRAN ODT) 4 mg disintegrating tablet; Take 1 Tab by mouth every eight (8) hours as needed for Nausea. Dispense: 60 Tab; Refill: 0    2. Skin lesion of right leg  Concern for possible melanoma or other abnormal lesion, referral placed to dermatologist in [address] [place] [address]. Pt advised that if they do not accept his insurance he should call insurance company, check for who is covered, and call us if referral is needed. Patient expressed understanding and repeated the instructions to confirm.   - REFERRAL TO DERMATOLOGY      Patient discussed with Dr. Lesly Figueroa (Attending physician)    I have discussed the diagnosis with the patient and the intended plan as seen in the above orders. The patient has received an after-visit summary and questions were answered concerning future plans. I have discussed medication side effects and warnings with the patient as well.     Crystal Gann MD  Family Medicine Resident  PGY-3

## 2019-07-18 ENCOUNTER — OFFICE VISIT (OUTPATIENT)
Dept: FAMILY MEDICINE CLINIC | Age: 34
End: 2019-07-18

## 2019-07-18 VITALS
SYSTOLIC BLOOD PRESSURE: 124 MMHG | HEIGHT: 78 IN | TEMPERATURE: 98.5 F | DIASTOLIC BLOOD PRESSURE: 83 MMHG | BODY MASS INDEX: 22.33 KG/M2 | OXYGEN SATURATION: 98 % | RESPIRATION RATE: 16 BRPM | WEIGHT: 193 LBS | HEART RATE: 90 BPM

## 2019-07-18 DIAGNOSIS — R11.0 NAUSEA: Primary | ICD-10-CM

## 2019-07-18 DIAGNOSIS — L98.9 SKIN LESION OF RIGHT LEG: ICD-10-CM

## 2019-07-18 RX ORDER — ONDANSETRON 4 MG/1
4 TABLET, ORALLY DISINTEGRATING ORAL
Qty: 60 TAB | Refills: 0 | Status: SHIPPED | OUTPATIENT
Start: 2019-07-18

## 2019-07-18 NOTE — PATIENT INSTRUCTIONS
It is very important for you to call GI and Dermatology to schedule appointments      Nausea and Vomiting: Care Instructions  Your Care Instructions    When you are nauseated, you may feel weak and sweaty and notice a lot of saliva in your mouth. Nausea often leads to vomiting. Most of the time you do not need to worry about nausea and vomiting, but they can be signs of other illnesses. Two common causes of nausea and vomiting are stomach flu and food poisoning. Nausea and vomiting from viral stomach flu will usually start to improve within 24 hours. Nausea and vomiting from food poisoning may last from 12 to 48 hours. The doctor has checked you carefully, but problems can develop later. If you notice any problems or new symptoms, get medical treatment right away. Follow-up care is a key part of your treatment and safety. Be sure to make and go to all appointments, and call your doctor if you are having problems. It's also a good idea to know your test results and keep a list of the medicines you take. How can you care for yourself at home? · To prevent dehydration, drink plenty of fluids, enough so that your urine is light yellow or clear like water. Choose water and other caffeine-free clear liquids until you feel better. If you have kidney, heart, or liver disease and have to limit fluids, talk with your doctor before you increase the amount of fluids you drink. · Rest in bed until you feel better. · When you are able to eat, try clear soups, mild foods, and liquids until all symptoms are gone for 12 to 48 hours. Other good choices include dry toast, crackers, cooked cereal, and gelatin dessert, such as Jell-O. When should you call for help? Call 911 anytime you think you may need emergency care. For example, call if:    · You passed out (lost consciousness).    Call your doctor now or seek immediate medical care if:    · You have symptoms of dehydration, such as:  ? Dry eyes and a dry mouth.   ? Passing only a little dark urine. ? Feeling thirstier than usual.     · You have new or worsening belly pain.     · You have a new or higher fever.     · You vomit blood or what looks like coffee grounds.    Watch closely for changes in your health, and be sure to contact your doctor if:    · You have ongoing nausea and vomiting.     · Your vomiting is getting worse.     · Your vomiting lasts longer than 2 days.     · You are not getting better as expected. Where can you learn more? Go to http://cara-latonya.info/. Enter 25 [id number] in the search box to learn more about \"Nausea and Vomiting: Care Instructions. \"  Current as of: September 23, 2018  Content Version: 11.9  © 0245-0216 [employer]. Care instructions adapted under license by [employer] (which disclaims liability or warranty for this information). If you have questions about a medical condition or this instruction, always ask your healthcare professional. [address] any warranty or liability for your use of this information.

## 2019-07-18 NOTE — PROGRESS NOTES
Chief Complaint   Patient presents with    Skin Problem     back of right thigh     1. Have you been to the ER, urgent care clinic since your last visit? Hospitalized since your last visit? No    2. Have you seen or consulted any other health care providers outside of the [address] [place] [address] since your last visit? Include any pap smears or colon screening.  No

## 2019-11-04 ENCOUNTER — OP HISTORICAL/CONVERTED ENCOUNTER (OUTPATIENT)
Dept: OTHER | Age: 34
End: 2019-11-04

## 2020-04-20 ENCOUNTER — ED HISTORICAL/CONVERTED ENCOUNTER (OUTPATIENT)
Dept: OTHER | Age: 35
End: 2020-04-20

## 2020-05-27 ENCOUNTER — OP HISTORICAL/CONVERTED ENCOUNTER (OUTPATIENT)
Dept: OTHER | Age: 35
End: 2020-05-27

## 2020-06-17 PROBLEM — F19.10 SUBSTANCE ABUSE (HCC): Status: ACTIVE | Noted: 2020-06-17

## 2020-06-17 PROBLEM — N41.1 CHRONIC PROSTATITIS: Status: ACTIVE | Noted: 2020-06-17

## 2020-06-17 PROBLEM — N48.30 PRIAPISM: Status: ACTIVE | Noted: 2020-06-17

## 2020-06-29 PROBLEM — E03.9 ACQUIRED HYPOTHYROIDISM: Status: ACTIVE | Noted: 2020-06-29

## 2020-06-29 RX ORDER — CHOLECALCIFEROL (VITAMIN D3) 125 MCG
CAPSULE ORAL
COMMUNITY

## 2020-06-29 RX ORDER — PHENOL/SODIUM PHENOLATE
20 AEROSOL, SPRAY (ML) MUCOUS MEMBRANE DAILY
COMMUNITY
End: 2022-06-24

## 2020-09-07 ENCOUNTER — ED HISTORICAL/CONVERTED ENCOUNTER (OUTPATIENT)
Dept: OTHER | Age: 35
End: 2020-09-07

## 2020-09-09 VITALS
DIASTOLIC BLOOD PRESSURE: 83 MMHG | HEART RATE: 76 BPM | OXYGEN SATURATION: 97 % | TEMPERATURE: 98.6 F | SYSTOLIC BLOOD PRESSURE: 122 MMHG | HEIGHT: 78 IN | BODY MASS INDEX: 19.83 KG/M2 | WEIGHT: 171.4 LBS

## 2020-09-09 PROBLEM — K29.70 GASTRITIS: Status: ACTIVE | Noted: 2020-06-24

## 2020-09-09 PROBLEM — K62.89 PERIANAL PAIN: Status: ACTIVE | Noted: 2020-06-11

## 2020-09-09 PROBLEM — K22.70 BARRETT'S ESOPHAGUS: Status: ACTIVE | Noted: 2020-06-11

## 2020-09-09 PROBLEM — R94.6 THYROID FUNCTION TEST ABNORMAL: Status: ACTIVE | Noted: 2020-06-24

## 2020-09-09 PROBLEM — F12.10 CANNABIS ABUSE: Status: ACTIVE | Noted: 2020-06-24

## 2020-09-09 RX ORDER — DOCUSATE SODIUM 100 MG/1
100 CAPSULE, LIQUID FILLED ORAL 2 TIMES DAILY
COMMUNITY

## 2020-09-09 RX ORDER — SIMETHICONE 125 MG
125 CAPSULE ORAL
COMMUNITY

## 2020-09-09 RX ORDER — CIPROFLOXACIN 500 MG/1
TABLET ORAL 2 TIMES DAILY
COMMUNITY

## 2020-09-09 RX ORDER — FAMOTIDINE 20 MG/1
20 TABLET, FILM COATED ORAL 2 TIMES DAILY
COMMUNITY

## 2020-09-09 RX ORDER — RANITIDINE 150 MG/1
150 TABLET, FILM COATED ORAL 2 TIMES DAILY
COMMUNITY

## 2020-09-09 RX ORDER — PAROXETINE 10 MG/1
TABLET, FILM COATED ORAL DAILY
COMMUNITY
End: 2022-06-24

## 2020-09-09 RX ORDER — HYDROCORTISONE 25 MG/G
CREAM TOPICAL 2 TIMES DAILY
COMMUNITY
End: 2022-06-24

## 2020-09-09 RX ORDER — CLINDAMYCIN PHOSPHATE 10 MG/G
GEL TOPICAL 2 TIMES DAILY
COMMUNITY

## 2021-07-07 ENCOUNTER — TRANSCRIBE ORDER (OUTPATIENT)
Dept: SCHEDULING | Age: 36
End: 2021-07-07

## 2021-07-07 DIAGNOSIS — K31.84 GASTROPARESIS: Primary | ICD-10-CM

## 2022-03-18 PROBLEM — R94.6 THYROID FUNCTION TEST ABNORMAL: Status: ACTIVE | Noted: 2020-06-24

## 2022-03-18 PROBLEM — K62.89 PERIANAL PAIN: Status: ACTIVE | Noted: 2020-06-11

## 2022-03-18 PROBLEM — N41.1 CHRONIC PROSTATITIS: Status: ACTIVE | Noted: 2020-06-17

## 2022-03-19 PROBLEM — F19.10 SUBSTANCE ABUSE (HCC): Status: ACTIVE | Noted: 2020-06-17

## 2022-03-19 PROBLEM — N48.30 PRIAPISM: Status: ACTIVE | Noted: 2020-06-17

## 2022-03-19 PROBLEM — K29.70 GASTRITIS: Status: ACTIVE | Noted: 2020-06-24

## 2022-03-19 PROBLEM — K22.70 BARRETT'S ESOPHAGUS: Status: ACTIVE | Noted: 2020-06-11

## 2022-03-19 PROBLEM — K74.5 BILIARY CIRRHOSIS (HCC): Status: ACTIVE | Noted: 2017-05-15

## 2022-03-19 PROBLEM — F12.10 CANNABIS ABUSE: Status: ACTIVE | Noted: 2020-06-24

## 2022-03-20 PROBLEM — E03.9 ACQUIRED HYPOTHYROIDISM: Status: ACTIVE | Noted: 2020-06-29

## 2022-06-20 ENCOUNTER — HOSPITAL ENCOUNTER (INPATIENT)
Age: 37
LOS: 4 days | Discharge: HOME | DRG: 753 | End: 2022-06-24
Attending: EMERGENCY MEDICINE | Admitting: PSYCHIATRY & NEUROLOGY
Payer: MEDICAID

## 2022-06-20 DIAGNOSIS — F99 PSYCHIATRIC DISORDER: ICD-10-CM

## 2022-06-20 DIAGNOSIS — Z00.8 MEDICAL CLEARANCE FOR PSYCHIATRIC ADMISSION: ICD-10-CM

## 2022-06-20 DIAGNOSIS — R46.2 BIZARRE BEHAVIOR: Primary | ICD-10-CM

## 2022-06-20 PROBLEM — Z86.59 HX OF BIPOLAR DISORDER: Status: ACTIVE | Noted: 2022-06-20

## 2022-06-20 PROBLEM — F43.10 PTSD (POST-TRAUMATIC STRESS DISORDER): Status: ACTIVE | Noted: 2022-06-20

## 2022-06-20 LAB
ALBUMIN SERPL-MCNC: 4.1 G/DL (ref 3.5–5)
ALBUMIN/GLOB SERPL: 1 {RATIO} (ref 1.1–2.2)
ALP SERPL-CCNC: 87 U/L (ref 45–117)
ALT SERPL-CCNC: 19 U/L (ref 12–78)
AMPHET UR QL SCN: NEGATIVE
ANION GAP SERPL CALC-SCNC: 7 MMOL/L (ref 5–15)
APAP SERPL-MCNC: <10 UG/ML (ref 10–30)
APPEARANCE UR: CLEAR
AST SERPL W P-5'-P-CCNC: 20 U/L (ref 15–37)
BACTERIA URNS QL MICRO: NEGATIVE /HPF
BARBITURATES UR QL SCN: NEGATIVE
BASOPHILS # BLD: 0.1 K/UL (ref 0–0.1)
BASOPHILS NFR BLD: 1 % (ref 0–1)
BENZODIAZ UR QL: NEGATIVE
BILIRUB SERPL-MCNC: 0.8 MG/DL (ref 0.2–1)
BILIRUB UR QL: NEGATIVE
BUN SERPL-MCNC: 6 MG/DL (ref 6–20)
BUN/CREAT SERPL: 5 (ref 12–20)
CA-I BLD-MCNC: 9 MG/DL (ref 8.5–10.1)
CANNABINOIDS UR QL SCN: POSITIVE
CHLORIDE SERPL-SCNC: 108 MMOL/L (ref 97–108)
CO2 SERPL-SCNC: 25 MMOL/L (ref 21–32)
COCAINE UR QL SCN: NEGATIVE
COLOR UR: ABNORMAL
CREAT SERPL-MCNC: 1.2 MG/DL (ref 0.7–1.3)
DIFFERENTIAL METHOD BLD: NORMAL
DRUG SCRN COMMENT,DRGCM: ABNORMAL
EOSINOPHIL # BLD: 0.2 K/UL (ref 0–0.4)
EOSINOPHIL NFR BLD: 2 % (ref 0–7)
ERYTHROCYTE [DISTWIDTH] IN BLOOD BY AUTOMATED COUNT: 13.2 % (ref 11.5–14.5)
ETHANOL SERPL-MCNC: 162 MG/DL
FLUAV RNA SPEC QL NAA+PROBE: NOT DETECTED
FLUBV RNA SPEC QL NAA+PROBE: NOT DETECTED
GLOBULIN SER CALC-MCNC: 4 G/DL (ref 2–4)
GLUCOSE SERPL-MCNC: 89 MG/DL (ref 65–100)
GLUCOSE UR STRIP.AUTO-MCNC: NEGATIVE MG/DL
HCT VFR BLD AUTO: 41.3 % (ref 36.6–50.3)
HGB BLD-MCNC: 13.3 G/DL (ref 12.1–17)
HGB UR QL STRIP: ABNORMAL
IMM GRANULOCYTES # BLD AUTO: 0 K/UL (ref 0–0.04)
IMM GRANULOCYTES NFR BLD AUTO: 0 % (ref 0–0.5)
KETONES UR QL STRIP.AUTO: NEGATIVE MG/DL
LEUKOCYTE ESTERASE UR QL STRIP.AUTO: NEGATIVE
LIPASE SERPL-CCNC: 70 U/L (ref 73–393)
LYMPHOCYTES # BLD: 2.8 K/UL (ref 0.8–3.5)
LYMPHOCYTES NFR BLD: 36 % (ref 12–49)
MCH RBC QN AUTO: 31.9 PG (ref 26–34)
MCHC RBC AUTO-ENTMCNC: 32.2 G/DL (ref 30–36.5)
MCV RBC AUTO: 99 FL (ref 80–99)
METHADONE UR QL: NEGATIVE
MONOCYTES # BLD: 0.8 K/UL (ref 0–1)
MONOCYTES NFR BLD: 10 % (ref 5–13)
MUCOUS THREADS URNS QL MICRO: ABNORMAL /LPF
NEUTS SEG # BLD: 4 K/UL (ref 1.8–8)
NEUTS SEG NFR BLD: 51 % (ref 32–75)
NITRITE UR QL STRIP.AUTO: NEGATIVE
NRBC # BLD: 0 K/UL (ref 0–0.01)
NRBC BLD-RTO: 0 PER 100 WBC
OPIATES UR QL: NEGATIVE
PCP UR QL: NEGATIVE
PH UR STRIP: 6 [PH] (ref 5–8)
PLATELET # BLD AUTO: 197 K/UL (ref 150–400)
PMV BLD AUTO: 12 FL (ref 8.9–12.9)
POTASSIUM SERPL-SCNC: 3.3 MMOL/L (ref 3.5–5.1)
PROT SERPL-MCNC: 8.1 G/DL (ref 6.4–8.2)
PROT UR STRIP-MCNC: NEGATIVE MG/DL
RBC # BLD AUTO: 4.17 M/UL (ref 4.1–5.7)
RBC #/AREA URNS HPF: ABNORMAL /HPF (ref 0–5)
SALICYLATES SERPL-MCNC: 2.1 MG/DL (ref 2.8–20)
SARS-COV-2, COV2: NOT DETECTED
SODIUM SERPL-SCNC: 140 MMOL/L (ref 136–145)
SP GR UR REFRACTOMETRY: 1.01 (ref 1–1.03)
UROBILINOGEN UR QL STRIP.AUTO: 0.1 EU/DL (ref 0.1–1)
WBC # BLD AUTO: 7.8 K/UL (ref 4.1–11.1)
WBC URNS QL MICRO: ABNORMAL /HPF (ref 0–4)

## 2022-06-20 PROCEDURE — 36415 COLL VENOUS BLD VENIPUNCTURE: CPT

## 2022-06-20 PROCEDURE — 99285 EMERGENCY DEPT VISIT HI MDM: CPT

## 2022-06-20 PROCEDURE — 96372 THER/PROPH/DIAG INJ SC/IM: CPT

## 2022-06-20 PROCEDURE — 83690 ASSAY OF LIPASE: CPT

## 2022-06-20 PROCEDURE — 80307 DRUG TEST PRSMV CHEM ANLYZR: CPT

## 2022-06-20 PROCEDURE — 80143 DRUG ASSAY ACETAMINOPHEN: CPT

## 2022-06-20 PROCEDURE — 65220000003 HC RM SEMIPRIVATE PSYCH

## 2022-06-20 PROCEDURE — 82077 ASSAY SPEC XCP UR&BREATH IA: CPT

## 2022-06-20 PROCEDURE — 80053 COMPREHEN METABOLIC PANEL: CPT

## 2022-06-20 PROCEDURE — 80179 DRUG ASSAY SALICYLATE: CPT

## 2022-06-20 PROCEDURE — 87636 SARSCOV2 & INF A&B AMP PRB: CPT

## 2022-06-20 PROCEDURE — 74011250636 HC RX REV CODE- 250/636: Performed by: NURSE PRACTITIONER

## 2022-06-20 PROCEDURE — 81001 URINALYSIS AUTO W/SCOPE: CPT

## 2022-06-20 PROCEDURE — 85025 COMPLETE CBC W/AUTO DIFF WBC: CPT

## 2022-06-20 RX ORDER — ZIPRASIDONE MESYLATE 20 MG/ML
20 INJECTION, POWDER, LYOPHILIZED, FOR SOLUTION INTRAMUSCULAR
Status: COMPLETED | OUTPATIENT
Start: 2022-06-20 | End: 2022-06-20

## 2022-06-20 RX ADMIN — ZIPRASIDONE MESYLATE 20 MG: 20 INJECTION, POWDER, LYOPHILIZED, FOR SOLUTION INTRAMUSCULAR at 17:07

## 2022-06-20 NOTE — BSMART NOTE
Paperless ECO initiated as police officers had to place pt in [address] [place] [address].   Per Zev Rodriguez she cannot issue a normal ECO is paperless is issued in the meantime

## 2022-06-20 NOTE — ED TRIAGE NOTES
Pt states feeling depressed over the past few days. Pt denies SI/HI and A/VH but is responding to internal stimuli in triage.

## 2022-06-20 NOTE — ED NOTES
Patient out of room trying to leave, patient making threats to staff, not being corporative with staff or police to get back his room.
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
## 2022-06-20 NOTE — BSMART NOTE
Due to patient's behavior in the ER, petition for ECO completed and faxed to the [employer] by this [employer] dispatcher Nadeen Pathak made aware by this writer

## 2022-06-20 NOTE — ED PROVIDER NOTES
EMERGENCY DEPARTMENT HISTORY AND PHYSICAL EXAM      Date: 6/20/2022  Patient Name: Lacy Cobb. History of Presenting Illness     Chief Complaint   Patient presents with    Depression       History Provided By: Patient    HPI: Lacy Lainez, 30 y.o. male with a past medical history significant for GERD, GI disorders, psychiatric disorders presents to the ED with cc of depression. Patient is appearing to respond to internal stimuli and minimally cooperative with appropriate answering of questions and physical exam.  Only reports GI disorders as his medical problems which he is not having any current symptoms at this time. He is followed with GI as outpatient. He is not willing to discuss any potential triggers for his increased depression symptoms and he denies any suicidal homicidal ideations and any hallucinations although he is exhibiting some bizarre behaviors with handwringing and appearing to bond to internal stimuli. Limited HPI given his uncooperative interview. He appears in no acute distress is moving all extremities equally and ambulating with upright steady gait. Behavioral health has been notified and is contacting [place] [phone]. He denies any drug today but does admit to alcohol but will not disclose amount or last drink. There are no other complaints, changes, or physical findings at this time. PCP: Nedra Granado MD    No current facility-administered medications on file prior to encounter. Current Outpatient Medications on File Prior to Encounter   Medication Sig Dispense Refill    ciprofloxacin HCl (CIPRO) 500 mg tablet Take  by mouth two (2) times a day.  docusate sodium (COLACE) 100 mg capsule Take 100 mg by mouth two (2) times a day.  simethicone (Gas Relief Extra Strength) 125 mg capsule Take 125 mg by mouth four (4) times daily as needed for Flatulence.       hydrocortisone (HYTONE) 2.5 % topical cream Apply  to affected area two (2) times a day. use thin layer      PARoxetine (PAXIL) 10 mg tablet Take  by mouth daily.  raNITIdine (ZANTAC) 150 mg tablet Take 150 mg by mouth two (2) times a day.  clindamycin (CLINDAGEL) 1 % topical gel Apply  to affected area two (2) times a day. use thin film on affected area      famotidine (PEPCID) 20 mg tablet Take 20 mg by mouth two (2) times a day.  linaCLOtide (Linzess) 145 mcg cap capsule Take  by mouth Daily (before breakfast).  cholecalciferol, vitamin D3, (Vitamin D3) 50 mcg (2,000 unit) tab Take  by mouth.  Omeprazole delayed release (PRILOSEC D/R) 20 mg tablet Take 20 mg by mouth daily.  ondansetron (ZOFRAN ODT) 4 mg disintegrating tablet Take 1 Tab by mouth every eight (8) hours as needed for Nausea. 60 Tab 0    pantoprazole (PROTONIX) 40 mg tablet Take 1 Tab by mouth daily. 30 Tab 2    EPINEPHrine (EPIPEN) 0.3 mg/0.3 mL injection 0.3 mL by IntraMUSCular route once as needed for up to 1 dose. 1 Syringe 1    OLANZapine (ZYPREXA) 20 mg tablet TAKE 1 TABLET BY MOUTH AT BEDTIME  2    sertraline (ZOLOFT) 50 mg tablet Take 50 mg by mouth daily.          Past History     Past Medical History:  Past Medical History:   Diagnosis Date    Anxiety     Sawyer's esophagus 6/11/2020    [facility]     Cannabis abuse 6/24/2020    Cirrhosis, alcoholic ([facility]     Depression     Gastritis 6/24/2020    GERD (gastroesophageal reflux disease)     Perianal pain 6/11/2020    Thyroid disease     Thyroid function test abnormal 6/24/2020       Past Surgical History:  Past Surgical History:   Procedure Laterality Date    COLONOSCOPY      HX ENDOSCOPY      HX GI      Biopsy of liver       Family History:  Family History   Problem Relation Age of Onset    Diabetes Mother     Asthma Mother     Hypertension Mother     Cancer Father     Cancer Maternal Grandmother        Social History:  Social History     Tobacco Use    Smoking status: Current Every Day Smoker Packs/day: 0.50     Years: 20.00     Pack years: 10.00     Types: Cigarettes    Smokeless tobacco: Never Used   Substance Use Topics    Alcohol use: Not Currently     Comment: patient states it has been 6 months since his last drink    Drug use: Not Currently     Types: Marijuana       Allergies: Allergies   Allergen Reactions    Latex Unknown (comments)    Ibuprofen Not Reported This Time    Mayonnaise Nausea and Vomiting    Milk Containing Products Not Reported This Time    Peanut Swelling    Pork/Porcine Containing Products Nausea and Vomiting    Shellfish Derived Unknown (comments)    Tree Nut Swelling         Review of Systems     Review of Systems   Unable to perform ROS: Psychiatric disorder       Physical Exam     Physical Exam  Vitals and nursing note reviewed. Constitutional:       General: He is not in acute distress. Appearance: Normal appearance. He is not ill-appearing, toxic-appearing or diaphoretic. HENT:      Head: Normocephalic. Ears:      Comments: No obvious abnormality visualized but does not allow direct physical examination  Cardiovascular:      Rate and Rhythm: Normal rate. Comments: No obvious abnormality visualized but does not allow direct physical examination  Pulmonary:      Effort: Pulmonary effort is normal. No respiratory distress. Comments: No obvious abnormality visualized but does not allow direct physical examination  Abdominal:      Tenderness: There is no guarding. Comments: No obvious abnormality visualized but does not allow direct physical examination   Musculoskeletal:         General: No signs of injury. Normal range of motion. Cervical back: Normal range of motion. Comments: No obvious abnormality visualized but does not allow direct physical examination   Skin:     Comments: No obvious abnormality visualized but does not allow direct physical examination   Neurological:      Mental Status: He is alert.       Gait: Gait normal.      Comments: Does not cooperate but is moving all extremities equally, speaking clearly without facial droop   Psychiatric:         Attention and Perception: He is inattentive. Mood and Affect: Affect is labile and inappropriate. Speech: Speech is tangential.         Behavior: Behavior is uncooperative, agitated and aggressive. Judgment: Judgment is impulsive. Lab and Diagnostic Study Results     Labs -     Recent Results (from the past 12 hour(s))   CBC WITH AUTOMATED DIFF    Collection Time: 06/20/22  4:22 PM   Result Value Ref Range    WBC 7.8 4.1 - 11.1 K/uL    RBC 4.17 4.10 - 5.70 M/uL    HGB 13.3 12.1 - 17.0 g/dL    HCT 41.3 36.6 - 50.3 %    MCV 99.0 80.0 - 99.0 FL    MCH 31.9 26.0 - 34.0 PG    MCHC 32.2 30.0 - 36.5 g/dL    RDW 13.2 11.5 - 14.5 %    PLATELET 230 694 - 815 K/uL    MPV 12.0 8.9 - 12.9 FL    NRBC 0.0 0.0  WBC    ABSOLUTE NRBC 0.00 0.00 - 0.01 K/uL    NEUTROPHILS 51 32 - 75 %    LYMPHOCYTES 36 12 - 49 %    MONOCYTES 10 5 - 13 %    EOSINOPHILS 2 0 - 7 %    BASOPHILS 1 0 - 1 %    IMMATURE GRANULOCYTES 0 0 - 0.5 %    ABS. NEUTROPHILS 4.0 1.8 - 8.0 K/UL    ABS. LYMPHOCYTES 2.8 0.8 - 3.5 K/UL    ABS. MONOCYTES 0.8 0.0 - 1.0 K/UL    ABS. EOSINOPHILS 0.2 0.0 - 0.4 K/UL    ABS. BASOPHILS 0.1 0.0 - 0.1 K/UL    ABS. IMM. GRANS. 0.0 0.00 - 0.04 K/UL    DF AUTOMATED     METABOLIC PANEL, COMPREHENSIVE    Collection Time: 06/20/22  4:22 PM   Result Value Ref Range    Sodium 140 136 - 145 mmol/L    Potassium 3.3 (L) 3.5 - 5.1 mmol/L    Chloride 108 97 - 108 mmol/L    CO2 25 21 - 32 mmol/L    Anion gap 7 5 - 15 mmol/L    Glucose 89 65 - 100 mg/dL    BUN 6 6 - 20 mg/dL    Creatinine 1.20 0.70 - 1.30 mg/dL    BUN/Creatinine ratio 5 (L) 12 - 20      GFR est AA >60 >60 ml/min/1.73m2    GFR est non-AA >60 >60 ml/min/1.73m2    Calcium 9.0 8.5 - 10.1 mg/dL    Bilirubin, total 0.8 0.2 - 1.0 mg/dL    AST (SGOT) 20 15 - 37 U/L    ALT (SGPT) 19 12 - 78 U/L    Alk.  phosphatase 87 45 - 117 U/L    Protein, total 8.1 6.4 - 8.2 g/dL    Albumin 4.1 3.5 - 5.0 g/dL    Globulin 4.0 2.0 - 4.0 g/dL    A-G Ratio 1.0 (L) 1.1 - 2.2     ETHYL ALCOHOL    Collection Time: 06/20/22  4:22 PM   Result Value Ref Range    ALCOHOL(ETHYL),SERUM 162 (H) <10 mg/dL   LIPASE    Collection Time: 06/20/22  4:22 PM   Result Value Ref Range    Lipase 70 (L) 73 - 393 U/L   ACETAMINOPHEN    Collection Time: 06/20/22  4:22 PM   Result Value Ref Range    Acetaminophen level <10 (L) 10 - 30 ug/mL   SALICYLATE    Collection Time: 06/20/22  4:22 PM   Result Value Ref Range    Salicylate level 2.1 (L) 2.8 - 20.0 mg/dL   COVID-19 WITH INFLUENZA A/B    Collection Time: 06/20/22  4:22 PM   Result Value Ref Range    SARS-CoV-2 by PCR Not Detected Not Detected      Influenza A by PCR Not Detected Not Detected      Influenza B by PCR Not Detected Not Detected     URINALYSIS W/ RFLX MICROSCOPIC    Collection Time: 06/20/22  9:25 PM   Result Value Ref Range    Color Yellow/Straw      Appearance Clear Clear      Specific gravity 1.009 1.003 - 1.030      pH (UA) 6.0 5.0 - 8.0      Protein Negative Negative mg/dL    Glucose Negative Negative mg/dL    Ketone Negative Negative mg/dL    Bilirubin Negative Negative      Blood Small (A) Negative      Urobilinogen 0.1 0.1 - 1.0 EU/dL    Nitrites Negative Negative      Leukocyte Esterase Negative Negative     URINE MICROSCOPIC    Collection Time: 06/20/22  9:25 PM   Result Value Ref Range    WBC 0-4 0 - 4 /hpf    RBC 0-5 0 - 5 /hpf    Bacteria Negative Negative /hpf    Mucus Trace (A) Negative /lpf   DRUG SCREEN, URINE    Collection Time: 06/20/22  9:45 PM   Result Value Ref Range    AMPHETAMINES Negative Negative      BARBITURATES Negative Negative      BENZODIAZEPINES Negative Negative      COCAINE Negative Negative      METHADONE Negative Negative      OPIATES Negative Negative      PCP(PHENCYCLIDINE) Negative Negative      THC (TH-CANNABINOL) Positive (A) Negative      Drug screen comment This test is a screen for drugs of abuse in a medical setting only (i.e., they are unconfirmed results and as such must not be used for non-medical purposes, e.g.,employment testing, legal testing). Due to its inherent nature, false positive (FP) and false negative (FN) results may be obtained. Therefore, if necessary for medical care, recommend confirmation of positive findings by GC/MS. Radiologic Studies -   @[email or web address]@  CT Results  (Last 48 hours)    None        CXR Results  (Last 48 hours)    None            Medical Decision Making   - I am the first provider for this patient. - I reviewed the vital signs, available nursing notes, past medical history, past surgical history, family history and social history. - Initial assessment performed. The patients presenting problems have been discussed, and they are in agreement with the care plan formulated and outlined with them. I have encouraged them to ask questions as they arise throughout their visit. Vital Signs-Reviewed the patient's vital signs. Patient Vitals for the past 12 hrs:   Temp Pulse Resp BP SpO2   06/20/22 1800 -- (!) 109 17 (!) 168/99 93 %   06/20/22 1607 98.6 °F (37 °C) 69 18 (!) 154/100 100 %       Records Reviewed: Nursing Notes and Old Medical Records    The patient presents with depression and bizarre behaviors with a differential diagnosis of psychosis, delusions, MDD, acute intoxication, metabolic disturbance, encephalopathy      ED Course:     ED Course as of 06/20/22 2239 Mon Jun 20, 2022   [age]-year-old male with known GI and psychiatric history presents with depression. He is not cooperative with answering questions appropriately and is unfocused. Behavioral health to evaluate and he will be worked up for medical clearance for behavioral health admission  [KR]   1656 Patient escalating exhibiting aggressive and threatening behaviors. Behavioral health has petitioned for ECO. Police with patient.  Will medicate with Geodon. [KR]   1723 Labs unremarkable with no evidence of metabolic disturbance, electrolyte derangement other than mild hypokalemia and po repletion ordered. Alcohol 162. COVID and influenza negative. Urine collection still pending but patient is otherwise medically cleared for behavioral health admission. District  evaluating and will likely be under TDO. [KR]   1808 Patient signed out to CHRISTIAN Rasmussen NP to continue care and complete medical clearance determination once urine has been collected. Still under ECO and pending TDO and acceptance. [KR]   1810 Received  from Sapna Etienne NP; patient is under ECO, pending TDO, awaiting UA/UDS. [LP]   1093 Patient is medically cleared [LP]      ED Course User Index  [KR] Mercedes Lujan NP  [LP] Simi Yi NP       Provider Notes (Medical Decision Making):     MDM  Number of Diagnoses or Management Options  Bizarre behavior: new, needed workup  Medical clearance for psychiatric admission: new, needed workup  Psychiatric disorder: new, needed workup     Amount and/or Complexity of Data Reviewed  Clinical lab tests: ordered and reviewed  Review and summarize past medical records: yes  Discuss the patient with other providers: yes         Disposition   Disposition: Condition stable  Admitted to [address] [place] [address] at [facility] the case was discussed with the admitting physician Dr. Saranya Gao    Admitted    Diagnosis     Clinical Impression:   1. Bizarre behavior    2. Psychiatric disorder    3. Medical clearance for psychiatric admission        Attestations:    Anil Longoria NP    Please note that this dictation was completed with [employer], the computer voice recognition software. Quite often unanticipated grammatical, syntax, homophones, and other interpretive errors are inadvertently transcribed by the computer software. Please disregard these errors. Please excuse any errors that have escaped final proofreading. Thank you.

## 2022-06-21 PROCEDURE — 65220000003 HC RM SEMIPRIVATE PSYCH

## 2022-06-21 PROCEDURE — 74011250637 HC RX REV CODE- 250/637: Performed by: PSYCHIATRY & NEUROLOGY

## 2022-06-21 RX ORDER — HYDROXYZINE 50 MG/1
50 TABLET, FILM COATED ORAL
Status: DISCONTINUED | OUTPATIENT
Start: 2022-06-21 | End: 2022-06-24 | Stop reason: HOSPADM

## 2022-06-21 RX ORDER — TRAZODONE HYDROCHLORIDE 50 MG/1
50 TABLET ORAL
Status: DISCONTINUED | OUTPATIENT
Start: 2022-06-21 | End: 2022-06-24 | Stop reason: HOSPADM

## 2022-06-21 RX ORDER — OLANZAPINE 10 MG/1
20 TABLET ORAL
Status: DISCONTINUED | OUTPATIENT
Start: 2022-06-21 | End: 2022-06-24 | Stop reason: HOSPADM

## 2022-06-21 RX ORDER — ACETAMINOPHEN 325 MG/1
650 TABLET ORAL
Status: DISCONTINUED | OUTPATIENT
Start: 2022-06-21 | End: 2022-06-24 | Stop reason: HOSPADM

## 2022-06-21 RX ORDER — OLANZAPINE 5 MG/1
5 TABLET ORAL DAILY
Status: DISCONTINUED | OUTPATIENT
Start: 2022-06-21 | End: 2022-06-24 | Stop reason: HOSPADM

## 2022-06-21 RX ORDER — MAG HYDROX/ALUMINUM HYD/SIMETH 200-200-20
30 SUSPENSION, ORAL (FINAL DOSE FORM) ORAL
Status: DISCONTINUED | OUTPATIENT
Start: 2022-06-21 | End: 2022-06-24 | Stop reason: HOSPADM

## 2022-06-21 RX ORDER — ADHESIVE BANDAGE
30 BANDAGE TOPICAL DAILY PRN
Status: DISCONTINUED | OUTPATIENT
Start: 2022-06-21 | End: 2022-06-24 | Stop reason: HOSPADM

## 2022-06-21 RX ORDER — PANTOPRAZOLE SODIUM 40 MG/1
40 TABLET, DELAYED RELEASE ORAL DAILY
Status: DISCONTINUED | OUTPATIENT
Start: 2022-06-21 | End: 2022-06-24 | Stop reason: HOSPADM

## 2022-06-21 RX ORDER — SERTRALINE HYDROCHLORIDE 50 MG/1
50 TABLET, FILM COATED ORAL DAILY
Status: DISCONTINUED | OUTPATIENT
Start: 2022-06-21 | End: 2022-06-24 | Stop reason: HOSPADM

## 2022-06-21 RX ORDER — ONDANSETRON 4 MG/1
4 TABLET, ORALLY DISINTEGRATING ORAL
Status: DISCONTINUED | OUTPATIENT
Start: 2022-06-21 | End: 2022-06-24 | Stop reason: HOSPADM

## 2022-06-21 RX ORDER — IBUPROFEN 200 MG
1 TABLET ORAL DAILY
Status: DISCONTINUED | OUTPATIENT
Start: 2022-06-21 | End: 2022-06-24 | Stop reason: HOSPADM

## 2022-06-21 RX ADMIN — OLANZAPINE 20 MG: 10 TABLET, FILM COATED ORAL at 21:10

## 2022-06-21 RX ADMIN — SERTRALINE HYDROCHLORIDE 50 MG: 50 TABLET ORAL at 11:51

## 2022-06-21 RX ADMIN — PANTOPRAZOLE SODIUM 40 MG: 40 TABLET, DELAYED RELEASE ORAL at 11:51

## 2022-06-21 RX ADMIN — OLANZAPINE 5 MG: 5 TABLET, FILM COATED ORAL at 11:51

## 2022-06-21 NOTE — ED NOTES
TRANSFER - OUT REPORT:    Verbal report given to Eladio (name) on Meryle Prost.  being transferred to 2S back(unit) for routine progression of care       Report consisted of patients Situation, Background, Assessment and   Recommendations(SBAR). Information from the following report(s) SBAR, Kardex, ED Summary and Med Rec Status was reviewed with the receiving nurse. Lines:       Opportunity for questions and clarification was provided.       Patient transported with:   [employer]

## 2022-06-21 NOTE — CONSULTS
Consult    Patient: Anderson Condon MRN: [id number]  SSN: [id number]    YOB: 1985  Age: 30 y.o. Sex: male      Subjective:      Anderson Condon is a 30 y.o. male with a PMH of GERD, cirrhosis, and Sawyer's esophagus who is being seen for depression. EOC issued during ED course. Medical management for PMH. Initial labs unremarkable. U tox positive for THC. Patient is currently not taking any medications. Patient has been lost to follow-up with gastroenterology and has not been taking any medications. Patient has some burning sensation in his throat but denies any acute abdominal pain, nausea, vomiting, or melena.     Past Medical History:   Diagnosis Date    Anxiety     Sawyer's esophagus 6/11/2020    [facility] ([facility]     Cannabis abuse 6/24/2020    Cirrhosis, alcoholic ([facility]     Depression     Gastritis 6/24/2020    GERD (gastroesophageal reflux disease)     Perianal pain 6/11/2020    Thyroid disease     Thyroid function test abnormal 6/24/2020     Past Surgical History:   Procedure Laterality Date    COLONOSCOPY      HX ENDOSCOPY      HX GI      Biopsy of liver      Family History   Problem Relation Age of Onset    Diabetes Mother     Asthma Mother     Hypertension Mother     Cancer Father     Cancer Maternal Grandmother      Social History     Tobacco Use    Smoking status: Current Every Day Smoker     Packs/day: 0.50     Years: 20.00     Pack years: 10.00     Types: Cigarettes    Smokeless tobacco: Never Used   Substance Use Topics    Alcohol use: Not Currently     Comment: patient states it has been 6 months since his last drink      Current Facility-Administered Medications   Medication Dose Route Frequency Provider Last Rate Last Admin    hydrOXYzine HCL (ATARAX) tablet 50 mg  50 mg Oral TID PRN Ruthie Nguyen MD        traZODone (DESYREL) tablet 50 mg  50 mg Oral QHS PRN Ruthie Nguyen MD        acetaminophen (TYLENOL) tablet 650 mg  650 mg Oral Q4H PRN Ronald Castañeda MD        magnesium hydroxide (MILK OF MAGNESIA) 400 mg/5 mL oral suspension 30 mL  30 mL Oral DAILY PRN Ronald Castañeda MD        nicotine (NICODERM CQ) 14 mg/24 hr patch 1 Patch  1 Patch TransDERmal DAILY Sunshine Macias MD   1 Patch at 06/21/22 0851    alum-mag hydroxide-simeth (MYLANTA) oral suspension 30 mL  30 mL Oral Q4H PRN Noel Macias MD        OLANZapine (ZyPREXA) tablet 20 mg  20 mg Oral QHS Noel Macias MD        sertraline (ZOLOFT) tablet 50 mg  50 mg Oral DAILY Sunshine Macias MD        pantoprazole (PROTONIX) tablet 40 mg  40 mg Oral DAILY Sunshine Macias MD        ondansetron (ZOFRAN ODT) tablet 4 mg  4 mg Oral Q6H PRN Sunshine Macias MD        OLANZapine (ZyPREXA) tablet 5 mg  5 mg Oral DAILY Sunshine Macias MD            Allergies   Allergen Reactions    Latex Unknown (comments)    Ibuprofen Not Reported This Time    Mayonnaise Nausea and Vomiting    Milk Containing Products Not Reported This Time    Peanut Swelling    Pork/Porcine Containing Products Nausea and Vomiting    Shellfish Derived Unknown (comments)    Tree Nut Swelling       Review of Systems:  Review of Systems   Constitutional: Negative. Respiratory: Negative. Cardiovascular: Negative. Gastrointestinal: Negative. All other systems reviewed and are negative. Objective:     Recent Results (from the past 24 hour(s))   CBC WITH AUTOMATED DIFF    Collection Time: 06/20/22  4:22 PM   Result Value Ref Range    WBC 7.8 4.1 - 11.1 K/uL    RBC 4.17 4.10 - 5.70 M/uL    HGB 13.3 12.1 - 17.0 g/dL    HCT 41.3 36.6 - 50.3 %    MCV 99.0 80.0 - 99.0 FL    MCH 31.9 26.0 - 34.0 PG    MCHC 32.2 30.0 - 36.5 g/dL    RDW 13.2 11.5 - 14.5 %    PLATELET 182 859 - 657 K/uL    MPV 12.0 8.9 - 12.9 FL    NRBC 0.0 0.0  WBC    ABSOLUTE NRBC 0.00 0.00 - 0.01 K/uL    NEUTROPHILS 51 32 - 75 %    LYMPHOCYTES 36 12 - 49 %    MONOCYTES 10 5 - 13 %    EOSINOPHILS 2 0 - 7 %    BASOPHILS 1 0 - 1 %    IMMATURE GRANULOCYTES 0 0 - 0.5 %    ABS. NEUTROPHILS 4.0 1.8 - 8.0 K/UL    ABS. LYMPHOCYTES 2.8 0.8 - 3.5 K/UL    ABS. MONOCYTES 0.8 0.0 - 1.0 K/UL    ABS. EOSINOPHILS 0.2 0.0 - 0.4 K/UL    ABS. BASOPHILS 0.1 0.0 - 0.1 K/UL    ABS. IMM. GRANS. 0.0 0.00 - 0.04 K/UL    DF AUTOMATED     METABOLIC PANEL, COMPREHENSIVE    Collection Time: 06/20/22  4:22 PM   Result Value Ref Range    Sodium 140 136 - 145 mmol/L    Potassium 3.3 (L) 3.5 - 5.1 mmol/L    Chloride 108 97 - 108 mmol/L    CO2 25 21 - 32 mmol/L    Anion gap 7 5 - 15 mmol/L    Glucose 89 65 - 100 mg/dL    BUN 6 6 - 20 mg/dL    Creatinine 1.20 0.70 - 1.30 mg/dL    BUN/Creatinine ratio 5 (L) 12 - 20      GFR est AA >60 >60 ml/min/1.73m2    GFR est non-AA >60 >60 ml/min/1.73m2    Calcium 9.0 8.5 - 10.1 mg/dL    Bilirubin, total 0.8 0.2 - 1.0 mg/dL    AST (SGOT) 20 15 - 37 U/L    ALT (SGPT) 19 12 - 78 U/L    Alk.  phosphatase 87 45 - 117 U/L    Protein, total 8.1 6.4 - 8.2 g/dL    Albumin 4.1 3.5 - 5.0 g/dL    Globulin 4.0 2.0 - 4.0 g/dL    A-G Ratio 1.0 (L) 1.1 - 2.2     ETHYL ALCOHOL    Collection Time: 06/20/22  4:22 PM   Result Value Ref Range    ALCOHOL(ETHYL),SERUM 162 (H) <10 mg/dL   LIPASE    Collection Time: 06/20/22  4:22 PM   Result Value Ref Range    Lipase 70 (L) 73 - 393 U/L   ACETAMINOPHEN    Collection Time: 06/20/22  4:22 PM   Result Value Ref Range    Acetaminophen level <10 (L) 10 - 30 ug/mL   SALICYLATE    Collection Time: 06/20/22  4:22 PM   Result Value Ref Range    Salicylate level 2.1 (L) 2.8 - 20.0 mg/dL   COVID-19 WITH INFLUENZA A/B    Collection Time: 06/20/22  4:22 PM   Result Value Ref Range    SARS-CoV-2 by PCR Not Detected Not Detected      Influenza A by PCR Not Detected Not Detected      Influenza B by PCR Not Detected Not Detected     URINALYSIS W/ RFLX MICROSCOPIC    Collection Time: 06/20/22  9:25 PM   Result Value Ref Range    Color Yellow/Straw      Appearance Clear Clear      Specific gravity 1.009 1.003 - 1.030      pH (UA) 6.0 5.0 - 8.0      Protein Negative Negative mg/dL    Glucose Negative Negative mg/dL    Ketone Negative Negative mg/dL    Bilirubin Negative Negative      Blood Small (A) Negative      Urobilinogen 0.1 0.1 - 1.0 EU/dL    Nitrites Negative Negative      Leukocyte Esterase Negative Negative     URINE MICROSCOPIC    Collection Time: 06/20/22  9:25 PM   Result Value Ref Range    WBC 0-4 0 - 4 /hpf    RBC 0-5 0 - 5 /hpf    Bacteria Negative Negative /hpf    Mucus Trace (A) Negative /lpf   DRUG SCREEN, URINE    Collection Time: 06/20/22  9:45 PM   Result Value Ref Range    AMPHETAMINES Negative Negative      BARBITURATES Negative Negative      BENZODIAZEPINES Negative Negative      COCAINE Negative Negative      METHADONE Negative Negative      OPIATES Negative Negative      PCP(PHENCYCLIDINE) Negative Negative      THC (TH-CANNABINOL) Positive (A) Negative      Drug screen comment        This test is a screen for drugs of abuse in a medical setting only (i.e., they are unconfirmed results and as such must not be used for non-medical purposes, e.g.,employment testing, legal testing). Due to its inherent nature, false positive (FP) and false negative (FN) results may be obtained. Therefore, if necessary for medical care, recommend confirmation of positive findings by GC/MS. No orders to display        Vitals:    06/21/22 0056 06/21/22 0422 06/21/22 0806 06/21/22 0936   BP: (!) 148/93 (!) 148/93 (!) 140/85 (!) 140/85   Pulse: 66 66 62 62   Resp:  16 18 18   Temp:  98.6 °F (37 °C) 98 °F (36.7 °C) 98 °F (36.7 °C)   SpO2:       Weight:       Height:            Physical Exam:  Physical Exam  Vitals and nursing note reviewed. HENT:      Head: Normocephalic and atraumatic. Mouth/Throat:      Mouth: Mucous membranes are moist.   Cardiovascular:      Rate and Rhythm: Normal rate and regular rhythm. Pulmonary:      Effort: No respiratory distress. Breath sounds: No wheezing. Abdominal:      General: Bowel sounds are normal. There is no distension. Palpations: Abdomen is soft. Tenderness: There is no abdominal tenderness. Musculoskeletal:      Right lower leg: No edema. Left lower leg: No edema. Skin:     General: Skin is warm. Neurological:      Mental Status: He is alert and oriented to person, place, and time. Comments: Craniel Nerves Assessment:  CN II -Optic Can see finger  CN III- Occulomotor: EOM intact, Pupils react to light  CN IV- Trochlear: EOM intact, Pupils react to light  CN V- Trigeminal: Facial sensation present  CN VI- Abducens: EOM intact, Pupils react to light  CN VII- Facial: Upper: Wrinkle in Forehead, Lower: Smile symmetrical  CN VIII-Auditory: Hears fingers rubbing together  CN IX- Glossopharyngeal: Uvula raises symmetrically  CN X: Vagus- Uvula raises symmetrically  CN XI: Accessory- Shrugs shoulders symmetrically  CN XII: Hypoglossal-Can stick tongue out          Assessment:     Hospital Problems  Date Reviewed: 6/29/2020          Codes Class Noted POA    PTSD (post-traumatic stress disorder) ICD-10-CM: F43.10  ICD-9-CM: 309.81  6/20/2022 Unknown        Hx of bipolar disorder ICD-10-CM: Z86.59  ICD-9-CM: V11.1  6/20/2022 Unknown              Plan:     Alcoholic cirrhosis  LFTs within normal limits    GERD  Continue on Protonix  Will refer to outpatient GI    PTSD  History of bipolar disorder  Continue on Zoloft and Zyprexa  Management per psychiatry    Thank you for your consultation and please do not hesitate to reach out.   Total consult care time: 35 minutes    Signed By: JACK Manzo     June 21, 2022

## 2022-06-21 NOTE — PROGRESS NOTES
Problem: Falls - Risk of  Goal: *Absence of Falls  Description: Document Duke Novak Fall Risk and appropriate interventions in the flowsheet.   Outcome: Progressing Towards Goal  Note: Fall Risk Interventions:       Mentation Interventions: Room close to nurse's station    Medication Interventions: Patient to call before getting OOB,Teach patient to arise slowly,Evaluate medications/consider consulting pharmacy,Utilize gait belt for transfers/ambulation         History of Falls Interventions: Room close to nurse's station,Investigate reason for fall         Problem: Suicide  Goal: *STG: Remains safe in hospital  Outcome: Progressing Towards Goal

## 2022-06-21 NOTE — GROUP NOTE
GHULAM  GROUP DOCUMENTATION INDIVIDUAL                                                                          Group Therapy Note    Date: 6/21/2022    Group Start Time: 0901  Group End Time: 8375  Group Topic: Community Meeting    [facility] [address] [facility] GROUP DOCUMENTATION GROUP    Group Therapy Note    Attendees:         Attendance: Attended    Patient's Goal: Patient stated mood is anxious, goal is to talk to doctor and get on medications. Interventions/techniques: Supported    Follows Directions: Followed directions    Interactions: Interacted appropriately    Mental Status: Calm    Behavior/appearance: Attentive    Goals Achieved: Able to engage in interactions      Additional Notes: Patient stated depression 0, anxiety 10, suicidal 0, homicidal 0.     Corry Franny

## 2022-06-21 NOTE — GROUP NOTE
GHULAM  GROUP DOCUMENTATION INDIVIDUAL                                                                          Group Therapy Note    Date: 6/21/2022    Group Start Time: 0115  Group End Time: 1550  Group Topic: AA/NA    [facility]    Megan Hansen    IP [address] [facility] GROUP DOCUMENTATION GROUP    Group Therapy Note    Attendees: 3/6    AA/NA: pts were asked how they are doing as a check in question. Pts were then encouraged to discuss substances, why they use and how to use healthier coping skills. Due to the high acuity within  the group, writer ended group early. Pts were given handouts on guilt and shame regarding substance use. Attendance: Attended    Patient's Goal:  Attend activities and groups     Interventions/techniques: Validated, Promoted peer support, Provide feedback and Supported    Follows Directions: Followed directions    Interactions: Interacted appropriately    Mental Status: Calm, Congruent and Flat    Behavior/appearance: Attentive, Motivated, Neatly groomed and Withdrawn/quiet    Goals Achieved: Able to engage in interactions, Able to listen to others and Able to manage/cope with feelings      Additional Notes:  Pt spoke about how he has been hospitalized before due to mental health.  Pt is making progress towards goals  By attending and participating in group     [employer]
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
## 2022-06-21 NOTE — GROUP NOTE
IP  GROUP DOCUMENTATION INDIVIDUAL                                                                          Group Therapy Note    Date: 6/21/2022    Group Start Time: 6826  Group End Time: 1400  Group Topic: Process Group - Inpatient    [facility]    Darryle Hoose    IP [address] [facility] GROUP DOCUMENTATION GROUP    Group Therapy Note: Facilitator encouraged the group to discuss thoughts and feelings about events leading up to hospitalization. Attendees: 9         Attendance: Attended    Patient's Goal:  To attend groups    Interventions/techniques: Informed, Validated and Supported    Follows Directions: Followed directions    Interactions: Interacted appropriately    Mental Status: Calm and Congruent    Behavior/appearance: Attentive, Cooperative and Disheveled    Goals Achieved: Able to engage in interactions, Able to listen to others, Able to give feedback to another, Able to reflect/comment on own behavior and Able to self-disclose      Additional Notes:  Pt sat with shirt over his head but once other group members began to share feelings, pt opened up and shared what lead to his PTSD.      Torres Arellano

## 2022-06-21 NOTE — BH NOTES
Pt. Encouraged to attend group but did not attend.   Psycho-ED/Dealing with stress and introduced mindfulness and meditation as a positive way to cope and manage stress

## 2022-06-21 NOTE — PROGRESS NOTES
Problem: Suicide  Goal: *STG: Seeks staff when feelings of self harm or harm towards others arise  Note: Affect restricted. Denied having thoughts to self harm. Up/active, on the unit. Miminml interactions. Pt is here under a TDO, and is scheduled to have his commitment hearing today. Problem: Suicide  Goal: *STG/LTG: Complies with medication therapy    Note: Pt was not on 0900 scheduled meds. Accepted nicotine patch. Problem: Suicide  Goal: *STG: Attends activities and groups    Note: Encouraged to attend groups. Problem: Psychosis  Goal: *STG: Remains safe in hospital    Note: Q 15 mins checks maintained, for safety. Rated his depression and anxiety levels 5/10.

## 2022-06-21 NOTE — GROUP NOTE
IP  GROUP DOCUMENTATION INDIVIDUAL                                                                          Group Therapy Note    Date: 6/21/2022    Group Start Time: 1520  Group End Time: 1992  Group Topic: Recreational/Music Therapy    [facility] 2  NON ACUTE    Efra Fajardo    IP [address] [facility] GROUP DOCUMENTATION GROUP    Group Therapy Note    Facilitated leisure skills group to reinforce positive coping and to manage mood through music, social interaction, group activities and art task    Attendees: 8/12         Attendance: Attended    Patient's Goal:  Attend group daily     Interventions/techniques: Art integration and Supported    Follows Directions: Followed directions    Interactions: Interacted appropriately    Mental Status: Calm    Behavior/appearance: Cooperative    Goals Achieved: Able to engage in interactions and Able to listen to others      Additional Notes:  Receptive to listening to music and a song he selected. Interacted with peers and staff.  Accepted a journal and leisure packet    Augustine Currie

## 2022-06-22 PROCEDURE — 74011250637 HC RX REV CODE- 250/637: Performed by: PSYCHIATRY & NEUROLOGY

## 2022-06-22 PROCEDURE — 65220000003 HC RM SEMIPRIVATE PSYCH

## 2022-06-22 RX ADMIN — SERTRALINE HYDROCHLORIDE 50 MG: 50 TABLET ORAL at 08:17

## 2022-06-22 RX ADMIN — PANTOPRAZOLE SODIUM 40 MG: 40 TABLET, DELAYED RELEASE ORAL at 08:17

## 2022-06-22 RX ADMIN — HYDROXYZINE HYDROCHLORIDE 50 MG: 50 TABLET, FILM COATED ORAL at 21:32

## 2022-06-22 RX ADMIN — OLANZAPINE 5 MG: 5 TABLET, FILM COATED ORAL at 08:17

## 2022-06-22 RX ADMIN — OLANZAPINE 20 MG: 10 TABLET, FILM COATED ORAL at 21:32

## 2022-06-22 NOTE — BH NOTES
Client is pleasant and cooperative. Alert and oriented x 3. Appearance is neat and clean. He has a good appetite and reports sleeping well. Denies feeling suicidal or homicidal.Quietly active on the unit. Denies feeling suicidal or homicidal.Remains on close observation for safety.

## 2022-06-22 NOTE — GROUP NOTE
GHULAM  GROUP DOCUMENTATION INDIVIDUAL                                                                          Group Therapy Note    Date: 6/22/2022    Group Start Time: 1436  Group End Time: 9961  Group Topic: Process Group - Inpatient    [facility]    Blane Naylor    IP [address] [facility] GROUP DOCUMENTATION GROUP    Group Therapy Note  Group members were asked to talk about positive and negative communication techniques and how they can affect the conversation. Pts were challenged and asked to give examples of how to decrease the tension in an argument with communication. Attendees: 3/6         Attendance: Attended    Patient's Goal:  To attend group and participate in activities     Interventions/techniques: Challenged, Informed and Provide feedback    Follows Directions: Followed directions    Interactions: Interacted appropriately    Mental Status: Calm    Behavior/appearance: Attentive, Cooperative and Neatly groomed    Goals Achieved: Able to engage in interactions, Able to listen to others, Able to give feedback to another, Able to manage/cope with feelings, Able to receive feedback, Identified feelings, Identified triggers and Increased hopefulness      Additional Notes:  Pt was able to provide a positive way to talk with an aggressive individual.  Pt shared \"I would ask them how they are and if they needed to talk about something. \"    Keisha Aver

## 2022-06-22 NOTE — PROGRESS NOTES
Problem: Falls - Risk of  Goal: *Absence of Falls  Description: Document Betsy Mandes Fall Risk and appropriate interventions in the flowsheet.   Outcome: Progressing Towards Goal  Note: Fall Risk Interventions:       Mentation Interventions: Room close to nurse's station    Medication Interventions: Evaluate medications/consider consulting pharmacy         History of Falls Interventions: Room close to nurse's station,Investigate reason for fall         Problem: Suicide  Goal: *STG: Seeks staff when feelings of self harm or harm towards others arise  Outcome: Not Progressing Towards Goal  Goal: *STG: Attends activities and groups  Outcome: Progressing Towards Goal

## 2022-06-22 NOTE — GROUP NOTE
GHULAM  GROUP DOCUMENTATION INDIVIDUAL                                                                          Group Therapy Note    Date: 6/22/2022    Group Start Time: 1701  Group End Time: 9923  Group Topic: Education Group - Inpatient    [facility] [address] GROUP DOCUMENTATION GROUP    Group Therapy Note    Attendees: 4/6    Writer facilitated a safety planning group. Writer processed with patients regarding the importance of safety planning and encouraged them to discuss warning signs, coping skills, and ways to keep environment safe. Writer encouraged patients to identify supports and to ask questions if needed. Attendance: Did not attend        Additional Notes:  Pt was in the process of being admitted on the unit so was unable to attend.      Afsaneh Herring
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
## 2022-06-22 NOTE — BH NOTES
7P-7A Shift note: Pt presents calm and cooperative. Denies SI, HI, and AVH. Compliant with groups, and medications. Visible interacting with peers and staff appropriately and denies somatic concerns. Resting in bed with eyes closed, no distress noted. Will continue to monitor.

## 2022-06-22 NOTE — BH NOTES
TDO Disposition    Pt was seen by Alyson Montaño and represented by Mr Sampson.  Pt volunteered for 5 days

## 2022-06-23 PROCEDURE — 74011250637 HC RX REV CODE- 250/637: Performed by: PSYCHIATRY & NEUROLOGY

## 2022-06-23 PROCEDURE — 65220000003 HC RM SEMIPRIVATE PSYCH

## 2022-06-23 RX ORDER — OLANZAPINE 5 MG/1
5 TABLET ORAL DAILY
Qty: 30 TABLET | Refills: 1 | Status: SHIPPED | OUTPATIENT
Start: 2022-06-24

## 2022-06-23 RX ORDER — SERTRALINE HYDROCHLORIDE 50 MG/1
50 TABLET, FILM COATED ORAL DAILY
Qty: 30 TABLET | Refills: 1 | Status: SHIPPED | OUTPATIENT
Start: 2022-06-23

## 2022-06-23 RX ORDER — OLANZAPINE 20 MG/1
20 TABLET ORAL
Qty: 30 TABLET | Refills: 1 | Status: SHIPPED | OUTPATIENT
Start: 2022-06-23

## 2022-06-23 RX ADMIN — OLANZAPINE 5 MG: 5 TABLET, FILM COATED ORAL at 08:40

## 2022-06-23 RX ADMIN — PANTOPRAZOLE SODIUM 40 MG: 40 TABLET, DELAYED RELEASE ORAL at 08:40

## 2022-06-23 RX ADMIN — OLANZAPINE 20 MG: 10 TABLET, FILM COATED ORAL at 21:36

## 2022-06-23 RX ADMIN — SERTRALINE HYDROCHLORIDE 50 MG: 50 TABLET ORAL at 08:40

## 2022-06-23 RX ADMIN — TRAZODONE HYDROCHLORIDE 50 MG: 50 TABLET ORAL at 21:37

## 2022-06-23 NOTE — GROUP NOTE
[facility] GROUP DOCUMENTATION INDIVIDUAL                                                                          Group Therapy Note    Date: 6/23/2022    Group Start Time: 7277  Group End Time: 1100  Group Topic: Relapse Prevention/Role Play Group    [facility] C[facility] MANAG[facility]    Rufina Lyons BSW    [facility] GROUP DOCUMENTATION GROUP    Group Therapy Note      The writer educated the group on substance abuse and discussed stereotypes. Attendees: 4/8         Attendance: Attended    Patient's Goal:  Attend Group     Interventions/techniques: Promoted peer support and Provide feedback    Follows Directions: Followed directions    Interactions: Interacted appropriately    Mental Status: Calm and Happy    Behavior/appearance: Attentive, Caretaking, Cooperative, Disheveled, Enthusiastic and Motivated    Goals Achieved: Able to engage in interactions, Able to listen to others, Able to give feedback to another, Able to reflect/comment on own behavior, Able to receive feedback and Able to self-disclose      Additional Notes: The patient discussed how he has support from family when it comes to substance use and feels that he is understood, but mentioned that he doesn't have plans of quitting at the moment. He did show good insight into his use though and gave good feedback to peers.        SHAN Cloud

## 2022-06-23 NOTE — GROUP NOTE
[facility] GROUP DOCUMENTATION INDIVIDUAL                                                                          Group Therapy Note    Date: 6/23/2022    Group Start Time: 1320  Group End Time: 3098  Group Topic: Education Group - Inpatient    [facility] 2  NON ACUTE    Heather Gao    [address] [facility] GROUP DOCUMENTATION GROUP    Group Therapy Note    Facilitated discussion focused on being aware of different feelings and their triggers and changes that need to be made to experience more comfortable feelings and less uncomfortable feelings    Attendees: 4/7         Attendance: Attended    Patient's Goal:  Attend group daily     Interventions/techniques: Informed and Supported    Follows Directions: Followed directions    Interactions: Interacted appropriately    Mental Status: Calm    Behavior/appearance: Cooperative    Goals Achieved: Able to engage in interactions and Able to listen to others      Additional Notes:  Receptive to information and engaged. Pt shared feeling\"depressed\" prior to admission because \"he was thinking about the trauma that happen to him\" and shared currently feeling \"good because he has been going to groups and has been able to talk to someone\".   Pt shared in order to feel more comfortable feelings he need to  \"keep himself happy by making sure he laugh and smile everyday, take a walk and have someone to talk to\"     Bronson Greer

## 2022-06-23 NOTE — BH NOTES
Nurse Note:    Patient observed in the dayroom/hallway; presents as cooperative and pleasant. Reports anxious mood 3/10 and denies depression. No report of SI, HI, A/V hallucinations currently. Patient is medication compliant; received Atarax for anxiety. Medication effective; no further report of anxiety at this time. Patient reports eating meals and adequate sleep. Will continue to monitor for safety.

## 2022-06-23 NOTE — GROUP NOTE
[facility]  GROUP DOCUMENTATION INDIVIDUAL                                                                          Group Therapy Note    Date: 6/23/2022    Group Start Time: 1115  Group End Time: 1200  Group Topic: Process Group - Inpatient    [facility] ACUTE    Megan Hansen    IP [address] [facility] GROUP DOCUMENTATION GROUP    Group Therapy Note    Attendees: 4/7    Process: pts were asked how they are doing as a check in question. Pts then discussed trauma, how it manifests in our behaviours and how to appropriately treat/process it. Pts were encouraged to provide positive feedback to one another. Pts were then asked to reflect on group and share something they are proud of themselves for. Attendance: Attended    Patient's Goal:  Attend activities and groups     Interventions/techniques: Validated, Promoted peer support, Provide feedback and Supported    Follows Directions: Followed directions    Interactions: Interacted appropriately    Mental Status: Calm, Congruent and Happy    Behavior/appearance: Attentive, Motivated and Neatly groomed    Goals Achieved: Able to engage in interactions, Able to listen to others, Able to reflect/comment on own behavior and Able to manage/cope with feelings      Additional Notes:  Pt spoke about how therapy has been really important to him and said that he is most proud of his kids.  Pt is making progress towards goals by attending and participating in group     [facility]

## 2022-06-23 NOTE — BH NOTES
Patient pleasant, cooperative, and medication compliant. Affect broad with good eye contact. Patient denied SI, HI, AH, VH, depression and anxiety. Patient remains on close observation, Q 15 minute checks.

## 2022-06-23 NOTE — PROGRESS NOTES
Problem: Falls - Risk of  Goal: *Absence of Falls  Description: Document Lorie Calvo Fall Risk and appropriate interventions in the flowsheet.   Outcome: Progressing Towards Goal  Note: Fall Risk Interventions:       Mentation Interventions: Room close to nurse's station    Medication Interventions: Evaluate medications/consider consulting pharmacy         History of Falls Interventions: Room close to nurse's station,Investigate reason for fall         Problem: Suicide  Goal: *STG/LTG: Complies with medication therapy  Outcome: Progressing Towards Goal

## 2022-06-23 NOTE — GROUP NOTE
[facility]  GROUP DOCUMENTATION INDIVIDUAL                                                                          Group Therapy Note    Date: 6/23/2022    Group Start Time: 0915  Group End Time: 0935  Group Topic: Community Meeting    [facility]    Isabella Martínez    IP [address] [facility] GROUP DOCUMENTATION GROUP    Group Therapy Note    Attendees: 4         Attendance: Attended    Patient's Goal:  Talk to  and see what is next    Interventions/techniques: Supported    Follows Directions:  Followed directions    Interactions: Interacted appropriately    Mental Status: Calm    Behavior/appearance: Cooperative    Goals Achieved: Able to engage in interactions      Additional Notes:      Jennifer Lopez

## 2022-06-23 NOTE — PROGRESS NOTES
Comprehensive Nutrition Assessment    Type and Reason for Visit: Initial,Positive nutrition screen (MST 3)    Nutrition Recommendations/Plan:   1. Continue current diet, double portions     Malnutrition Assessment:  Malnutrition Status:  No malnutrition (06/23/22 1253)      Nutrition Assessment:    Admitted for Bipolar, PTSD. Pt endorses good appetites and intakes pta, denies recent wt loss. Stated he had a poor appetite on inpatient day 1, has now improved and consumes >75% of meals. Pt reiterated allergies to RD, RD verified allergies in EMR. Pt requested RD continue double portions, RD informed all BHS on double portions. No other nutrition issues. Labs and meds reviewed. Nutrition Related Findings:    Appears well nourished, tall. No hx dysphagia, no GI complaints. Last BM unknown. Wound Type: None      Current Nutrition Intake & Therapies:  Average Meal Intake: %  Average Supplement Intake: None ordered  ADULT DIET Regular; double portions, no sharps or metals    Anthropometric Measures:  Height: 6' 7.02\" (200.7 cm)  Ideal Body Weight (IBW): 220 lbs (100 kg)     Current Body Wt:  104.3 kg (229 lb 15 oz), 104.5 % IBW. Not specified  Current BMI (kg/m2): 25.9  Usual Body Weight: 102.1 kg (225 lb)  % Weight Change (Calculated): 2.2  Weight Adjustment: No adjustment                 BMI Category: Overweight (BMI 25.0-29. 9)      Nutrition Diagnosis:   No nutrition diagnosis at this time     Nutrition Interventions:   Food and/or Nutrient Delivery: Continue current diet  Nutrition Education/Counseling: No recommendations at this time  Coordination of Nutrition Care: Continue to monitor while inpatient  Plan of Care discussed with: pt, RN      Nutrition Monitoring and Evaluation:   Behavioral-Environmental Outcomes: None identified  Food/Nutrient Intake Outcomes: Diet advancement/tolerance,Food and nutrient intake  Physical Signs/Symptoms Outcomes: Weight    Discharge Planning:    No discharge needs at this time    [employer]: Ext [phone], or via [facility]

## 2022-06-23 NOTE — BH NOTES
Behavioral Health Treatment Team Note     Patient goal(s) for today: 'go to groups'  Treatment team focus/goals: continue medication management, group therapy, maintain ADLS, work on safe discharge plan    Progress note: Pt presents with a broad affect and congruent mood. Pt denies SI/HI/AVH at this time. Pt reports that he feels the medicine is working and is 'feeling like myself'. Pt reported that he has been smiling for the first time in a while and has been enjoying the groups. Pt is neatly groomed and oriented x4. Pt has clear speech and thought process. Pt still needs an inpatient level of care in order to coordinate a safe discharge plan    Support Session: writer facilitated a support session with pt and pts mother, Juan A. Pt said that he is feeling better and Juan A reported that she feels the pt sounds better. Writer discussed pt going to [address] and Juan A said she feels that would be very beneficial for the pt. Juan A feels safe and comfortable with pt returning home tomorrow     LOS:  3  Expected LOS: VOL TDO until 6/26     Insurance info/prescription coverage:  [employer]   Date of last family contact:  today  Family requesting physician contact today:  no  Discharge plan: Will return home and complete PHP  Guns in the home:  no   Outpatient provider(s):   Will be coordinated prior to discharge     Participating treatment team members: Lu Grant., * (assigned SW), BEAU MONTES

## 2022-06-24 VITALS
HEIGHT: 78 IN | SYSTOLIC BLOOD PRESSURE: 128 MMHG | RESPIRATION RATE: 16 BRPM | DIASTOLIC BLOOD PRESSURE: 85 MMHG | BODY MASS INDEX: 26.61 KG/M2 | HEART RATE: 61 BPM | OXYGEN SATURATION: 99 % | WEIGHT: 230 LBS | TEMPERATURE: 98.3 F

## 2022-06-24 PROCEDURE — 74011250637 HC RX REV CODE- 250/637: Performed by: PSYCHIATRY & NEUROLOGY

## 2022-06-24 RX ADMIN — PANTOPRAZOLE SODIUM 40 MG: 40 TABLET, DELAYED RELEASE ORAL at 09:38

## 2022-06-24 RX ADMIN — SERTRALINE HYDROCHLORIDE 50 MG: 50 TABLET ORAL at 09:38

## 2022-06-24 RX ADMIN — OLANZAPINE 5 MG: 5 TABLET, FILM COATED ORAL at 09:37

## 2022-06-24 NOTE — DISCHARGE SUMMARY
PSYCHIATRIC DISCHARGE SUMMARY         IDENTIFICATION:    Patient Name  Sarmad Doir. Date of Birth 1985   [facility] [id number]   Medical Record Number  [id number]      Age  30 y.o. PCP Nadeem Richards MD   Admit date:  6/20/2022    Discharge date: 6/24/2022   Room Number  243/02  @ [address] [facility]   Date of Service  6/24/2022            TYPE OF DISCHARGE: REGULAR               CONDITION AT DISCHARGE: stable       PROVISIONAL & DISCHARGE DIAGNOSES:      Schizoaffective disorder   PTSD (post-traumatic stress disorder) (6/20/2022)   Hx of bipolar disorder (6/20/2022)  Marijuana use     CC & HISTORY OF PRESENT ILLNESS:  CC: Disorganized, psychotic         HISTORY OF PRESENT ILLNESS:      The patient, Sarmad Castañeda, is a 30 y.o. BLACK/ male admitted to the behavioral health floor after patient presents to the emergency department with acute psychosis. Patient poor historian at the time of admission. He presents responding to internal stimuli. Patient also presented disorganized in the emergency department. He was reported, frequently getting up started yelling and trying to leave the room. Patient also reported to have made threats to staff member. Patient was deemed incapable of making decisions. Patient during initial psychiatric assessment on the floor presents labile with poor eye contact disorganized with thought process and presents with odd behavior. He denies having any active suicidal thoughts. As per the information available patient has witnessed his stepfather commit suicide by a self-inflicted gunshot wound to the head recently. Patient is a poor historian and information is as obtained from review of electronic records talking to the patient and collateral obtained.      SOCIAL HISTORY:    Social History     Socioeconomic History    Marital status: SINGLE     Spouse name: Not on file    Number of children: Not on file    Years of education: Not on file    Highest education level: Not on file   Occupational History    Not on file   Tobacco Use    Smoking status: Current Every Day Smoker     Packs/day: 0.50     Years: 20.00     Pack years: 10.00     Types: Cigarettes    Smokeless tobacco: Never Used   Substance and Sexual Activity    Alcohol use: Not Currently     Comment: patient states it has been 6 months since his last drink    Drug use: Not Currently     Types: Marijuana    Sexual activity: Not Currently   Other Topics Concern     Service Not Asked    Blood Transfusions Not Asked    Caffeine Concern Not Asked    Occupational Exposure Not Asked    Hobby Hazards Not Asked    Sleep Concern Not Asked    Stress Concern Not Asked    Weight Concern Not Asked    Special Diet Not Asked    Back Care Not Asked    Exercise Not Asked    Bike Helmet Not Asked    Seat Belt Not Asked    Self-Exams Not Asked   Social History Narrative    Not on file     Social Determinants of Health     Financial Resource Strain:     Difficulty of Paying Living Expenses: Not on file   Food Insecurity:     Worried About Running Out of Food in the Last Year: Not on file    Alessandro of Food in the Last Year: Not on file   Transportation Needs:     Lack of Transportation (Medical): Not on file    Lack of Transportation (Non-Medical):  Not on file   Physical Activity:     Days of Exercise per Week: Not on file    Minutes of Exercise per Session: Not on file   Stress:     Feeling of Stress : Not on file   Social Connections:     Frequency of Communication with Friends and Family: Not on file    Frequency of Social Gatherings with Friends and Family: Not on file    Attends [employer] Services: Not on file    Active Member of Clubs or Organizations: Not on file    Attends Club or Organization Meetings: Not on file    Marital Status: Not on file   Intimate Partner Violence:     Fear of Current or Ex-Partner: Not on file    Emotionally Abused: Not on file    Physically Abused: Not on file    Sexually Abused: Not on file   Housing Stability:     Unable to Pay for Housing in the Last Year: Not on file    Number of Places Lived in the Last Year: Not on file    Unstable Housing in the Last Year: Not on file      FAMILY HISTORY:   Family History   Problem Relation Age of Onset    Diabetes Mother     Asthma Mother     Hypertension Mother     Cancer Father     Cancer Maternal Grandmother              HOSPITALIZATION COURSE:    Meryle Prost. was admitted to the inpatient psychiatric unit [facility] for acute psychiatric stabilization in regards to symptomatology as described in the HPI above. While on the unit Meryle Prost. was involved in individual, group, occupational and milieu therapy. Psychiatric medications were adjusted during this hospitalization. Meryle Prost. demonstrated a slow, but progressive improvement in overall condition. Much of patient's depression appeared to be related to situational stressors and psychological factors. Please see individual progress notes for more specific details regarding patient's hospitalization course. At time of discharge, Meryle Prost. is without significant problems of depression, psychosis, nay. Patient free of suicidal and homicidal ideations and reports many positive predictive factors in terms of not attempting suicide or homicide. Patient has maximized benefit to be derived from acute inpatient psychiatric treatment. All members of the treatment team concur with each other in regards to plans for discharge today per patient's request.  Patient and family are aware and in agreement with discharge and discharge plan.          LABS AND IMAGAING:    Labs Reviewed   METABOLIC PANEL, COMPREHENSIVE - Abnormal; Notable for the following components:       Result Value    Potassium 3.3 (*)     BUN/Creatinine ratio 5 (*)     A-G Ratio 1.0 (*)     All other components within normal limits   ETHYL ALCOHOL - Abnormal; Notable for the following components:    ALCOHOL(ETHYL),SERUM 162 (*)     All other components within normal limits   URINALYSIS W/ RFLX MICROSCOPIC - Abnormal; Notable for the following components:    Blood Small (*)     All other components within normal limits   DRUG SCREEN, URINE - Abnormal; Notable for the following components:    THC (TH-CANNABINOL) Positive (*)     All other components within normal limits   LIPASE - Abnormal; Notable for the following components:    Lipase 70 (*)     All other components within normal limits   ACETAMINOPHEN - Abnormal; Notable for the following components:    Acetaminophen level <10 (*)     All other components within normal limits   SALICYLATE - Abnormal; Notable for the following components:    Salicylate level 2.1 (*)     All other components within normal limits   URINE MICROSCOPIC - Abnormal; Notable for the following components:    Mucus Trace (*)     All other components within normal limits   COVID-19 WITH INFLUENZA A/B   CBC WITH AUTOMATED DIFF     No results found for: DS35, PHEN, PHENO, PHENT, DILF, DS39, PHENY, PTN, VALF2, VALAC, VALP, VALPR, DS6, CRBAM, CRBAMP, CARB2, XCRBAM  Admission on 06/20/2022   Component Date Value Ref Range Status    WBC 06/20/2022 7.8  4.1 - 11.1 K/uL Final    RBC 06/20/2022 4.17  4.10 - 5.70 M/uL Final    HGB 06/20/2022 13.3  12.1 - 17.0 g/dL Final    HCT 06/20/2022 41.3  36.6 - 50.3 % Final    MCV 06/20/2022 99.0  80.0 - 99.0 FL Final    MCH 06/20/2022 31.9  26.0 - 34.0 PG Final    MCHC 06/20/2022 32.2  30.0 - 36.5 g/dL Final    RDW 06/20/2022 13.2  11.5 - 14.5 % Final    PLATELET 98/26/5646 645  150 - 400 K/uL Final    MPV 06/20/2022 12.0  8.9 - 12.9 FL Final    NRBC 06/20/2022 0.0  0.0  WBC Final    ABSOLUTE NRBC 06/20/2022 0.00  0.00 - 0.01 K/uL Final    NEUTROPHILS 06/20/2022 51  32 - 75 % Final    LYMPHOCYTES 06/20/2022 36  12 - 49 % Final    MONOCYTES 06/20/2022 10  5 - 13 % Final    EOSINOPHILS 06/20/2022 2  0 - 7 % Final    BASOPHILS 06/20/2022 1  0 - 1 % Final    IMMATURE GRANULOCYTES 06/20/2022 0  0 - 0.5 % Final    ABS. NEUTROPHILS 06/20/2022 4.0  1.8 - 8.0 K/UL Final    ABS. LYMPHOCYTES 06/20/2022 2.8  0.8 - 3.5 K/UL Final    ABS. MONOCYTES 06/20/2022 0.8  0.0 - 1.0 K/UL Final    ABS. EOSINOPHILS 06/20/2022 0.2  0.0 - 0.4 K/UL Final    ABS. BASOPHILS 06/20/2022 0.1  0.0 - 0.1 K/UL Final    ABS. IMM. GRANS. 06/20/2022 0.0  0.00 - 0.04 K/UL Final    DF 06/20/2022 AUTOMATED    Final    Sodium 06/20/2022 140  136 - 145 mmol/L Final    Potassium 06/20/2022 3.3* 3.5 - 5.1 mmol/L Final    Chloride 06/20/2022 108  97 - 108 mmol/L Final    CO2 06/20/2022 25  21 - 32 mmol/L Final    Anion gap 06/20/2022 7  5 - 15 mmol/L Final    Glucose 06/20/2022 89  65 - 100 mg/dL Final    BUN 06/20/2022 6  6 - 20 mg/dL Final    Creatinine 06/20/2022 1.20  0.70 - 1.30 mg/dL Final    BUN/Creatinine ratio 06/20/2022 5* 12 - 20   Final    GFR est AA 06/20/2022 >60  >60 ml/min/1.73m2 Final    GFR est non-AA 06/20/2022 >60  >60 ml/min/1.73m2 Final    Calcium 06/20/2022 9.0  8.5 - 10.1 mg/dL Final    Bilirubin, total 06/20/2022 0.8  0.2 - 1.0 mg/dL Final    AST (SGOT) 06/20/2022 20  15 - 37 U/L Final    ALT (SGPT) 06/20/2022 19  12 - 78 U/L Final    Alk.  phosphatase 06/20/2022 87  45 - 117 U/L Final    Protein, total 06/20/2022 8.1  6.4 - 8.2 g/dL Final    Albumin 06/20/2022 4.1  3.5 - 5.0 g/dL Final    Globulin 06/20/2022 4.0  2.0 - 4.0 g/dL Final    A-G Ratio 06/20/2022 1.0* 1.1 - 2.2   Final    ALCOHOL(ETHYL),SERUM 06/20/2022 162* <10 mg/dL Final    Color 06/20/2022 Yellow/Straw    Final    Appearance 06/20/2022 Clear  Clear   Final    Specific gravity 06/20/2022 1.009  1.003 - 1.030   Final    pH (UA) 06/20/2022 6.0  5.0 - 8.0   Final    Protein 06/20/2022 Negative  Negative mg/dL Final    Glucose 06/20/2022 Negative  Negative mg/dL Final    Ketone 06/20/2022 Negative  Negative mg/dL Final    Bilirubin 06/20/2022 Negative  Negative   Final Blood 06/20/2022 Small* Negative   Final    Urobilinogen 06/20/2022 0.1  0.1 - 1.0 EU/dL Final    Nitrites 06/20/2022 Negative  Negative   Final    Leukocyte Esterase 06/20/2022 Negative  Negative   Final    AMPHETAMINES 06/20/2022 Negative  Negative   Final    BARBITURATES 06/20/2022 Negative  Negative   Final    BENZODIAZEPINES 06/20/2022 Negative  Negative   Final    COCAINE 06/20/2022 Negative  Negative   Final    METHADONE 06/20/2022 Negative  Negative   Final    OPIATES 06/20/2022 Negative  Negative   Final    PCP(PHENCYCLIDINE) 06/20/2022 Negative  Negative   Final    THC (TH-CANNABINOL) 06/20/2022 Positive* Negative   Final    Drug screen comment 06/20/2022 This test is a screen for drugs of abuse in a medical setting only (i.e., they are unconfirmed results and as such must not be used for non-medical purposes, e.g.,employment testing, legal testing). Due to its inherent nature, false positive (FP) and false negative (FN) results may be obtained. Therefore, if necessary for medical care, recommend confirmation of positive findings by GC/MS. Final    Lipase 06/20/2022 70* 73 - 393 U/L Final    Acetaminophen level 06/20/2022 <10* 10 - 30 ug/mL Final    Salicylate level 37/93/1877 2.1* 2.8 - 20.0 mg/dL Final    SARS-CoV-2 by PCR 06/20/2022 Not Detected  Not Detected   Final    Influenza A by PCR 06/20/2022 Not Detected  Not Detected   Final    Influenza B by PCR 06/20/2022 Not Detected  Not Detected   Final    WBC 06/20/2022 0-4  0 - 4 /hpf Final    RBC 06/20/2022 0-5  0 - 5 /hpf Final    Bacteria 06/20/2022 Negative  Negative /hpf Final    Mucus 06/20/2022 Trace* Negative /lpf Final     No results found. DISPOSITION:    Patient to f/u with psychiatric and psychotherapy appointments. FOLLOW-UP CARE:    Activity as tolerated  Regular Diet  Wound Care: none needed.   Follow-up Information       Follow up With Specialties Details Why Jennifer Serrano MD Gastroenterology Schedule an appointment as soon as possible for a visit in 4 weeks Post-hospitalization follow-up - [facility] [address] [facility] [address]      Juan Redd MD Internal Medicine Physician   [facility],[address], [facility]  [phone]                     PROGNOSIS:    Limited ---- based on nature of patient's pathology/ies and treatment compliance issues. Prognosis is greatly dependent upon patient's ability to follow up with and psychiatric/psychotherapy appointments as well as to comply with psychiatric medications as prescribed. DISCHARGE MEDICATIONS:    Informed consent given for the use of following psychotropic medications:  Current Discharge Medication List        CONTINUE these medications which have CHANGED    Details   !! OLANZapine (ZyPREXA) 20 mg tablet Take 1 Tablet by mouth nightly. Qty: 30 Tablet, Refills: 1  Start date: 6/23/2022      !! OLANZapine (ZyPREXA) 5 mg tablet Take 1 Tablet by mouth daily. Qty: 30 Tablet, Refills: 1  Start date: 6/24/2022      sertraline (Zoloft) 50 mg tablet Take 1 Tablet by mouth daily. Qty: 30 Tablet, Refills: 1  Start date: 6/23/2022       !! - Potential duplicate medications found. Please discuss with provider. CONTINUE these medications which have NOT CHANGED    Details   ciprofloxacin HCl (CIPRO) 500 mg tablet Take  by mouth two (2) times a day. docusate sodium (COLACE) 100 mg capsule Take 100 mg by mouth two (2) times a day. simethicone (Gas Relief Extra Strength) 125 mg capsule Take 125 mg by mouth four (4) times daily as needed for Flatulence. raNITIdine (ZANTAC) 150 mg tablet Take 150 mg by mouth two (2) times a day. clindamycin (CLINDAGEL) 1 % topical gel Apply  to affected area two (2) times a day. use thin film on affected area      famotidine (PEPCID) 20 mg tablet Take 20 mg by mouth two (2) times a day.       linaCLOtide (Linzess) 145 mcg cap capsule Take  by mouth Daily (before breakfast). cholecalciferol, vitamin D3, (Vitamin D3) 50 mcg (2,000 unit) tab Take  by mouth. ondansetron (ZOFRAN ODT) 4 mg disintegrating tablet Take 1 Tab by mouth every eight (8) hours as needed for Nausea. Qty: 60 Tab, Refills: 0    Associated Diagnoses: Nausea      pantoprazole (PROTONIX) 40 mg tablet Take 1 Tab by mouth daily.   Qty: 30 Tab, Refills: 2           STOP taking these medications       hydrocortisone (HYTONE) 2.5 % topical cream Comments:   Reason for Stopping:         PARoxetine (PAXIL) 10 mg tablet Comments:   Reason for Stopping:         Omeprazole delayed release (PRILOSEC D/R) 20 mg tablet Comments:   Reason for Stopping:         EPINEPHrine (EPIPEN) 0.3 mg/0.3 mL injection Comments:   Reason for Stopping:                      Signed:  Sean Mcdonald MD  6/24/2022

## 2022-06-24 NOTE — GROUP NOTE
IP  GROUP DOCUMENTATION INDIVIDUAL                                                                          Group Therapy Note    Date: 6/24/2022    Group Start Time: 0935  Group End Time: 1020  Group Topic: Education Group - Inpatient    [facility] 2 [facility] NON ACUTE    Kasia Dark    IP  GROUP DOCUMENTATION GROUP    Group Therapy Note    Facilitated discussion focused on defining different types of defense mechanisms and being able to recognize some defenses that was used to cope with stress and anxiety    Attendees: 5/8         Attendance: Attended    Patient's Goal:  Attend group daily     Interventions/techniques: Informed and Supported    Follows Directions: Followed directions    Interactions: Interacted appropriately    Mental Status: Calm    Behavior/appearance: Cooperative    Goals Achieved: Able to engage in interactions, Able to listen to others, Able to self-disclose and Discussed coping      Additional Notes:  Receptive to information discussed and engaged.  Was able to share personal example of a defense used prior to admission or in the past such as \"acting out, displacement and help-rejecting complaining\"    Vince Hinson

## 2022-06-24 NOTE — PROGRESS NOTES
Problem: Falls - Risk of  Goal: *Absence of Falls  Description: Document Shaheen Hutchins Fall Risk and appropriate interventions in the flowsheet.   Outcome: Resolved/Met  Note: Fall Risk Interventions:       Mentation Interventions: Room close to nurse's station    Medication Interventions: Evaluate medications/consider consulting pharmacy         History of Falls Interventions: Room close to nurse's station,Investigate reason for fall         Problem: Patient Education: Go to Patient Education Activity  Goal: Patient/Family Education  Outcome: Resolved/Met     Problem: Suicide  Goal: *STG: Remains safe in hospital  Outcome: Resolved/Met  Goal: *STG: Seeks staff when feelings of self harm or harm towards others arise  Outcome: Resolved/Met  Goal: *STG: Attends activities and groups  Outcome: Resolved/Met  Goal: *STG:  Verbalizes alternative ways of dealing with maladaptive feelings/behaviors  Outcome: Resolved/Met  Goal: *STG/LTG: Complies with medication therapy  Outcome: Resolved/Met  Goal: *STG/LTG: No longer expresses self destructive or suicidal thoughts  Outcome: Resolved/Met  Goal: *LTG:  Identifies available community resources  Outcome: Resolved/Met  Goal: *LTG:  Develops proactive suicide prevention plan  Outcome: Resolved/Met  Goal: Interventions  Outcome: Resolved/Met     Problem: Patient Education: Go to Patient Education Activity  Goal: Patient/Family Education  Outcome: Resolved/Met     Problem: Psychosis  Goal: *STG: Decreased hallucinations  Outcome: Resolved/Met  Goal: *STG: Decreased delusional thinking  Outcome: Resolved/Met  Goal: *STG: Remains safe in hospital  Outcome: Resolved/Met  Goal: *STG: Seeks staff when feelings of self harm or harm towards others arise  Outcome: Resolved/Met  Goal: *STG: Patient will verbalize areas in need of boundary recognition and limit setting  Outcome: Resolved/Met  Goal: *STG: Patient will develop strategies to regulate emotions and corresponding behaviors  Outcome: Resolved/Met  Goal: *STG: Accept constructive criticism without injury or isolation  Outcome: Resolved/Met  Goal: *STG: Participates in individual and group therapy  Outcome: Resolved/Met  Goal: *STG: Develop safety plan for times when triggered in stressful situations  Outcome: Resolved/Met  Goal: *STG: Patient will verbalize issues that get in their way of progress (i.e.: anger, fear etc.)  Outcome: Resolved/Met  Goal: *STG/LTG: Complies with medication therapy  Outcome: Resolved/Met  Goal: *STG/LTG: Demonstrates improved thought patterns as evidenced by logical and coherent speech  Outcome: Resolved/Met  Goal: *STG/LTG: Demonstrates improved social functioning by responding appropriately to staff  Outcome: Resolved/Met  Goal: Interventions  Outcome: Resolved/Met

## 2022-06-24 NOTE — PROGRESS NOTES
Progress Note        Room:243/02  Patient Mely Niño. YOB: 1985     Age:37 y.o. Subjective    Subjective   Patient presents disorganized with his thought process. Delay in responding to conversation. Presents depressed disheveled poor eye contact. Admits to feeling depressed. Has been compliant with this medication. Has been engaging in groups as possible some improvement in his energy noted. .    Mental status examination-  Patient is alert oriented to name place. Not actively seen responding to internal stimuli. But noted to lay in conversation and responding to internal stimuli. Patient denies hearing voices. No evidence quiet and guarded. Insight judgment and coping continuing to improve. Review of Systems  Objective         Vitals Last 24 Hours:  TEMPERATURE:  Temp  Av.3 °F (36.8 °C)  Min: 98.2 °F (36.8 °C)  Max: 98.3 °F (36.8 °C)  RESPIRATIONS RANGE: Resp  Av  Min: 16  Max: 18  PULSE OXIMETRY RANGE: No data recorded  PULSE RANGE: Pulse  Av.5  Min: 61  Max: 90  BLOOD PRESSURE RANGE: Systolic (08ZRU), YXT:989 , Min:127 , SHE:899   ; Diastolic (00HVX), KWC:54, Min:85, Max:85    I/O (24Hr): No intake or output data in the 24 hours ending 22  Objective  Labs/Imaging/Diagnostics    Labs:  CBC:No results for input(s): WBC, RBC, HGB, HCT, MCV, RDW, PLT, HGBEXT, HCTEXT, PLTEXT in the last 72 hours. CHEMISTRIES:No results for input(s): NA, K, CL, CO2, BUN, CA, PHOS, MG in the last 72 hours. No lab exists for component: CREATININE, GLUCOSEPT/INR:No results for input(s): INR, INREXT in the last 72 hours. No lab exists for component: PROTIME  APTT:No results for input(s): APTT in the last 72 hours. LIVER PROFILE:No results for input(s): AST, ALT in the last 72 hours.     No lab exists for component: Kayode Louise ALKPHOS  Lab Results   Component Value Date/Time    ALT (SGPT) 2022 04:22 PM    AST (SGOT) 20 06/20/2022 04:22 PM    Alk. phosphatase 87 06/20/2022 04:22 PM    Bilirubin, total 0.8 06/20/2022 04:22 PM       Imaging Last 24 Hours:  No results found. Assessment//Plan   Active Problems:    PTSD (post-traumatic stress disorder) (6/20/2022)      Hx of bipolar disorder (6/20/2022)      Assessment & Plan  Continue to titrate Zyprexa 5 mg in the morning 20 mg at bedtime.   Continue Zoloft 50 mg p.o. daily  Provided support  To obtain further collateral and family meeting  Safety planning    Current Facility-Administered Medications:     hydrOXYzine HCL (ATARAX) tablet 50 mg, 50 mg, Oral, TID PRN, Faiza Puri MD, 50 mg at 06/22/22 2132    traZODone (DESYREL) tablet 50 mg, 50 mg, Oral, QHS PRN, Faiza Puri MD, 50 mg at 06/23/22 2137    acetaminophen (TYLENOL) tablet 650 mg, 650 mg, Oral, Q4H PRN, Faiza Puri MD    magnesium hydroxide (MILK OF MAGNESIA) 400 mg/5 mL oral suspension 30 mL, 30 mL, Oral, DAILY PRN, Sunshine Macias MD    nicotine (NICODERM CQ) 14 mg/24 hr patch 1 Patch, 1 Patch, TransDERmal, DAILY, Sunshine Macias MD, 1 Patch at 06/23/22 0840    alum-mag hydroxide-simeth (MYLANTA) oral suspension 30 mL, 30 mL, Oral, Q4H PRN, Sunshine Macias MD    OLANZapine (ZyPREXA) tablet 20 mg, 20 mg, Oral, QHS, Sunshine Macias MD, 20 mg at 06/23/22 2136    sertraline (ZOLOFT) tablet 50 mg, 50 mg, Oral, DAILY, Sunshine Macias MD, 50 mg at 06/23/22 0840    pantoprazole (PROTONIX) tablet 40 mg, 40 mg, Oral, DAILY, Sunshine Macias MD, 40 mg at 06/23/22 0840    ondansetron (ZOFRAN ODT) tablet 4 mg, 4 mg, Oral, Q6H PRN, Sunshine Macias MD    OLANZapine (ZyPREXA) tablet 5 mg, 5 mg, Oral, DAILY, Sunshine Macias MD, 5 mg at 06/23/22 0840  Electronically signed by Augustine Ferrell MD on 6/23/2022 at 10:52 PM

## 2022-06-24 NOTE — GROUP NOTE
GHULAM  GROUP DOCUMENTATION INDIVIDUAL                                                                          Group Therapy Note    Date: 6/24/2022    Group Start Time: 1115  Group End Time: 1200  Group Topic: Process Group - Inpatient    [facility] 2 [facility] TH ACUTE    Yovana Carreon 99 GROUP DOCUMENTATION GROUP    Group Therapy Note  Pts were given a worksheet on feelings and triggers but asked to talked more about positive feelings. Pts shared positive feelings and what outside factors make them feel \"better\". Attendees: 6/9         Attendance: Attended    Patient's Goal:  To attend group and participate in activities    Interventions/techniques: Challenged and Informed    Follows Directions: Followed directions    Interactions: Interacted appropriately    Mental Status: Happy    Behavior/appearance: Attentive, Cooperative, Enthusiastic, Motivated and Neatly groomed    Goals Achieved: Able to engage in interactions, Able to listen to others, Able to give feedback to another, Able to reflect/comment on own behavior, Able to manage/cope with feelings, Able to receive feedback, Able to self-disclose, Discussed self-esteem issues, Displayed empathy and Identified feelings      Additional Notes:  Pt was able to share positive feelings and provided others feedback. Pt shared that he meditates and listens to the [facility]

## 2022-06-24 NOTE — PROGRESS NOTES
Progress Note  Date:2022       Room:[facility]  Patient Sami Recinos. YOB: 1985     Age:37 y.o. Subjective    Subjective   Patient continuing to feel better. He denies having any auditory or visual hallucinations. He is more spontaneous with conversation. Denies any side effects from his medication. Sleep and appetite has been improving. Mental status examination-improvement in his affect and mood. Pleasant on approach no command hallucinations or responding to internal stimuli. Insight judgment coping continuing to improve. No acute distress noted denied any nightmares. Review of Systems  Objective         Vitals Last 24 Hours:  TEMPERATURE:  Temp  Av.3 °F (36.8 °C)  Min: 98.2 °F (36.8 °C)  Max: 98.3 °F (36.8 °C)  RESPIRATIONS RANGE: Resp  Av  Min: 16  Max: 18  PULSE OXIMETRY RANGE: No data recorded  PULSE RANGE: Pulse  Av.5  Min: 61  Max: 90  BLOOD PRESSURE RANGE: Systolic (79UWZ), THI:451 , Min:127 , IKS:920   ; Diastolic (04ZHS), UDO:75, Min:85, Max:85    I/O (24Hr): No intake or output data in the 24 hours ending 22 2250  Objective  Labs/Imaging/Diagnostics    Labs:  CBC:No results for input(s): WBC, RBC, HGB, HCT, MCV, RDW, PLT, HGBEXT, HCTEXT, PLTEXT in the last 72 hours. CHEMISTRIES:No results for input(s): NA, K, CL, CO2, BUN, CA, PHOS, MG in the last 72 hours. No lab exists for component: CREATININE, GLUCOSEPT/INR:No results for input(s): INR, INREXT in the last 72 hours. No lab exists for component: PROTIME  APTT:No results for input(s): APTT in the last 72 hours. LIVER PROFILE:No results for input(s): AST, ALT in the last 72 hours. No lab exists for component: TERRELL MiguelPHOS  Lab Results   Component Value Date/Time    ALT (SGPT) 19 2022 04:22 PM    AST (SGOT) 20 2022 04:22 PM    Alk.  phosphatase 87 2022 04:22 PM    Bilirubin, total 0.8 2022 04:22 PM Imaging Last 24 Hours:  No results found.   Assessment//Plan   Active Problems:    PTSD (post-traumatic stress disorder) (6/20/2022)      Hx of bipolar disorder (6/20/2022)      Assessment & Plan  Continue current medications  No dystonic symptoms noted  Safety planning family meeting today  Discharge planning for 6/24/2022      Current Facility-Administered Medications:     hydrOXYzine HCL (ATARAX) tablet 50 mg, 50 mg, Oral, TID PRN, Angelito Gill MD, 50 mg at 06/22/22 2132    traZODone (DESYREL) tablet 50 mg, 50 mg, Oral, QHS PRN, Angelito Gill MD, 50 mg at 06/23/22 2137    acetaminophen (TYLENOL) tablet 650 mg, 650 mg, Oral, Q4H PRN, Angelito Gill MD    magnesium hydroxide (MILK OF MAGNESIA) 400 mg/5 mL oral suspension 30 mL, 30 mL, Oral, DAILY PRN, Sunshine Macias MD    nicotine (NICODERM CQ) 14 mg/24 hr patch 1 Patch, 1 Patch, TransDERmal, DAILY, Sunshine Macias MD, 1 Patch at 06/23/22 0840    alum-mag hydroxide-simeth (MYLANTA) oral suspension 30 mL, 30 mL, Oral, Q4H PRN, Sunshine Macias MD    OLANZapine (ZyPREXA) tablet 20 mg, 20 mg, Oral, QHS, Sunshine Macias MD, 20 mg at 06/23/22 2136    sertraline (ZOLOFT) tablet 50 mg, 50 mg, Oral, DAILY, Sunshine Macias MD, 50 mg at 06/23/22 0840    pantoprazole (PROTONIX) tablet 40 mg, 40 mg, Oral, DAILY, Sunshine Macias MD, 40 mg at 06/23/22 0840    ondansetron (ZOFRAN ODT) tablet 4 mg, 4 mg, Oral, Q6H PRN, Sunshine Macias MD    OLANZapine (ZyPREXA) tablet 5 mg, 5 mg, Oral, DAILY, Angelito Gill MD, 5 mg at 06/23/22 0840  Electronically signed by Maria T Garrido MD on 6/23/2022 at 10:50 PM

## 2022-06-24 NOTE — BH NOTES
Discharge Note:   The pt denied SI/HI/AVH nor displayed any s/s of behavioral/medical distress. The pt denied the need for any further discharge teaching. The pt was escorted off the unit by staff at [phone] with all personal belongings and valuables. The mother met pt at unit door. The pt did not communicate any complaint.

## 2022-06-24 NOTE — BH NOTES
DISCHARGE SUMMARY    Hannah 1521. : 1985  MRN: [id number]    The patient Lacy Lainez exhibits the ability to control behavior in a less restrictive environment. Patient's level of functioning is improving. No assaultive/destructive behavior has been observed for the past 24 hours. No suicidal/homicidal threat or behavior has been observed for the past 24 hours. There is no evidence of serious medication side effects. Patient has not been in physical or protective restraints for at least the past 24 hours. If weapons involved, how are they secured? n/a    Is patient aware of and in agreement with discharge plan? yes    Arrangements for medication:  Prescriptions given to patient, given a weeks supply or 30 day supply. Copy of discharge instructions to provider?:  yes    Arrangements for transportation home:  Pt will be picked up by mother    Keep all follow up appointments as scheduled, continue to take prescribed medications per physician instructions.   Mental health crisis number:  498 or your local mental health crisis line number at [place] [address] Emergency WARM LINE      [phone] ([phone])      M-F: 9am to 9pm      Sat & Sun: 5pm - 9pm  National suicide prevention lines:                             2-442-QLMQRKE ([phone])       5-220-861-TALK ([phone])    Crisis Text Line:  Text HOME to [id number]
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
## 2022-06-24 NOTE — GROUP NOTE
[facility] GROUP DOCUMENTATION INDIVIDUAL                                                                          Group Therapy Note    Date: 6/24/2022    Group Start Time: 0434  Group End Time: 0476  Group Topic: Recreational/Music Therapy    SRM 2  NON ACUTE    [facility]    IP [facility] GROUP DOCUMENTATION GROUP    Group Therapy Note    Facilitated leisure skills group to reinforce positive coping and to manage mood through music, social interaction, group activities and art task    Attendees: 7/9         Attendance: Attended    Patient's Goal:  Attend group daily     Interventions/techniques: Art integration and Supported    Follows Directions: Followed directions    Interactions: Interacted appropriately    Mental Status: Calm    Behavior/appearance: Cooperative    Goals Achieved: Able to engage in interactions and Able to listen to others      Additional Notes:  Receptive to listening to music and songs he selected. Interacted with peers and staff.  Declined to work on leisure task    Jess Caba, [address]

## 2022-07-17 ENCOUNTER — HOSPITAL ENCOUNTER (EMERGENCY)
Age: 37
Discharge: HOME | End: 2022-07-17
Attending: FAMILY MEDICINE
Payer: MEDICAID

## 2022-07-17 ENCOUNTER — APPOINTMENT (OUTPATIENT)
Dept: GENERAL RADIOLOGY | Age: 37
End: 2022-07-17
Attending: EMERGENCY MEDICINE
Payer: MEDICAID

## 2022-07-17 VITALS
HEART RATE: 92 BPM | DIASTOLIC BLOOD PRESSURE: 88 MMHG | BODY MASS INDEX: 27.4 KG/M2 | RESPIRATION RATE: 16 BRPM | SYSTOLIC BLOOD PRESSURE: 134 MMHG | HEIGHT: 76 IN | WEIGHT: 225 LBS | OXYGEN SATURATION: 98 % | TEMPERATURE: 98.4 F

## 2022-07-17 DIAGNOSIS — S60.221A CONTUSION OF RIGHT HAND, INITIAL ENCOUNTER: Primary | ICD-10-CM

## 2022-07-17 PROCEDURE — 73130 X-RAY EXAM OF HAND: CPT

## 2022-07-17 PROCEDURE — 99283 EMERGENCY DEPT VISIT LOW MDM: CPT

## 2022-07-17 RX ORDER — DEXTROMETHORPHAN HYDROBROMIDE, GUAIFENESIN 5; 100 MG/5ML; MG/5ML
650 LIQUID ORAL
Qty: 15 TABLET | Refills: 0 | Status: SHIPPED | OUTPATIENT
Start: 2022-07-17 | End: 2022-07-22

## 2022-07-17 NOTE — DISCHARGE INSTRUCTIONS
Thank you! Thank you for allowing me to care for you in the emergency department. It is my goal to provide you with excellent care. If you have not received excellent quality care, please ask to speak to the nurse manager. Please fill out the survey that will come to you by mail or email since we listen to your feedback! Below you will find a list of your tests from today's visit. Should you have any questions, please do not hesitate to call the emergency department. Labs  No results found for this or any previous visit (from the past 12 hour(s)). Radiologic Studies  XR HAND RT MIN 3 V   Final Result   No acute fracture or dislocation. CT Results  (Last 48 hours)      None          CXR Results  (Last 48 hours)      None          ------------------------------------------------------------------------------------------------------------  The exam and treatment you received in the Emergency Department were for an urgent problem and are not intended as complete care. It is important that you follow-up with a doctor, nurse practitioner, or physician assistant to:  (1) confirm your diagnosis,  (2) re-evaluation of changes in your illness and treatment, and  (3) for ongoing care. Please take your discharge instructions with you when you go to your follow-up appointment. If you have any problem arranging a follow-up appointment, contact the Emergency Department. If your symptoms become worse or you do not improve as expected and you are unable to reach your health care provider, please return to the Emergency Department. We are available 24 hours a day. If a prescription has been provided, please have it filled as soon as possible to prevent a delay in treatment. If you have any questions or reservations about taking the medication due to side effects or interactions with other medications, please call your primary care provider or contact the ER.
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
## 2023-06-08 PROBLEM — F31.9 BIPOLAR 1 DISORDER (HCC): Status: ACTIVE | Noted: 2023-06-08

## 2023-06-20 ENCOUNTER — APPOINTMENT (OUTPATIENT)
Facility: HOSPITAL | Age: 38
End: 2023-06-20
Payer: COMMERCIAL

## 2023-06-20 ENCOUNTER — HOSPITAL ENCOUNTER (EMERGENCY)
Facility: HOSPITAL | Age: 38
Discharge: HOME | End: 2023-06-20
Payer: COMMERCIAL

## 2023-06-20 VITALS
RESPIRATION RATE: 20 BRPM | TEMPERATURE: 98.3 F | WEIGHT: 225 LBS | HEIGHT: 77 IN | HEART RATE: 81 BPM | SYSTOLIC BLOOD PRESSURE: 122 MMHG | BODY MASS INDEX: 26.57 KG/M2 | OXYGEN SATURATION: 98 % | DIASTOLIC BLOOD PRESSURE: 78 MMHG

## 2023-06-20 DIAGNOSIS — S90.31XA CONTUSION OF RIGHT FOOT, INITIAL ENCOUNTER: ICD-10-CM

## 2023-06-20 DIAGNOSIS — S92.501A CLOSED FRACTURE OF PHALANX OF RIGHT SECOND TOE, INITIAL ENCOUNTER: Primary | ICD-10-CM

## 2023-06-20 PROCEDURE — 73630 X-RAY EXAM OF FOOT: CPT

## 2023-06-20 PROCEDURE — 6370000000 HC RX 637 (ALT 250 FOR IP): Performed by: NURSE PRACTITIONER

## 2023-06-20 PROCEDURE — 99283 EMERGENCY DEPT VISIT LOW MDM: CPT

## 2023-06-20 RX ORDER — ACETAMINOPHEN 500 MG
1000 TABLET ORAL
Status: COMPLETED | OUTPATIENT
Start: 2023-06-20 | End: 2023-06-20

## 2023-06-20 RX ADMIN — ACETAMINOPHEN 1000 MG: 500 TABLET ORAL at 16:31

## 2023-06-20 ASSESSMENT — LIFESTYLE VARIABLES
HOW OFTEN DO YOU HAVE A DRINK CONTAINING ALCOHOL: NEVER
HOW MANY STANDARD DRINKS CONTAINING ALCOHOL DO YOU HAVE ON A TYPICAL DAY: PATIENT DOES NOT DRINK

## 2023-06-20 ASSESSMENT — PAIN - FUNCTIONAL ASSESSMENT: PAIN_FUNCTIONAL_ASSESSMENT: 0-10

## 2023-06-20 ASSESSMENT — PAIN SCALES - GENERAL: PAINLEVEL_OUTOF10: 8

## 2023-06-20 NOTE — ED NOTES
Patients toe was vinnie taped to surrounding toe. Post-op shoe applied.    Pt wheeled to car where mother took them home     [facility]  06/20/23 1128

## 2023-06-20 NOTE — ED PROVIDER NOTES
Nondisplaced fracture involving the base of the second proximal   phalanx. ED COURSE and DIFFERENTIAL DIAGNOSIS/MDM   CC/HPI Summary, DDx, ED Course, and Reassessment: Patient is a 41 y.o. male presenting for right foot injury. Vitals reveal  no acute abnormalities  and physical exam reveals  findings as documented above  with no neurovascular compromise. Based on the history, physical exam, risk factors, and vitals signs, differential includes: sprain, strain, ligamentous injury, fracture, dislocation, soft tissue contusion. Nerve and vascular damage is unlikely as the patient has a completely benign neurovascular exam. Presentation is very likely a sprain vs soft tissue contusion. ED imaging ordered. Further documentation in ED Course. Records Reviewed (source and summary of external notes): Prior medical records and Nursing notes    Vitals:    Vitals:    06/20/23 1542   BP: 122/78   Pulse: 81   Resp: 20   Temp: 98.3 °F (36.8 °C)   TempSrc: Oral   SpO2: 98%   Weight: 102.1 kg (225 lb)   Height: 1.956 m (6' 5\")        ED COURSE  ED Course as of 06/20/23 1635   Tue Jun 20, 2023   1634 Nondisplaced fracture to the base of the proximal phalanx of the second toe of the right foot. Consulted with Kb Davenport, podiatry who agreed with plan for vinnie taping and postop shoe and will follow as outpatient. Patient and his significant other were given the instructions, they are familiar with the podiatry practice as his significant other is already an established patient there. Gave strict return precautions, he will use over-the-counter medications for symptoms management, RICE therapy. He demonstrated understanding, comfort and agreement with discharge and treatment plan.  [KR]      ED Course User Index  [KR] ALEX Gao NP       Disposition Considerations (Tests not done, Shared Decision Making, Pt Expectation of Test or Treatment.): See ED Course    Patient was given the following

## 2023-06-20 NOTE — ED TRIAGE NOTES
Pt c/o possible broken right second toe. Believes he has kicked something, unsure of what he may have kicked.  Woke up this morning with it \"locked\"

## 2023-06-22 ENCOUNTER — OFFICE VISIT (OUTPATIENT)
Age: 38
End: 2023-06-22
Payer: COMMERCIAL

## 2023-06-22 VITALS
DIASTOLIC BLOOD PRESSURE: 66 MMHG | HEIGHT: 77 IN | TEMPERATURE: 98.1 F | SYSTOLIC BLOOD PRESSURE: 112 MMHG | HEART RATE: 66 BPM | BODY MASS INDEX: 26.57 KG/M2 | WEIGHT: 225 LBS

## 2023-06-22 DIAGNOSIS — S92.501A CLOSED FRACTURE OF PHALANX OF RIGHT SECOND TOE, INITIAL ENCOUNTER: Primary | ICD-10-CM

## 2023-06-22 PROCEDURE — 99203 OFFICE O/P NEW LOW 30 MIN: CPT | Performed by: PODIATRIST

## 2023-06-26 ASSESSMENT — ENCOUNTER SYMPTOMS
SHORTNESS OF BREATH: 0
DIARRHEA: 0
ABDOMINAL PAIN: 0
VOMITING: 0

## 2023-08-07 ENCOUNTER — OFFICE VISIT (OUTPATIENT)
Age: 38
End: 2023-08-07
Payer: COMMERCIAL

## 2023-08-07 VITALS
HEART RATE: 61 BPM | DIASTOLIC BLOOD PRESSURE: 60 MMHG | HEIGHT: 77 IN | WEIGHT: 225 LBS | SYSTOLIC BLOOD PRESSURE: 97 MMHG | TEMPERATURE: 97.5 F | BODY MASS INDEX: 26.57 KG/M2

## 2023-08-07 DIAGNOSIS — S92.501D CLOSED FRACTURE OF PHALANX OF RIGHT SECOND TOE WITH ROUTINE HEALING, SUBSEQUENT ENCOUNTER: Primary | ICD-10-CM

## 2023-08-07 PROCEDURE — 99213 OFFICE O/P EST LOW 20 MIN: CPT | Performed by: PODIATRIST

## 2023-08-07 PROCEDURE — 73630 X-RAY EXAM OF FOOT: CPT | Performed by: PODIATRIST

## 2023-09-05 ASSESSMENT — ENCOUNTER SYMPTOMS
ABDOMINAL PAIN: 0
DIARRHEA: 0
VOMITING: 0
SHORTNESS OF BREATH: 0

## 2023-09-05 NOTE — PROGRESS NOTES
[facility] FOOT SURGERY         Patient Name: Marilyn Russo. : 1985    Visit Date: 2023    Office Visit Note      Subjective:         Patient is a 41 y.o. male who is being seen in office follow up visit for fracture to the right second toe. Patient states that he is been wearing surgical shoe at this time. Patient states that his pain has improved. Past Medical History:   Diagnosis Date    Anxiety     Everett's esophagus 2020    Bipolar 1 disorder ([address])     Cannabis abuse 2020    Cirrhosis, alcoholic ([address])     Depression     Gastritis 2020    GERD (gastroesophageal reflux disease)     Perianal pain 2020    Thyroid disease     Thyroid function test abnormal 2020     Past Surgical History:   Procedure Laterality Date    COLONOSCOPY      GI      Biopsy of liver    UPPER GASTROINTESTINAL ENDOSCOPY      US GUIDED LIVER BIOPSY PERCUTANEOUS  2017    US GUIDED LIVER BIOPSY PERCUTANEOUS 2017 [facility] RAD US       Family History   Problem Relation Age of Onset    Cancer Maternal Grandmother     Cancer Father     Asthma Mother     Diabetes Mother     Hypertension Mother       Social History     Tobacco Use    Smoking status: Every Day     Packs/day: 0.50     Types: Cigarettes    Smokeless tobacco: Never   Substance Use Topics    Alcohol use: Not Currently     Allergies   Allergen Reactions    Latex      Other reaction(s): Unknown (comments)    Ibuprofen      Other reaction(s): Not Reported This Time    Macadamia Nut Oil Swelling    Milk (Cow)      Other reaction(s): Not Reported This Time    Shellfish Allergy      Other reaction(s): Unknown (comments)     Prior to Admission medications    Medication Sig Start Date End Date Taking?  Authorizing Provider   sertraline (ZOLOFT) 100 MG tablet Take 1 tablet by mouth daily 23   Manuela Cho MD   traZODone (DESYREL) 50 MG tablet Take 1 tablet by mouth nightly as needed for Sleep 23   Manuela Cho MD   prazosin

## 2023-09-18 ENCOUNTER — OFFICE VISIT (OUTPATIENT)
Facility: CLINIC | Age: 38
End: 2023-09-18
Payer: COMMERCIAL

## 2023-09-18 VITALS
HEIGHT: 76 IN | WEIGHT: 220.4 LBS | BODY MASS INDEX: 26.84 KG/M2 | HEART RATE: 89 BPM | DIASTOLIC BLOOD PRESSURE: 73 MMHG | SYSTOLIC BLOOD PRESSURE: 114 MMHG | TEMPERATURE: 98.4 F | RESPIRATION RATE: 18 BRPM | OXYGEN SATURATION: 98 %

## 2023-09-18 DIAGNOSIS — Z86.19 HISTORY OF HEPATITIS B: ICD-10-CM

## 2023-09-18 DIAGNOSIS — K70.30 ALCOHOLIC CIRRHOSIS OF LIVER WITHOUT ASCITES (HCC): Primary | ICD-10-CM

## 2023-09-18 DIAGNOSIS — F10.90 ALCOHOL USE DISORDER: ICD-10-CM

## 2023-09-18 DIAGNOSIS — M54.50 LOW BACK PAIN, UNSPECIFIED BACK PAIN LATERALITY, UNSPECIFIED CHRONICITY, UNSPECIFIED WHETHER SCIATICA PRESENT: ICD-10-CM

## 2023-09-18 DIAGNOSIS — Z13.220 SCREENING FOR HYPERLIPIDEMIA: ICD-10-CM

## 2023-09-18 DIAGNOSIS — Z11.4 SCREENING FOR HIV (HUMAN IMMUNODEFICIENCY VIRUS): ICD-10-CM

## 2023-09-18 DIAGNOSIS — R11.0 NAUSEA: ICD-10-CM

## 2023-09-18 DIAGNOSIS — J45.909 UNCOMPLICATED ASTHMA, UNSPECIFIED ASTHMA SEVERITY, UNSPECIFIED WHETHER PERSISTENT: ICD-10-CM

## 2023-09-18 DIAGNOSIS — K21.9 GASTROESOPHAGEAL REFLUX DISEASE, UNSPECIFIED WHETHER ESOPHAGITIS PRESENT: ICD-10-CM

## 2023-09-18 DIAGNOSIS — E03.9 HYPOTHYROIDISM, UNSPECIFIED TYPE: ICD-10-CM

## 2023-09-18 PROCEDURE — 99204 OFFICE O/P NEW MOD 45 MIN: CPT | Performed by: STUDENT IN AN ORGANIZED HEALTH CARE EDUCATION/TRAINING PROGRAM

## 2023-09-18 RX ORDER — TIZANIDINE 2 MG/1
2 TABLET ORAL 3 TIMES DAILY PRN
Qty: 30 TABLET | Refills: 0 | Status: SHIPPED | OUTPATIENT
Start: 2023-09-18

## 2023-09-18 RX ORDER — ALBUTEROL SULFATE 90 UG/1
2 AEROSOL, METERED RESPIRATORY (INHALATION) 4 TIMES DAILY PRN
Qty: 54 G | Refills: 1 | Status: SHIPPED | OUTPATIENT
Start: 2023-09-18

## 2023-09-18 RX ORDER — CLONIDINE HYDROCHLORIDE 0.1 MG/1
TABLET ORAL
COMMUNITY
Start: 2023-09-11

## 2023-09-18 RX ORDER — PANTOPRAZOLE SODIUM 40 MG/1
40 TABLET, DELAYED RELEASE ORAL
Qty: 30 TABLET | Refills: 2 | Status: SHIPPED | OUTPATIENT
Start: 2023-09-18

## 2023-09-18 RX ORDER — CLONAZEPAM 0.5 MG/1
TABLET ORAL
COMMUNITY
Start: 2023-09-11

## 2023-09-18 RX ORDER — RISPERIDONE 0.5 MG/1
0.5 TABLET ORAL DAILY
COMMUNITY
Start: 2023-09-11

## 2023-09-18 RX ORDER — ONDANSETRON 4 MG/1
4 TABLET, ORALLY DISINTEGRATING ORAL 3 TIMES DAILY PRN
Qty: 21 TABLET | Refills: 0 | Status: SHIPPED | OUTPATIENT
Start: 2023-09-18

## 2023-09-18 SDOH — ECONOMIC STABILITY: FOOD INSECURITY: WITHIN THE PAST 12 MONTHS, YOU WORRIED THAT YOUR FOOD WOULD RUN OUT BEFORE YOU GOT MONEY TO BUY MORE.: NEVER TRUE

## 2023-09-18 SDOH — ECONOMIC STABILITY: INCOME INSECURITY: HOW HARD IS IT FOR YOU TO PAY FOR THE VERY BASICS LIKE FOOD, HOUSING, MEDICAL CARE, AND HEATING?: NOT HARD AT ALL

## 2023-09-18 SDOH — ECONOMIC STABILITY: HOUSING INSECURITY
IN THE LAST 12 MONTHS, WAS THERE A TIME WHEN YOU DID NOT HAVE A STEADY PLACE TO SLEEP OR SLEPT IN A SHELTER (INCLUDING NOW)?: NO

## 2023-09-18 SDOH — ECONOMIC STABILITY: FOOD INSECURITY: WITHIN THE PAST 12 MONTHS, THE FOOD YOU BOUGHT JUST DIDN'T LAST AND YOU DIDN'T HAVE MONEY TO GET MORE.: NEVER TRUE

## 2023-09-18 ASSESSMENT — PATIENT HEALTH QUESTIONNAIRE - PHQ9
8. MOVING OR SPEAKING SO SLOWLY THAT OTHER PEOPLE COULD HAVE NOTICED. OR THE OPPOSITE, BEING SO FIGETY OR RESTLESS THAT YOU HAVE BEEN MOVING AROUND A LOT MORE THAN USUAL: 0
1. LITTLE INTEREST OR PLEASURE IN DOING THINGS: 0
SUM OF ALL RESPONSES TO PHQ QUESTIONS 1-9: 0
10. IF YOU CHECKED OFF ANY PROBLEMS, HOW DIFFICULT HAVE THESE PROBLEMS MADE IT FOR YOU TO DO YOUR WORK, TAKE CARE OF THINGS AT HOME, OR GET ALONG WITH OTHER PEOPLE: 0
7. TROUBLE CONCENTRATING ON THINGS, SUCH AS READING THE NEWSPAPER OR WATCHING TELEVISION: 0
SUM OF ALL RESPONSES TO PHQ9 QUESTIONS 1 & 2: 0
6. FEELING BAD ABOUT YOURSELF - OR THAT YOU ARE A FAILURE OR HAVE LET YOURSELF OR YOUR FAMILY DOWN: 0
9. THOUGHTS THAT YOU WOULD BE BETTER OFF DEAD, OR OF HURTING YOURSELF: 0
SUM OF ALL RESPONSES TO PHQ QUESTIONS 1-9: 0
2. FEELING DOWN, DEPRESSED OR HOPELESS: 0
3. TROUBLE FALLING OR STAYING ASLEEP: 0
4. FEELING TIRED OR HAVING LITTLE ENERGY: 0
5. POOR APPETITE OR OVEREATING: 0

## 2023-09-18 NOTE — PROGRESS NOTES
Renita Hitchcock (: 1985) is a 41 y.o. male, New patient patient, here for evaluation of the following chief complaint(s):  New Patient, Asthma, Gastroesophageal Reflux, and Back Pain       ASSESSMENT/PLAN:  Below is the assessment and plan developed based on review of pertinent history, physical exam, labs, studies, and medications. 1. Alcoholic cirrhosis of liver without ascites (HCC)  -     AFL - Carmela Pan MD, Gastroenterology, [facility]  2. History of hepatitis B  -     Hepatitis B Surface Antigen; Future  -     Hepatitis B Surface Antibody; Future  -     Hepatitis B Core Antibody, Total; Future  3. Uncomplicated asthma, unspecified asthma severity, unspecified whether persistent  -     albuterol sulfate HFA (VENTOLIN HFA) 108 (90 Base) MCG/ACT inhaler; Inhale 2 puffs into the lungs 4 times daily as needed for Wheezing or Shortness of Breath, Disp-54 g, R-1Normal  -     AFL - Emil Farfan MD, Pulmonology, [place]  4. Gastroesophageal reflux disease, unspecified whether esophagitis present  -     pantoprazole (PROTONIX) 40 MG tablet; Take 1 tablet by mouth every morning (before breakfast), Disp-30 tablet, R-2Normal  5. Screening for HIV (human immunodeficiency virus)  -     HIV 1/2 Ag/Ab, 4TH Generation,W Rflx Confirm; Future  6. Low back pain, unspecified back pain laterality, unspecified chronicity, unspecified whether sciatica present  -     Amb External Referral To Orthopedic Surgery  -     tiZANidine (ZANAFLEX) 2 MG tablet; Take 1 tablet by mouth 3 times daily as needed (back pain), Disp-30 tablet, R-0Normal  7. Screening for hyperlipidemia  -     Lipid Panel; Future  8. Hypothyroidism, unspecified type  -     TSH with Reflex to FT4; Future  9. Alcohol use disorder  -     Amb External Referral To Behavioral Health  10. Nausea  -     ondansetron (ZOFRAN-ODT) 4 MG disintegrating tablet;  Take 1 tablet by mouth 3 times daily as needed for Nausea or Vomiting, Disp-21 tablet,

## 2023-09-21 LAB
CHOLEST SERPL-MCNC: 204 MG/DL (ref 100–199)
HBV CORE AB SERPL QL IA: NEGATIVE
HBV SURFACE AB SER QL: REACTIVE
HBV SURFACE AG SERPL QL IA: NEGATIVE
HDLC SERPL-MCNC: 57 MG/DL
HIV 1+2 AB+HIV1 P24 AG SERPL QL IA: NON REACTIVE
LDLC SERPL CALC-MCNC: 131 MG/DL (ref 0–99)
TRIGL SERPL-MCNC: 90 MG/DL (ref 0–149)
TSH SERPL DL<=0.005 MIU/L-ACNC: 1.23 UIU/ML (ref 0.45–4.5)
VLDLC SERPL CALC-MCNC: 16 MG/DL (ref 5–40)

## 2023-09-24 ENCOUNTER — TELEPHONE (OUTPATIENT)
Facility: CLINIC | Age: 38
End: 2023-09-24

## 2023-09-24 ASSESSMENT — ENCOUNTER SYMPTOMS
BACK PAIN: 1
ABDOMINAL PAIN: 0
CONSTIPATION: 0
DIARRHEA: 0
SORE THROAT: 0
FACIAL SWELLING: 0
COUGH: 0
SHORTNESS OF BREATH: 0

## 2023-09-24 NOTE — TELEPHONE ENCOUNTER
Upon Review of previous PCP notes from 2019. Patient had a skin lesion over his right leg which was concerning, he was referred to dermatology. Can you call patient and ask if he ever followed up.   If not I will place referral.

## 2023-09-26 DIAGNOSIS — L98.9 SKIN LESION OF RIGHT LEG: Primary | ICD-10-CM

## 2023-09-30 ENCOUNTER — HOSPITAL ENCOUNTER (INPATIENT)
Facility: HOSPITAL | Age: 38
LOS: 3 days | Discharge: HOME | End: 2023-10-03
Attending: STUDENT IN AN ORGANIZED HEALTH CARE EDUCATION/TRAINING PROGRAM | Admitting: INTERNAL MEDICINE
Payer: COMMERCIAL

## 2023-09-30 ENCOUNTER — APPOINTMENT (OUTPATIENT)
Facility: HOSPITAL | Age: 38
End: 2023-09-30
Payer: COMMERCIAL

## 2023-09-30 DIAGNOSIS — K85.20 ALCOHOL-INDUCED ACUTE PANCREATITIS WITHOUT INFECTION OR NECROSIS: Primary | ICD-10-CM

## 2023-09-30 PROBLEM — K85.90 PANCREATITIS, UNSPECIFIED PANCREATITIS TYPE: Status: ACTIVE | Noted: 2023-09-30

## 2023-09-30 LAB
ALBUMIN SERPL-MCNC: 4.6 G/DL (ref 3.5–5)
ALBUMIN/GLOB SERPL: 1.2 (ref 1.1–2.2)
ALP SERPL-CCNC: 96 U/L (ref 45–117)
ALT SERPL-CCNC: 25 U/L (ref 12–78)
ANION GAP SERPL CALC-SCNC: 10 MMOL/L (ref 5–15)
APPEARANCE UR: CLEAR
AST SERPL W P-5'-P-CCNC: 22 U/L (ref 15–37)
BACTERIA URNS QL MICRO: NEGATIVE /HPF
BASOPHILS # BLD: 0.1 K/UL (ref 0–0.1)
BASOPHILS NFR BLD: 1 % (ref 0–1)
BILIRUB SERPL-MCNC: 0.4 MG/DL (ref 0.2–1)
BILIRUB UR QL: NEGATIVE
BUN SERPL-MCNC: 8 MG/DL (ref 6–20)
BUN/CREAT SERPL: 7 (ref 12–20)
CA-I BLD-MCNC: 8.9 MG/DL (ref 8.5–10.1)
CHLORIDE SERPL-SCNC: 104 MMOL/L (ref 97–108)
CO2 SERPL-SCNC: 23 MMOL/L (ref 21–32)
COLOR UR: ABNORMAL
CREAT SERPL-MCNC: 1.09 MG/DL (ref 0.7–1.3)
DIFFERENTIAL METHOD BLD: ABNORMAL
EOSINOPHIL # BLD: 0.2 K/UL (ref 0–0.4)
EOSINOPHIL NFR BLD: 2 % (ref 0–7)
EPITH CASTS URNS QL MICRO: ABNORMAL /LPF
ERYTHROCYTE [DISTWIDTH] IN BLOOD BY AUTOMATED COUNT: 13.1 % (ref 11.5–14.5)
GLOBULIN SER CALC-MCNC: 3.9 G/DL (ref 2–4)
GLUCOSE SERPL-MCNC: 84 MG/DL (ref 65–100)
GLUCOSE UR STRIP.AUTO-MCNC: NEGATIVE MG/DL
HCT VFR BLD AUTO: 42.6 % (ref 36.6–50.3)
HGB BLD-MCNC: 14.5 G/DL (ref 12.1–17)
HGB UR QL STRIP: ABNORMAL
IMM GRANULOCYTES # BLD AUTO: 0 K/UL
IMM GRANULOCYTES NFR BLD AUTO: 0 %
KETONES UR QL STRIP.AUTO: NEGATIVE MG/DL
LACTATE SERPL-SCNC: 2.2 MMOL/L (ref 0.4–2)
LACTATE SERPL-SCNC: 2.2 MMOL/L (ref 0.4–2)
LEUKOCYTE ESTERASE UR QL STRIP.AUTO: NEGATIVE
LIPASE SERPL-CCNC: 841 U/L (ref 73–393)
LYMPHOCYTES # BLD: 5.1 K/UL (ref 0.8–3.5)
LYMPHOCYTES NFR BLD: 50 % (ref 12–49)
MAGNESIUM SERPL-MCNC: 2.3 MG/DL (ref 1.6–2.4)
MCH RBC QN AUTO: 32.5 PG (ref 26–34)
MCHC RBC AUTO-ENTMCNC: 34 G/DL (ref 30–36.5)
MCV RBC AUTO: 95.5 FL (ref 80–99)
METAMYELOCYTES NFR BLD MANUAL: 1 %
MONOCYTES # BLD: 0.6 K/UL (ref 0–1)
MONOCYTES NFR BLD: 6 % (ref 5–13)
NEUTS SEG # BLD: 4 K/UL (ref 1.8–8)
NEUTS SEG NFR BLD: 40 % (ref 32–75)
NITRITE UR QL STRIP.AUTO: NEGATIVE
NRBC # BLD: 0 K/UL (ref 0–0.01)
NRBC BLD-RTO: 0 PER 100 WBC
PH UR STRIP: 6 (ref 5–8)
PLATELET # BLD AUTO: 236 K/UL (ref 150–400)
PMV BLD AUTO: 11.5 FL (ref 8.9–12.9)
POTASSIUM SERPL-SCNC: 3.3 MMOL/L (ref 3.5–5.1)
PROT SERPL-MCNC: 8.5 G/DL (ref 6.4–8.2)
PROT UR STRIP-MCNC: NEGATIVE MG/DL
RBC # BLD AUTO: 4.46 M/UL (ref 4.1–5.7)
RBC #/AREA URNS HPF: ABNORMAL /HPF (ref 0–5)
RBC MORPH BLD: ABNORMAL
SODIUM SERPL-SCNC: 137 MMOL/L (ref 136–145)
SP GR UR REFRACTOMETRY: <1.005 (ref 1–1.03)
URINE CULTURE IF INDICATED: ABNORMAL
UROBILINOGEN UR QL STRIP.AUTO: 0.1 EU/DL (ref 0.1–1)
WBC # BLD AUTO: 10.1 K/UL (ref 4.1–11.1)
WBC URNS QL MICRO: ABNORMAL /HPF (ref 0–4)

## 2023-09-30 PROCEDURE — 6370000000 HC RX 637 (ALT 250 FOR IP): Performed by: STUDENT IN AN ORGANIZED HEALTH CARE EDUCATION/TRAINING PROGRAM

## 2023-09-30 PROCEDURE — 83605 ASSAY OF LACTIC ACID: CPT

## 2023-09-30 PROCEDURE — 85025 COMPLETE CBC W/AUTO DIFF WBC: CPT

## 2023-09-30 PROCEDURE — 6360000004 HC RX CONTRAST MEDICATION: Performed by: STUDENT IN AN ORGANIZED HEALTH CARE EDUCATION/TRAINING PROGRAM

## 2023-09-30 PROCEDURE — 96361 HYDRATE IV INFUSION ADD-ON: CPT

## 2023-09-30 PROCEDURE — 80053 COMPREHEN METABOLIC PANEL: CPT

## 2023-09-30 PROCEDURE — 83690 ASSAY OF LIPASE: CPT

## 2023-09-30 PROCEDURE — 1100000000 HC RM PRIVATE

## 2023-09-30 PROCEDURE — 6360000002 HC RX W HCPCS: Performed by: STUDENT IN AN ORGANIZED HEALTH CARE EDUCATION/TRAINING PROGRAM

## 2023-09-30 PROCEDURE — 2580000003 HC RX 258: Performed by: STUDENT IN AN ORGANIZED HEALTH CARE EDUCATION/TRAINING PROGRAM

## 2023-09-30 PROCEDURE — 83735 ASSAY OF MAGNESIUM: CPT

## 2023-09-30 PROCEDURE — 96375 TX/PRO/DX INJ NEW DRUG ADDON: CPT

## 2023-09-30 PROCEDURE — 93005 ELECTROCARDIOGRAM TRACING: CPT | Performed by: EMERGENCY MEDICINE

## 2023-09-30 PROCEDURE — 71045 X-RAY EXAM CHEST 1 VIEW: CPT

## 2023-09-30 PROCEDURE — 2500000003 HC RX 250 WO HCPCS: Performed by: STUDENT IN AN ORGANIZED HEALTH CARE EDUCATION/TRAINING PROGRAM

## 2023-09-30 PROCEDURE — 36415 COLL VENOUS BLD VENIPUNCTURE: CPT

## 2023-09-30 PROCEDURE — 94640 AIRWAY INHALATION TREATMENT: CPT

## 2023-09-30 PROCEDURE — 74178 CT ABD&PLV WO CNTR FLWD CNTR: CPT

## 2023-09-30 PROCEDURE — 99285 EMERGENCY DEPT VISIT HI MDM: CPT

## 2023-09-30 PROCEDURE — 81001 URINALYSIS AUTO W/SCOPE: CPT

## 2023-09-30 PROCEDURE — 96374 THER/PROPH/DIAG INJ IV PUSH: CPT

## 2023-09-30 RX ORDER — PANTOPRAZOLE SODIUM 40 MG/1
40 TABLET, DELAYED RELEASE ORAL
Status: DISCONTINUED | OUTPATIENT
Start: 2023-10-01 | End: 2023-10-03 | Stop reason: HOSPADM

## 2023-09-30 RX ORDER — ONDANSETRON 2 MG/ML
4 INJECTION INTRAMUSCULAR; INTRAVENOUS ONCE
Status: COMPLETED | OUTPATIENT
Start: 2023-09-30 | End: 2023-09-30

## 2023-09-30 RX ORDER — IPRATROPIUM BROMIDE AND ALBUTEROL SULFATE 2.5; .5 MG/3ML; MG/3ML
1 SOLUTION RESPIRATORY (INHALATION) EVERY 4 HOURS PRN
Status: DISCONTINUED | OUTPATIENT
Start: 2023-09-30 | End: 2023-10-03 | Stop reason: HOSPADM

## 2023-09-30 RX ORDER — ACETAMINOPHEN 325 MG/1
650 TABLET ORAL EVERY 6 HOURS PRN
Status: DISCONTINUED | OUTPATIENT
Start: 2023-09-30 | End: 2023-10-03 | Stop reason: HOSPADM

## 2023-09-30 RX ORDER — TIZANIDINE 2 MG/1
2 TABLET ORAL 3 TIMES DAILY PRN
Status: DISCONTINUED | OUTPATIENT
Start: 2023-09-30 | End: 2023-10-03 | Stop reason: HOSPADM

## 2023-09-30 RX ORDER — NICOTINE 21 MG/24HR
1 PATCH, TRANSDERMAL 24 HOURS TRANSDERMAL DAILY
Status: DISCONTINUED | OUTPATIENT
Start: 2023-09-30 | End: 2023-10-03 | Stop reason: HOSPADM

## 2023-09-30 RX ORDER — ONDANSETRON 2 MG/ML
4 INJECTION INTRAMUSCULAR; INTRAVENOUS EVERY 6 HOURS PRN
Status: DISCONTINUED | OUTPATIENT
Start: 2023-09-30 | End: 2023-10-03 | Stop reason: HOSPADM

## 2023-09-30 RX ORDER — ONDANSETRON 4 MG/1
4 TABLET, ORALLY DISINTEGRATING ORAL EVERY 8 HOURS PRN
Status: DISCONTINUED | OUTPATIENT
Start: 2023-09-30 | End: 2023-10-03 | Stop reason: HOSPADM

## 2023-09-30 RX ORDER — 0.9 % SODIUM CHLORIDE 0.9 %
1000 INTRAVENOUS SOLUTION INTRAVENOUS ONCE
Status: COMPLETED | OUTPATIENT
Start: 2023-09-30 | End: 2023-09-30

## 2023-09-30 RX ORDER — ENOXAPARIN SODIUM 100 MG/ML
40 INJECTION SUBCUTANEOUS DAILY
Status: DISCONTINUED | OUTPATIENT
Start: 2023-09-30 | End: 2023-10-03 | Stop reason: HOSPADM

## 2023-09-30 RX ORDER — IPRATROPIUM BROMIDE AND ALBUTEROL SULFATE 2.5; .5 MG/3ML; MG/3ML
1 SOLUTION RESPIRATORY (INHALATION)
Status: COMPLETED | OUTPATIENT
Start: 2023-09-30 | End: 2023-09-30

## 2023-09-30 RX ORDER — RISPERIDONE 0.25 MG/1
0.5 TABLET ORAL DAILY
Status: DISCONTINUED | OUTPATIENT
Start: 2023-10-01 | End: 2023-10-03 | Stop reason: HOSPADM

## 2023-09-30 RX ORDER — ACETAMINOPHEN 650 MG/1
650 SUPPOSITORY RECTAL EVERY 6 HOURS PRN
Status: DISCONTINUED | OUTPATIENT
Start: 2023-09-30 | End: 2023-10-03 | Stop reason: HOSPADM

## 2023-09-30 RX ORDER — SODIUM CHLORIDE 9 MG/ML
INJECTION, SOLUTION INTRAVENOUS PRN
Status: DISCONTINUED | OUTPATIENT
Start: 2023-09-30 | End: 2023-10-03 | Stop reason: HOSPADM

## 2023-09-30 RX ORDER — CLONAZEPAM 0.5 MG/1
0.5 TABLET ORAL EVERY 12 HOURS PRN
Status: DISCONTINUED | OUTPATIENT
Start: 2023-09-30 | End: 2023-10-03 | Stop reason: HOSPADM

## 2023-09-30 RX ORDER — OLANZAPINE 10 MG/1
20 TABLET ORAL NIGHTLY
Status: DISCONTINUED | OUTPATIENT
Start: 2023-10-01 | End: 2023-10-03 | Stop reason: HOSPADM

## 2023-09-30 RX ORDER — SODIUM CHLORIDE 0.9 % (FLUSH) 0.9 %
5-40 SYRINGE (ML) INJECTION PRN
Status: DISCONTINUED | OUTPATIENT
Start: 2023-09-30 | End: 2023-10-03 | Stop reason: HOSPADM

## 2023-09-30 RX ORDER — SODIUM CHLORIDE 9 MG/ML
INJECTION, SOLUTION INTRAVENOUS CONTINUOUS
Status: DISCONTINUED | OUTPATIENT
Start: 2023-09-30 | End: 2023-10-01

## 2023-09-30 RX ORDER — HYDROMORPHONE HYDROCHLORIDE 1 MG/ML
0.5 INJECTION, SOLUTION INTRAMUSCULAR; INTRAVENOUS; SUBCUTANEOUS
Status: COMPLETED | OUTPATIENT
Start: 2023-09-30 | End: 2023-09-30

## 2023-09-30 RX ORDER — POLYETHYLENE GLYCOL 3350 17 G/17G
17 POWDER, FOR SOLUTION ORAL DAILY PRN
Status: DISCONTINUED | OUTPATIENT
Start: 2023-09-30 | End: 2023-10-03 | Stop reason: HOSPADM

## 2023-09-30 RX ORDER — CLONIDINE HYDROCHLORIDE 0.1 MG/1
0.1 TABLET ORAL 3 TIMES DAILY
Status: DISCONTINUED | OUTPATIENT
Start: 2023-09-30 | End: 2023-10-03 | Stop reason: HOSPADM

## 2023-09-30 RX ORDER — SODIUM CHLORIDE 0.9 % (FLUSH) 0.9 %
5-40 SYRINGE (ML) INJECTION EVERY 12 HOURS SCHEDULED
Status: DISCONTINUED | OUTPATIENT
Start: 2023-09-30 | End: 2023-10-03 | Stop reason: HOSPADM

## 2023-09-30 RX ADMIN — SODIUM CHLORIDE: 9 INJECTION, SOLUTION INTRAVENOUS at 22:43

## 2023-09-30 RX ADMIN — SODIUM CHLORIDE, PRESERVATIVE FREE 10 ML: 5 INJECTION INTRAVENOUS at 22:48

## 2023-09-30 RX ADMIN — IPRATROPIUM BROMIDE AND ALBUTEROL SULFATE 1 DOSE: 2.5; .5 SOLUTION RESPIRATORY (INHALATION) at 21:09

## 2023-09-30 RX ADMIN — CLONIDINE HYDROCHLORIDE 0.1 MG: 0.1 TABLET ORAL at 22:43

## 2023-09-30 RX ADMIN — SODIUM CHLORIDE 1000 ML: 9 INJECTION, SOLUTION INTRAVENOUS at 16:06

## 2023-09-30 RX ADMIN — HYDROMORPHONE HYDROCHLORIDE 0.5 MG: 1 INJECTION, SOLUTION INTRAMUSCULAR; INTRAVENOUS; SUBCUTANEOUS at 16:07

## 2023-09-30 RX ADMIN — IOPAMIDOL 100 ML: 755 INJECTION, SOLUTION INTRAVENOUS at 16:51

## 2023-09-30 RX ADMIN — ONDANSETRON 4 MG: 2 INJECTION INTRAMUSCULAR; INTRAVENOUS at 16:08

## 2023-09-30 ASSESSMENT — ENCOUNTER SYMPTOMS
RHINORRHEA: 0
BACK PAIN: 0
EYES NEGATIVE: 1
ABDOMINAL PAIN: 1
DIARRHEA: 0
SHORTNESS OF BREATH: 1
NAUSEA: 1
ALLERGIC/IMMUNOLOGIC NEGATIVE: 1
SORE THROAT: 0
VOMITING: 1
TROUBLE SWALLOWING: 0
COUGH: 1
WHEEZING: 1

## 2023-09-30 ASSESSMENT — PAIN DESCRIPTION - LOCATION
LOCATION: BACK
LOCATION: ABDOMEN;BACK

## 2023-09-30 ASSESSMENT — PAIN DESCRIPTION - ORIENTATION: ORIENTATION: RIGHT;LEFT;UPPER

## 2023-09-30 ASSESSMENT — PAIN DESCRIPTION - DESCRIPTORS: DESCRIPTORS: ACHING;DISCOMFORT

## 2023-09-30 ASSESSMENT — PAIN - FUNCTIONAL ASSESSMENT: PAIN_FUNCTIONAL_ASSESSMENT: 0-10

## 2023-09-30 ASSESSMENT — PAIN SCALES - GENERAL
PAINLEVEL_OUTOF10: 4
PAINLEVEL_OUTOF10: 8

## 2023-09-30 NOTE — ED TRIAGE NOTES
Present with c/o blood in vomit, fatigue, and loss of appetite. Has hx of cirrohisis of liver. Pt states that he has been drinking alcohol lately.

## 2023-09-30 NOTE — H&P
Hospitalist Admission Note    NAME: Aide Shanks. :  1985   MRN:  [id number]     Date/Time:  2023 7:10 PM    Patient PCP: Loli Acosta MD    ______________________________________________________________________  Given the patient's current clinical presentation, I have a high level of concern for decompensation if discharged from the emergency department. Complex decision making was performed, which includes reviewing the patient's available past medical records, laboratory results, and x-ray films. My assessment of this patient's clinical condition and my plan of care is as follows. Assessment / Plan:  Pancreatitis  -Lipase 841, repeat  -IV fluids  -N.p.o.  -GI consult when available    Hemoptysis with history of Everett's esophagus  -CT of the abdomen pelvis with and without contrast revealed no evidence for active bleed within the colon small or large intestine. Nonspecific nodular gastric mucosa thickening around the esophageal orifice measuring up to 2.9 x 1.4 cm. No suspicious liver lesions or suggestion of portal hypertension.  -Continue Protonix  -GI consult when available    Anxiety, depression and bipolar  -Continue clonidine, olanzapine risperidone and Zoloft  -Klonopin as needed for panic attacks    Asthma  -CXR revealed no acute findings  -DuoNebs as needed    Tobacco dependency  -Nicotine patch  -Discussed and educated patient on importance of cessation    History of chronic lower back pain  -Zanaflex as needed    Medical Decision Making:   I personally reviewed labs: CBC CMP UA lipase  I personally reviewed imaging: CXR, CT of the abdomen pelvis with and without contrast  Toxic drug monitoring: Lovenox for any signs of bleeding  Discussed case with: ED provider. After discussion I am in agreement that acuity of patient's medical condition necessitates hospital stay.       Code Status: Full  DVT Prophylaxis: Lovenox  GI Prophylaxis: Protonix      Subjective:

## 2023-10-01 LAB
ALBUMIN SERPL-MCNC: 4.2 G/DL (ref 3.5–5)
ALBUMIN/GLOB SERPL: 1.2 (ref 1.1–2.2)
ALP SERPL-CCNC: 89 U/L (ref 45–117)
ALT SERPL-CCNC: 39 U/L (ref 12–78)
ANION GAP SERPL CALC-SCNC: 14 MMOL/L (ref 5–15)
AST SERPL W P-5'-P-CCNC: 59 U/L (ref 15–37)
BASOPHILS # BLD: 0 K/UL (ref 0–0.1)
BASOPHILS NFR BLD: 1 % (ref 0–1)
BILIRUB SERPL-MCNC: 0.8 MG/DL (ref 0.2–1)
BUN SERPL-MCNC: 11 MG/DL (ref 6–20)
BUN/CREAT SERPL: 12 (ref 12–20)
CA-I BLD-MCNC: 8.9 MG/DL (ref 8.5–10.1)
CHLORIDE SERPL-SCNC: 106 MMOL/L (ref 97–108)
CO2 SERPL-SCNC: 17 MMOL/L (ref 21–32)
CREAT SERPL-MCNC: 0.92 MG/DL (ref 0.7–1.3)
DIFFERENTIAL METHOD BLD: ABNORMAL
EKG ATRIAL RATE: 69 BPM
EKG DIAGNOSIS: NORMAL
EKG P AXIS: 53 DEGREES
EKG P-R INTERVAL: 140 MS
EKG Q-T INTERVAL: 416 MS
EKG QRS DURATION: 90 MS
EKG QTC CALCULATION (BAZETT): 445 MS
EKG R AXIS: -25 DEGREES
EKG T AXIS: 3 DEGREES
EKG VENTRICULAR RATE: 69 BPM
EOSINOPHIL # BLD: 0 K/UL (ref 0–0.4)
EOSINOPHIL NFR BLD: 0 % (ref 0–7)
ERYTHROCYTE [DISTWIDTH] IN BLOOD BY AUTOMATED COUNT: 13.4 % (ref 11.5–14.5)
GLOBULIN SER CALC-MCNC: 3.6 G/DL (ref 2–4)
GLUCOSE SERPL-MCNC: 71 MG/DL (ref 65–100)
HCT VFR BLD AUTO: 41.6 % (ref 36.6–50.3)
HGB BLD-MCNC: 13.3 G/DL (ref 12.1–17)
IMM GRANULOCYTES # BLD AUTO: 0 K/UL (ref 0–0.04)
IMM GRANULOCYTES NFR BLD AUTO: 0 % (ref 0–0.5)
LIPASE SERPL-CCNC: 98 U/L (ref 73–393)
LYMPHOCYTES # BLD: 1.6 K/UL (ref 0.8–3.5)
LYMPHOCYTES NFR BLD: 19 % (ref 12–49)
MCH RBC QN AUTO: 32.3 PG (ref 26–34)
MCHC RBC AUTO-ENTMCNC: 32 G/DL (ref 30–36.5)
MCV RBC AUTO: 101 FL (ref 80–99)
MONOCYTES # BLD: 0.8 K/UL (ref 0–1)
MONOCYTES NFR BLD: 10 % (ref 5–13)
NEUTS SEG # BLD: 5.9 K/UL (ref 1.8–8)
NEUTS SEG NFR BLD: 70 % (ref 32–75)
NRBC # BLD: 0 K/UL (ref 0–0.01)
NRBC BLD-RTO: 0 PER 100 WBC
PLATELET # BLD AUTO: 192 K/UL (ref 150–400)
PMV BLD AUTO: 12 FL (ref 8.9–12.9)
POTASSIUM SERPL-SCNC: 4.2 MMOL/L (ref 3.5–5.1)
PROT SERPL-MCNC: 7.8 G/DL (ref 6.4–8.2)
RBC # BLD AUTO: 4.12 M/UL (ref 4.1–5.7)
SODIUM SERPL-SCNC: 137 MMOL/L (ref 136–145)
WBC # BLD AUTO: 8.3 K/UL (ref 4.1–11.1)

## 2023-10-01 PROCEDURE — 2580000003 HC RX 258: Performed by: STUDENT IN AN ORGANIZED HEALTH CARE EDUCATION/TRAINING PROGRAM

## 2023-10-01 PROCEDURE — 6360000002 HC RX W HCPCS: Performed by: STUDENT IN AN ORGANIZED HEALTH CARE EDUCATION/TRAINING PROGRAM

## 2023-10-01 PROCEDURE — 80053 COMPREHEN METABOLIC PANEL: CPT

## 2023-10-01 PROCEDURE — 1100000000 HC RM PRIVATE

## 2023-10-01 PROCEDURE — 36415 COLL VENOUS BLD VENIPUNCTURE: CPT

## 2023-10-01 PROCEDURE — 83690 ASSAY OF LIPASE: CPT

## 2023-10-01 PROCEDURE — 85025 COMPLETE CBC W/AUTO DIFF WBC: CPT

## 2023-10-01 PROCEDURE — 6370000000 HC RX 637 (ALT 250 FOR IP): Performed by: STUDENT IN AN ORGANIZED HEALTH CARE EDUCATION/TRAINING PROGRAM

## 2023-10-01 RX ORDER — MORPHINE SULFATE 2 MG/ML
2 INJECTION, SOLUTION INTRAMUSCULAR; INTRAVENOUS EVERY 4 HOURS PRN
Status: DISPENSED | OUTPATIENT
Start: 2023-10-01 | End: 2023-10-03

## 2023-10-01 RX ADMIN — OLANZAPINE 20 MG: 10 TABLET, FILM COATED ORAL at 20:36

## 2023-10-01 RX ADMIN — CLONIDINE HYDROCHLORIDE 0.1 MG: 0.1 TABLET ORAL at 20:36

## 2023-10-01 RX ADMIN — SODIUM CHLORIDE, PRESERVATIVE FREE 10 ML: 5 INJECTION INTRAVENOUS at 20:38

## 2023-10-01 RX ADMIN — MORPHINE SULFATE 2 MG: 2 INJECTION, SOLUTION INTRAMUSCULAR; INTRAVENOUS at 16:10

## 2023-10-01 RX ADMIN — RISPERIDONE 0.5 MG: 1 TABLET ORAL at 08:22

## 2023-10-01 RX ADMIN — MORPHINE SULFATE 2 MG: 2 INJECTION, SOLUTION INTRAMUSCULAR; INTRAVENOUS at 11:14

## 2023-10-01 RX ADMIN — SODIUM CHLORIDE, PRESERVATIVE FREE 10 ML: 5 INJECTION INTRAVENOUS at 08:21

## 2023-10-01 RX ADMIN — SODIUM CHLORIDE: 9 INJECTION, SOLUTION INTRAVENOUS at 06:38

## 2023-10-01 RX ADMIN — SERTRALINE HYDROCHLORIDE 100 MG: 25 TABLET ORAL at 08:21

## 2023-10-01 RX ADMIN — ONDANSETRON 4 MG: 4 TABLET, ORALLY DISINTEGRATING ORAL at 05:43

## 2023-10-01 RX ADMIN — PANTOPRAZOLE SODIUM 40 MG: 40 TABLET, DELAYED RELEASE ORAL at 05:43

## 2023-10-01 ASSESSMENT — ENCOUNTER SYMPTOMS
NAUSEA: 0
WHEEZING: 1
ABDOMINAL PAIN: 1
DIARRHEA: 0
BACK PAIN: 0
TROUBLE SWALLOWING: 0
ALLERGIC/IMMUNOLOGIC NEGATIVE: 1
RHINORRHEA: 0
COUGH: 0
SHORTNESS OF BREATH: 0
SORE THROAT: 0
EYES NEGATIVE: 1
VOMITING: 0

## 2023-10-01 ASSESSMENT — PAIN DESCRIPTION - LOCATION
LOCATION: ABDOMEN
LOCATION: ABDOMEN

## 2023-10-01 ASSESSMENT — PAIN SCALES - GENERAL
PAINLEVEL_OUTOF10: 0
PAINLEVEL_OUTOF10: 7
PAINLEVEL_OUTOF10: 0
PAINLEVEL_OUTOF10: 7
PAINLEVEL_OUTOF10: 7
PAINLEVEL_OUTOF10: 0

## 2023-10-01 NOTE — PLAN OF CARE
Problem: Discharge Planning  Goal: Discharge to home or other facility with appropriate resources  Outcome: Progressing  Flowsheets (Taken 9/30/2023 2200)  Discharge to home or other facility with appropriate resources: Identify barriers to discharge with patient and caregiver     Problem: Pain  Goal: Verbalizes/displays adequate comfort level or baseline comfort level  Outcome: Progressing     Problem: Safety - Adult  Goal: Free from fall injury  Outcome: Progressing

## 2023-10-01 NOTE — PROGRESS NOTES
4 [facility] Skin Assessment     NAME:  Cleo Diaz. YOB: 1985  MEDICAL RECORD NUMBER:  [id number]    The patient is being assessed for  Admission    I agree that at least one RN has performed a thorough Head to Toe Skin Assessment on the patient. ALL assessment sites listed below have been assessed. Areas assessed by both nurses:    Head, Face, Ears, Shoulders, Back, Chest, Arms, Elbows, Hands, Sacrum. Buttock, Coccyx, Ischium, and Legs. Feet and Heels        Does the Patient have a Wound?  No noted wound(s); noted old dark spots on the anterior and posterior chest.       Esth Prevention initiated by RN: Yes  Wound Care Orders initiated by RN: No    Pressure Injury (Stage 3,4, Unstageable, DTI, NWPT, and Complex wounds) if present, place Wound referral order by RN under : No    New Ostomies, if present place, Ostomy referral order under : No     Nurse 1 eSignature: Electronically signed by Charles Claude, RN on 10/1/23 at 00:10 AM EDT    **SHARE this note so that the co-signing nurse can place an eSignature**    Nurse 2 eSignature: Electronically signed by Kanu Light RN on 10/1/23 at 5:00 AM EDT

## 2023-10-01 NOTE — PROGRESS NOTES
Hospitalist Progress Note    NAME:   Arline Eubanks. : 1985   MRN: [id number]     Date/Time: 10/1/2023 2:16 PM  Patient PCP: Everett Hennessy MD    Estimated discharge date:24 hours  Barriers: GI consult and tolerate p.o. diet    Hospital course:  Patient is a 61-year-old male with a past medical history significant for anxiety, depression, asthma, bipolar 1, Everett's esophagus and GERD who was admitted on 2023 for pancreatitis and hemoptysis. Initial lipase 841. CT of the abdomen pelvis with and without contrast revealed no evidence for active bleed within the colon small or large intestine. Nonspecific nodular gastric mucosa thickening around the esophageal orifice measuring up to 2.9 x 1.4 cm. No suspicious liver lesions or suggestion of portal hypertension. Lipase improved to 98 as of this morning. Will advance patient's diet to clear liquids. GI consult pending. Assessment / Plan:  Pancreatitis  -Lipase 841>98  -IV fluids  -Clear liquids  -GI consult pending     Hemoptysis with history of Everett's esophagus  -CT of the abdomen pelvis with and without contrast revealed no evidence for active bleed within the colon small or large intestine. Nonspecific nodular gastric mucosa thickening around the esophageal orifice measuring up to 2.9 x 1.4 cm.   No suspicious liver lesions or suggestion of portal hypertension.  -Continue Protonix  -GI consult pending     Anxiety, depression and bipolar  -Continue clonidine, olanzapine risperidone and Zoloft  -Klonopin as needed for panic attacks     Asthma  -CXR revealed no acute findings  -DuoNebs as needed     Tobacco dependency  -Nicotine patch  -Discussed and educated patient on importance of cessation     History of chronic lower back pain  -Zanaflex as needed      Medical Decision Making:   I personally reviewed labs: CBC CMP lipase  I personally reviewed imaging: None  Toxic drug monitoring: Lovenox for any signs of bleeding, monitor

## 2023-10-01 NOTE — CARE COORDINATION
10/01/23 1345   Service Assessment   Patient Orientation Alert and Oriented   Cognition Alert   History Provided By Patient   Primary 90Susannah Garzon Parent   Patient's Healthcare Decision Maker is: Legal Next of Kin   PCP Verified by CM Yes   Last Visit to PCP   (18 Sep 2023)   Prior Functional Level Independent in ADLs/IADLs   Current Functional Level Independent in ADLs/IADLs   Can patient return to prior living arrangement Yes   Ability to make needs known: Good   Family able to assist with home care needs: Yes   Would you like for me to discuss the discharge plan with any other family members/significant others, and if so, who? Yes   Financial Resources [employer] None       Patient sitting up in the bed w/mother at the bedside. Last seen at listed PCP 18 Sep 23. Scheduled to see PCP 18 Mar 2024. Informed d/t inpatient admission will need to see his PCP prior to the appointment (3/18/2024). Patient and mother both acknowledged understanding. Patient is also being followed by Dr. Emily Haley (psychiatry), and sees her every three mos. Lives w/his mother in a two story home w/stairs. Self reports \"trouble sometimes going up and down the stairs. \"  Independent w/all ADL's & IADL's and requires no assistance at this given time. No home O2 or DME. No prior HH or IRF. Insurance doesn't offer a SNF benefit. FULL Code. LNOK: Mother, Dilma Caldwell @ [phone]. Starr Rajput @ [phone]. Family to transport home at time of discharge.        Marylee Infield, MSW

## 2023-10-01 NOTE — ED PROVIDER NOTES
TID Justine Breen PA-C   0.1 mg at 09/30/23 2243    [START ON 10/1/2023] OLANZapine (ZYPREXA) tablet 20 mg  20 mg Oral Nightly Justine Breen PA-C        [START ON 10/1/2023] pantoprazole (PROTONIX) tablet 40 mg  40 mg Oral QAM AC Justine Breen [place]        [START ON 10/1/2023] risperiDONE (RISPERDAL) tablet 0.5 mg  0.5 mg Oral Daily Jc Griffithada        [START ON 10/1/2023] sertraline (ZOLOFT) tablet 100 mg  100 mg Oral Daily Justine Breen PA-C        tiZANidine (ZANAFLEX) tablet 2 mg  2 mg Oral TID PRN Justine Breen PA-C        nicotine (NICODERM CQ) 14 MG/24HR 1 patch  1 patch TransDERmal Daily Justine Breen PA-C   1 patch at 09/30/23 2243       Social Determinants of Health:   Social Determinants of Health     Tobacco Use: High Risk (9/18/2023)    Patient History     Smoking Tobacco Use: Every Day     Smokeless Tobacco Use: Never     Passive Exposure: Not on file   Alcohol Use: Not At Risk (9/30/2023)    AUDIT-C     Frequency of Alcohol Consumption: Never     Average Number of Drinks: Patient does not drink     Frequency of Binge Drinking: Never   Financial Resource Strain: Low Risk  (9/18/2023)    Overall Financial Resource Strain (CARDIA)     Difficulty of Paying Living Expenses: Not hard at all   Food Insecurity: No Food Insecurity (9/18/2023)    Hunger Vital Sign     Worried About Running Out of Food in the Last Year: Never true     Ran Out of Food in the Last Year: Never true   Transportation Needs: Unknown (9/18/2023)    PRAPARE - Transportation     Lack of Transportation (Medical): Not on file     Lack of Transportation (Non-Medical):  No   Physical Activity: Not on file   Stress: Not on file   Social Connections: Not on file   Intimate Partner Violence: Not on file   Depression: Not at risk (9/18/2023)    PHQ-2     PHQ-2 Score: 0   Housing Stability: Unknown (9/18/2023)    Housing Stability Vital Sign     Unable to Pay for Housing in the Last Year: Not on file     Number of Places Lived in the time range)     Or   acetaminophen (TYLENOL) suppository 650 mg (has no administration in time range)   0.9 % sodium chloride infusion ( IntraVENous New Bag 9/30/23 2243)   ipratropium 0.5 mg-albuterol 2.5 mg (DUONEB) nebulizer solution 1 Dose (has no administration in time range)   clonazePAM (KLONOPIN) tablet 0.5 mg (has no administration in time range)   cloNIDine (CATAPRES) tablet 0.1 mg (0.1 mg Oral Given 9/30/23 2243)   OLANZapine (ZYPREXA) tablet 20 mg (has no administration in time range)   pantoprazole (PROTONIX) tablet 40 mg (has no administration in time range)   risperiDONE (RISPERDAL) tablet 0.5 mg (has no administration in time range)   sertraline (ZOLOFT) tablet 100 mg (has no administration in time range)   tiZANidine (ZANAFLEX) tablet 2 mg (has no administration in time range)   nicotine (NICODERM CQ) 14 MG/24HR 1 patch (1 patch TransDERmal Patch Applied 9/30/23 2243)   sodium chloride 0.9 % bolus 1,000 mL (0 mLs IntraVENous Stopped 9/30/23 1844)   ondansetron (ZOFRAN) injection 4 mg (4 mg IntraVENous Given 9/30/23 1608)   HYDROmorphone HCl PF (DILAUDID) injection 0.5 mg (0.5 mg IntraVENous Given 9/30/23 1607)   iopamidol (ISOVUE-370) 76 % injection 100 mL (100 mLs IntraVENous Given 9/30/23 1651)   ipratropium 0.5 mg-albuterol 2.5 mg (DUONEB) nebulizer solution 1 Dose (1 Dose Inhalation Given 9/30/23 2109)       CONSULTS: (Who and What was discussed)  None     Social Determinants affecting Dx or Tx: Patient has a substance abuse problem. Smoking Cessation: Not Applicable    PROCEDURES   Unless otherwise noted above, none  Procedures      CRITICAL CARE TIME   Patient does not meet Critical Care Time, 0 minutes    ED FINAL IMPRESSION     1. Alcohol-induced acute pancreatitis without infection or necrosis          DISPOSITION/PLAN   DISPOSITION Admitted 09/30/2023 06:41:46 PM    Admit Note: Pt is being admitted by [facility].  The results of their tests and reason(s) for their admission have

## 2023-10-02 LAB
ANION GAP SERPL CALC-SCNC: 7 MMOL/L (ref 5–15)
BASOPHILS # BLD: 0 K/UL (ref 0–0.1)
BASOPHILS NFR BLD: 0 % (ref 0–1)
BUN SERPL-MCNC: 8 MG/DL (ref 6–20)
BUN/CREAT SERPL: 8 (ref 12–20)
CA-I BLD-MCNC: 8.6 MG/DL (ref 8.5–10.1)
CHLORIDE SERPL-SCNC: 107 MMOL/L (ref 97–108)
CO2 SERPL-SCNC: 24 MMOL/L (ref 21–32)
CREAT SERPL-MCNC: 1.06 MG/DL (ref 0.7–1.3)
DIFFERENTIAL METHOD BLD: ABNORMAL
EOSINOPHIL # BLD: 0.1 K/UL (ref 0–0.4)
EOSINOPHIL NFR BLD: 2 % (ref 0–7)
ERYTHROCYTE [DISTWIDTH] IN BLOOD BY AUTOMATED COUNT: 13.2 % (ref 11.5–14.5)
FOLATE SERPL-MCNC: 4.5 NG/ML (ref 5–21)
GLUCOSE SERPL-MCNC: 82 MG/DL (ref 65–100)
HCT VFR BLD AUTO: 37.1 % (ref 36.6–50.3)
HGB BLD-MCNC: 12.1 G/DL (ref 12.1–17)
IMM GRANULOCYTES # BLD AUTO: 0 K/UL (ref 0–0.04)
IMM GRANULOCYTES NFR BLD AUTO: 0 % (ref 0–0.5)
LIPASE SERPL-CCNC: 107 U/L (ref 73–393)
LYMPHOCYTES # BLD: 2.7 K/UL (ref 0.8–3.5)
LYMPHOCYTES NFR BLD: 37 % (ref 12–49)
MCH RBC QN AUTO: 32.3 PG (ref 26–34)
MCHC RBC AUTO-ENTMCNC: 32.6 G/DL (ref 30–36.5)
MCV RBC AUTO: 98.9 FL (ref 80–99)
MONOCYTES # BLD: 1 K/UL (ref 0–1)
MONOCYTES NFR BLD: 15 % (ref 5–13)
NEUTS SEG # BLD: 3.3 K/UL (ref 1.8–8)
NEUTS SEG NFR BLD: 46 % (ref 32–75)
NRBC # BLD: 0 K/UL (ref 0–0.01)
NRBC BLD-RTO: 0 PER 100 WBC
PLATELET # BLD AUTO: 160 K/UL (ref 150–400)
PMV BLD AUTO: 11.9 FL (ref 8.9–12.9)
POTASSIUM SERPL-SCNC: 3.6 MMOL/L (ref 3.5–5.1)
RBC # BLD AUTO: 3.75 M/UL (ref 4.1–5.7)
SODIUM SERPL-SCNC: 138 MMOL/L (ref 136–145)
VIT B12 SERPL-MCNC: 629 PG/ML (ref 193–986)
WBC # BLD AUTO: 7.2 K/UL (ref 4.1–11.1)

## 2023-10-02 PROCEDURE — 36415 COLL VENOUS BLD VENIPUNCTURE: CPT

## 2023-10-02 PROCEDURE — 6370000000 HC RX 637 (ALT 250 FOR IP): Performed by: STUDENT IN AN ORGANIZED HEALTH CARE EDUCATION/TRAINING PROGRAM

## 2023-10-02 PROCEDURE — 85025 COMPLETE CBC W/AUTO DIFF WBC: CPT

## 2023-10-02 PROCEDURE — 82607 VITAMIN B-12: CPT

## 2023-10-02 PROCEDURE — 80048 BASIC METABOLIC PNL TOTAL CA: CPT

## 2023-10-02 PROCEDURE — 2580000003 HC RX 258: Performed by: STUDENT IN AN ORGANIZED HEALTH CARE EDUCATION/TRAINING PROGRAM

## 2023-10-02 PROCEDURE — 82746 ASSAY OF FOLIC ACID SERUM: CPT

## 2023-10-02 PROCEDURE — 1100000000 HC RM PRIVATE

## 2023-10-02 PROCEDURE — 83690 ASSAY OF LIPASE: CPT

## 2023-10-02 PROCEDURE — 6360000002 HC RX W HCPCS: Performed by: STUDENT IN AN ORGANIZED HEALTH CARE EDUCATION/TRAINING PROGRAM

## 2023-10-02 RX ORDER — GAUZE BANDAGE 2" X 2"
100 BANDAGE TOPICAL DAILY
Status: DISCONTINUED | OUTPATIENT
Start: 2023-10-02 | End: 2023-10-03 | Stop reason: HOSPADM

## 2023-10-02 RX ORDER — FOLIC ACID 1 MG/1
1 TABLET ORAL DAILY
Status: DISCONTINUED | OUTPATIENT
Start: 2023-10-02 | End: 2023-10-03 | Stop reason: HOSPADM

## 2023-10-02 RX ADMIN — THIAMINE HCL TAB 100 MG 100 MG: 100 TAB at 14:24

## 2023-10-02 RX ADMIN — RISPERIDONE 0.5 MG: 1 TABLET ORAL at 09:34

## 2023-10-02 RX ADMIN — FOLIC ACID 1 MG: 1 TABLET ORAL at 14:24

## 2023-10-02 RX ADMIN — SODIUM CHLORIDE, PRESERVATIVE FREE 10 ML: 5 INJECTION INTRAVENOUS at 09:37

## 2023-10-02 RX ADMIN — PANTOPRAZOLE SODIUM 40 MG: 40 TABLET, DELAYED RELEASE ORAL at 06:00

## 2023-10-02 RX ADMIN — SODIUM CHLORIDE, PRESERVATIVE FREE 10 ML: 5 INJECTION INTRAVENOUS at 20:14

## 2023-10-02 RX ADMIN — ENOXAPARIN SODIUM 40 MG: 100 INJECTION SUBCUTANEOUS at 09:35

## 2023-10-02 RX ADMIN — CLONIDINE HYDROCHLORIDE 0.1 MG: 0.1 TABLET ORAL at 20:14

## 2023-10-02 RX ADMIN — SERTRALINE HYDROCHLORIDE 100 MG: 25 TABLET ORAL at 09:34

## 2023-10-02 RX ADMIN — OLANZAPINE 20 MG: 10 TABLET, FILM COATED ORAL at 20:14

## 2023-10-02 ASSESSMENT — ENCOUNTER SYMPTOMS
COUGH: 0
CONSTIPATION: 0
DIARRHEA: 0
ABDOMINAL PAIN: 1
NAUSEA: 0
SORE THROAT: 0
SHORTNESS OF BREATH: 0
BACK PAIN: 0
WHEEZING: 0

## 2023-10-02 NOTE — PROGRESS NOTES
Comprehensive Nutrition Assessment    Type and Reason for Visit:  Initial, Positive Nutrition Screen (MST 2)    Nutrition Recommendations/Plan:   Advance diet as medically appropriate/as pt tolerated  Ensure Clear 3x/day  Monitor and document PO/ONS intake in I/Os     Malnutrition Assessment:  Malnutrition Status:  Mild malnutrition (10/02/23 9426)    Context:  Acute Illness     Findings of the 6 clinical characteristics of malnutrition:  Energy Intake:  75% or less of estimated energy requirements for 7 or more days (per pt report)  Weight Loss:  Unable to assess (pt reported ~10# wt loss - unable to specify time frame; limited wt hx in EMR)     Body Fat Loss:  No significant body fat loss     Muscle Mass Loss:  No significant muscle mass loss    Fluid Accumulation:  No significant fluid accumulation     Strength:  Not Performed    Nutrition Assessment:    Presents to ED w/ c/o hemoptysis, abdominal pain, and generalized fatigue. Diet advanced from NPO to clears r/t improving lipase levels. GI consult pending. Chart reviewed for MST 2. No significant wt loss per EMR, limited wt hx. (10/2) Pt reported a decreased appetite/ intake starting 9/30 w/ ~10# wt loss PTA - pt unable to specify time frame. Intakes increasing while IP per pt. Pt noted lactose intolerance rather than milk protein allergy, w/ an allergy to shellfish and nuts - will update on EMR. Pt amenable to starting Ensure clear. Rec'd to advance diet as medically appropriate/patient tolerated. Labs: Co2 17, AST 59, Lactic acid 2.2, . Meds: Lovenox, Zyprexa, Protonix, Risperdal, Zoloft, NaCl. Nutrition Related Findings:    LBM 10/1, No N/V/D/C nor c/s difficulty reported; hx of +N/V PTA. No edema noted. No significant findings via NFPE. Wound Type: None       Current Nutrition Intake & Therapies:    Average Meal Intake: % (per pt report)  Average Supplements Intake: None Ordered  ADULT DIET;  Clear Liquid  ADULT ORAL NUTRITION SUPPLEMENT;

## 2023-10-02 NOTE — CONSULTS
the chest wall. HEENT: Sclerae anicteric. Extra-occular muscles are intact. No oral ulcers. No abnormal pigmentation of the lips. The neck is supple. Cardiovascular: Regular rate and rhythm. No murmurs, gallops, or rubs. PMI nondisplaced. Carotids without bruits. Respiratory:  Comfortable breathing with no accessory muscle use. Clear breath sounds with no wheezes, rales, or rhonchi. GI:  Abdomen nondistended, soft, and nontender. Normal active bowel sounds. No enlargement of the liver or spleen. No masses palpable. Rectal:  Deferred  Musculoskeletal:  No pitting edema of the lower legs. Extremities have good range of motion. No costovertebral tenderness. Neurological:  Gross memory appears intact. Patient is alert and oriented. Psychiatric:  Mood appears appropriate with judgement intact. Lymphatic:  No cervical or supraclavicular adenopathy.     Lab/Data Review:  Recent Results (from the past 24 hour(s))   CBC with Auto Differential    Collection Time: 10/02/23 10:26 AM   Result Value Ref Range    WBC 7.2 4.1 - 11.1 K/uL    RBC 3.75 (L) 4.10 - 5.70 M/uL    Hemoglobin 12.1 12.1 - 17.0 g/dL    Hematocrit 37.1 36.6 - 50.3 %    MCV 98.9 80.0 - 99.0 FL    MCH 32.3 26.0 - 34.0 PG    MCHC 32.6 30.0 - 36.5 g/dL    RDW 13.2 11.5 - 14.5 %    Platelets 284 545 - 708 K/uL    MPV 11.9 8.9 - 12.9 FL    Nucleated RBCs 0.0 0.0  WBC    nRBC 0.00 0.00 - 0.01 K/uL    Neutrophils % 46 32 - 75 %    Lymphocytes % 37 12 - 49 %    Monocytes % 15 (H) 5 - 13 %    Eosinophils % 2 0 - 7 %    Basophils % 0 0 - 1 %    Immature Granulocytes 0 0 - 0.5 %    Neutrophils Absolute 3.3 1.8 - 8.0 K/UL    Lymphocytes Absolute 2.7 0.8 - 3.5 K/UL    Monocytes Absolute 1.0 0.0 - 1.0 K/UL    Eosinophils Absolute 0.1 0.0 - 0.4 K/UL    Basophils Absolute 0.0 0.0 - 0.1 K/UL    Absolute Immature Granulocyte 0.0 0.00 - 0.04 K/UL    Differential Type AUTOMATED     Basic Metabolic Panel    Collection Time: 10/02/23 10:26 AM   Result Value Ref Range    Sodium 138 136 - 145 mmol/L    Potassium 3.6 3.5 - 5.1 mmol/L    Chloride 107 97 - 108 mmol/L    CO2 24 21 - 32 mmol/L    Anion Gap 7 5 - 15 mmol/L    Glucose 82 65 - 100 mg/dL    BUN 8 6 - 20 mg/dL    Creatinine 1.06 0.70 - 1.30 mg/dL    Bun/Cre Ratio 8 (L) 12 - 20      Est, Glom Filt Rate >60 >60 ml/min/1.73m2    Calcium 8.6 8.5 - 10.1 mg/dL   Lipase    Collection Time: 10/02/23 10:26 AM   Result Value Ref Range    Lipase 107 73 - 393 U/L        CT ABDOMEN PELVIS W WO CONTRAST Additional Contrast? None   Final Result   No evidence of active bleed within the stomach, small or large intestine. Nonspecific nodular gastric mucosa thickening around the esophageal orifice   measuring up to 2.9 x 1.4 cm. Clinical correlation and follow-up EGD advised if   not already performed. No suspicious liver lesions or suggestion of portal hypertension. XR CHEST PORTABLE   Final Result   1. No acute disease                 Assessment/Plan:     Principal Problem:    Pancreatitis, unspecified pancreatitis type  Resolved Problems:    * No resolved hospital problems. *  Alcoholic pancreatitis.   Complete abstinence from alcohol  Suspected hematemesis will do upper endoscopy tomorrow      IP CONSULT TO GI    Thank you for allowing me to participate in this patients care  Cc Referring Physician   Arlene Barbosa MD

## 2023-10-02 NOTE — PROGRESS NOTES
Hospitalist Progress Note    NAME:   Enrique Ribeiro. : 1985   MRN: [id number]     Date/Time: 10/2/2023 1:36 PM  Patient PCP: Vita Mendiola MD    Estimated discharge date: 24 hours  Barriers: EGD, GI consult      HOSPITAL COURSE:     Norah Amado is a 45-year-old male with a PMHx significant for anxiety, depression, asthma, bipolar 1, Everett's esophagus and GERD who was admitted on 2023 for pancreatitis and hemoptysis. Of note, patient does report drinking 2-3 \"40-ounce bottle malt liquor\" day of presentation. Initial lipase 841. CT of the abdomen pelvis with and without contrast revealed no evidence for active bleed within the colon small or large intestine. Nonspecific nodular gastric mucosa thickening around the esophageal orifice measuring up to 2.9 x 1.4 cm. No suspicious liver lesions or suggestion of portal hypertension. Repeat lipase within normal range. Will advance patient's diet to clear liquids. GI consult pending. Assessment / Plan:    1. Pancreatitis  - Lipase 841>98  - IV fluids  - Clear liquids  - GI consult pending     2. Hemoptysis with history of Everett's esophagus  - CT of the abdomen pelvis with and without contrast revealed no evidence for active bleed within the colon small or large intestine. Nonspecific nodular gastric mucosa thickening around the esophageal orifice measuring up to 2.9 x 1.4 cm. No suspicious liver lesions or suggestion of portal hypertension.  - Continue Protonix  - GI consult pending  - Would benefit from EGD     3. Anxiety, depression and bipolar  - Continue clonidine, olanzapine risperidone and Zoloft  - Klonopin as needed for panic attacks     4. EtOH abuse  - Thiamine and folic acid  - CIWA    5. Asthma - stable  - CXR revealed no acute findings  - DuoNebs as needed     6. Tobacco dependency  - Nicotine patch  - Discussed and educated patient on importance of cessation     7.  History of chronic lower back pain  - Zanaflex as headaches. Psychiatric/Behavioral:  The patient is not nervous/anxious. EtOH use        PHYSICAL EXAM:  Physical Exam  Constitutional:       General: He is not in acute distress. Appearance: Normal appearance. HENT:      Head: Normocephalic and atraumatic. Nose: Nose normal. No congestion. Mouth/Throat:      Mouth: Mucous membranes are dry. Pharynx: Oropharynx is clear. No oropharyngeal exudate or posterior oropharyngeal erythema. Eyes:      Extraocular Movements: Extraocular movements intact. Conjunctiva/sclera: Conjunctivae normal.      Pupils: Pupils are equal, round, and reactive to light. Cardiovascular:      Rate and Rhythm: Normal rate and regular rhythm. Heart sounds: No murmur heard. No gallop. Pulmonary:      Effort: Pulmonary effort is normal.      Breath sounds: No wheezing, rhonchi or rales. Abdominal:      General: Abdomen is flat. Bowel sounds are normal.      Palpations: Abdomen is soft. Tenderness: There is no abdominal tenderness. Musculoskeletal:         General: Normal range of motion. Right lower leg: No edema. Left lower leg: No edema. Skin:     General: Skin is warm and dry. Findings: No rash. Neurological:      Mental Status: He is alert and oriented to person, place, and time. Cranial Nerves: No cranial nerve deficit.    Psychiatric:         Mood and Affect: Mood normal.          Reviewed most current lab test results and cultures  YES  Reviewed most current radiology test results   YES  Review and summation of old records today    NO  Reviewed patient's current orders and MAR    YES  PMH/SH reviewed - no change compared to H&P  ________________________________________________________________________  Care Plan discussed with:    Comments   Patient x    Family      RN x    Care Manager     Consultant                        Multidiciplinary team rounds were held today with , nursing, pharmacist and

## 2023-10-03 ENCOUNTER — ANESTHESIA (OUTPATIENT)
Facility: HOSPITAL | Age: 38
End: 2023-10-03
Payer: COMMERCIAL

## 2023-10-03 ENCOUNTER — ANESTHESIA EVENT (OUTPATIENT)
Facility: HOSPITAL | Age: 38
End: 2023-10-03
Payer: COMMERCIAL

## 2023-10-03 VITALS
RESPIRATION RATE: 18 BRPM | HEART RATE: 69 BPM | TEMPERATURE: 98 F | OXYGEN SATURATION: 100 % | BODY MASS INDEX: 26.3 KG/M2 | DIASTOLIC BLOOD PRESSURE: 83 MMHG | WEIGHT: 216 LBS | SYSTOLIC BLOOD PRESSURE: 127 MMHG | HEIGHT: 76 IN

## 2023-10-03 PROBLEM — K20.90 ESOPHAGITIS: Status: ACTIVE | Noted: 2023-10-03

## 2023-10-03 PROBLEM — K85.20 ALCOHOL-INDUCED ACUTE PANCREATITIS WITHOUT INFECTION OR NECROSIS: Status: ACTIVE | Noted: 2023-10-03

## 2023-10-03 PROCEDURE — 7100000010 HC PHASE II RECOVERY - FIRST 15 MIN: Performed by: INTERNAL MEDICINE

## 2023-10-03 PROCEDURE — 3700000000 HC ANESTHESIA ATTENDED CARE: Performed by: INTERNAL MEDICINE

## 2023-10-03 PROCEDURE — 2580000003 HC RX 258: Performed by: NURSE ANESTHETIST, CERTIFIED REGISTERED

## 2023-10-03 PROCEDURE — 3700000001 HC ADD 15 MINUTES (ANESTHESIA): Performed by: INTERNAL MEDICINE

## 2023-10-03 PROCEDURE — 0DJ08ZZ INSPECTION OF UPPER INTESTINAL TRACT, VIA NATURAL OR ARTIFICIAL OPENING ENDOSCOPIC: ICD-10-PCS | Performed by: INTERNAL MEDICINE

## 2023-10-03 PROCEDURE — 3600007512: Performed by: INTERNAL MEDICINE

## 2023-10-03 PROCEDURE — 2580000003 HC RX 258: Performed by: STUDENT IN AN ORGANIZED HEALTH CARE EDUCATION/TRAINING PROGRAM

## 2023-10-03 PROCEDURE — 3600007502: Performed by: INTERNAL MEDICINE

## 2023-10-03 PROCEDURE — 6360000002 HC RX W HCPCS: Performed by: NURSE ANESTHETIST, CERTIFIED REGISTERED

## 2023-10-03 PROCEDURE — 2709999900 HC NON-CHARGEABLE SUPPLY: Performed by: INTERNAL MEDICINE

## 2023-10-03 PROCEDURE — 7100000011 HC PHASE II RECOVERY - ADDTL 15 MIN: Performed by: INTERNAL MEDICINE

## 2023-10-03 PROCEDURE — 6370000000 HC RX 637 (ALT 250 FOR IP): Performed by: STUDENT IN AN ORGANIZED HEALTH CARE EDUCATION/TRAINING PROGRAM

## 2023-10-03 PROCEDURE — 2500000003 HC RX 250 WO HCPCS: Performed by: NURSE ANESTHETIST, CERTIFIED REGISTERED

## 2023-10-03 RX ORDER — FOLIC ACID 1 MG/1
1 TABLET ORAL DAILY
Qty: 30 TABLET | Refills: 3 | Status: SHIPPED | OUTPATIENT
Start: 2023-10-03 | End: 2023-10-03 | Stop reason: SDUPTHER

## 2023-10-03 RX ORDER — THIAMINE MONONITRATE (VIT B1) 100 MG
100 TABLET ORAL DAILY
Qty: 60 TABLET | Refills: 0 | Status: SHIPPED | OUTPATIENT
Start: 2023-10-03

## 2023-10-03 RX ORDER — PROPOFOL 10 MG/ML
INJECTION, EMULSION INTRAVENOUS
Status: COMPLETED
Start: 2023-10-03 | End: 2023-10-03

## 2023-10-03 RX ORDER — FOLIC ACID 1 MG/1
1 TABLET ORAL DAILY
Qty: 30 TABLET | Refills: 3 | Status: SHIPPED | OUTPATIENT
Start: 2023-10-03

## 2023-10-03 RX ORDER — THIAMINE MONONITRATE (VIT B1) 100 MG
100 TABLET ORAL DAILY
Qty: 60 TABLET | Refills: 0 | Status: SHIPPED | OUTPATIENT
Start: 2023-10-03 | End: 2023-10-03 | Stop reason: SDUPTHER

## 2023-10-03 RX ORDER — LIDOCAINE HYDROCHLORIDE 20 MG/ML
INJECTION, SOLUTION EPIDURAL; INFILTRATION; INTRACAUDAL; PERINEURAL
Status: COMPLETED
Start: 2023-10-03 | End: 2023-10-03

## 2023-10-03 RX ORDER — LIDOCAINE HYDROCHLORIDE 20 MG/ML
INJECTION, SOLUTION EPIDURAL; INFILTRATION; INTRACAUDAL; PERINEURAL PRN
Status: DISCONTINUED | OUTPATIENT
Start: 2023-10-03 | End: 2023-10-03 | Stop reason: SDUPTHER

## 2023-10-03 RX ORDER — SODIUM CHLORIDE, SODIUM LACTATE, POTASSIUM CHLORIDE, CALCIUM CHLORIDE 600; 310; 30; 20 MG/100ML; MG/100ML; MG/100ML; MG/100ML
INJECTION, SOLUTION INTRAVENOUS CONTINUOUS PRN
Status: DISCONTINUED | OUTPATIENT
Start: 2023-10-03 | End: 2023-10-03 | Stop reason: SDUPTHER

## 2023-10-03 RX ADMIN — RISPERIDONE 0.5 MG: 1 TABLET ORAL at 08:44

## 2023-10-03 RX ADMIN — SODIUM CHLORIDE, PRESERVATIVE FREE 10 ML: 5 INJECTION INTRAVENOUS at 09:00

## 2023-10-03 RX ADMIN — CLONAZEPAM 0.5 MG: 0.5 TABLET ORAL at 08:44

## 2023-10-03 RX ADMIN — PROPOFOL 50 MG: 10 INJECTION, EMULSION INTRAVENOUS at 14:06

## 2023-10-03 RX ADMIN — PROPOFOL 150 MG: 10 INJECTION, EMULSION INTRAVENOUS at 14:02

## 2023-10-03 RX ADMIN — LIDOCAINE HYDROCHLORIDE 100 MG: 20 INJECTION, SOLUTION EPIDURAL; INFILTRATION; INTRACAUDAL; PERINEURAL at 14:02

## 2023-10-03 RX ADMIN — SERTRALINE HYDROCHLORIDE 100 MG: 25 TABLET ORAL at 08:44

## 2023-10-03 RX ADMIN — SODIUM CHLORIDE, POTASSIUM CHLORIDE, SODIUM LACTATE AND CALCIUM CHLORIDE: 600; 310; 30; 20 INJECTION, SOLUTION INTRAVENOUS at 13:58

## 2023-10-03 ASSESSMENT — PAIN SCALES - GENERAL
PAINLEVEL_OUTOF10: 0

## 2023-10-03 NOTE — ANESTHESIA POSTPROCEDURE EVALUATION
Department of Anesthesiology  Postprocedure Note    Patient: Beth Townsend   MRN: [id number]  YOB: 1985  Date of evaluation: 10/3/2023      Procedure Summary     Date: 10/03/23 Room / Location: [facility] ENDO 03 / [facility] ENDOSCOPY    Anesthesia Start: 8291 Anesthesia Stop: 1410    Procedure: EGD ESOPHAGOGASTRODUODENOSCOPY (Upper GI Region) Diagnosis:       Abdominal pain, unspecified abdominal location      Hematemesis, unspecified whether nausea present      (Abdominal pain, unspecified abdominal location [R10.9])      (Hematemesis, unspecified whether nausea present [K92.0])    Surgeons: Reid Car MD Responsible Provider: Landen Anthony MD    Anesthesia Type: MAC ASA Status: Not recorded          Anesthesia Type: MAC    Rod Phase I: Rod Score: 10    Rod Phase II:        Anesthesia Post Evaluation    Patient location during evaluation: bedside  Patient participation: complete - patient participated  Level of consciousness: sleepy but conscious  Pain score: 0  Airway patency: patent  Nausea & Vomiting: no vomiting and no nausea  Complications: no  Cardiovascular status: blood pressure returned to baseline  Respiratory status: acceptable  Hydration status: stable  Pain management: adequate

## 2023-10-03 NOTE — PROGRESS NOTES
..Discharge plan of care/case management plan validated with provider discharge order. Patient is discharged home, on home self. Patient informed about discharge instructions and he verbalized understanding the instructions given.  Condition is satisfactory

## 2023-10-03 NOTE — CARE COORDINATION
2429: CM has reviewed pt chart    DCP: from home with mother    12: DC order in pending EGD and GI clearance. EGD currently on schedule for 1545.

## 2023-10-03 NOTE — CARE COORDINATION
DCP: Home self care    Transport: Pt to arrange with family      Transition of Care Plan:    RUR: 9%  Prior Level of Functioning: home self care  Disposition: home self care  If SNF or IPR: Date FOC offered: n/a  Date FOC received: n/a  Accepting facility: n/a  Date authorization started with reference number: n/a  Date authorization received and expires: n/a  Follow up appointments: unit secretary to setup as needed  DME needed: none  Transportation at discharge: pt to arrange  IM/IMM Medicare/ letter given: n/a  Is patient a [place] and connected with [facility]? no   If yes, was [employer] transfer form completed and [facility] notified? N/a  Caregiver Contact: pt a&o and able to notifiy  Discharge Caregiver contacted prior to discharge?  N/a  Care Conference needed? no  Barriers to discharge: none

## 2023-10-03 NOTE — DISCHARGE INSTR - COC
Continuity of Care Form    Patient Name: Estela Larsen :  1985  MRN:  [id number]    Admit date:  2023  Discharge date:  10/03/2023    Code Status Order: Full Code   Advance Directives:     Admitting Physician:  Josemanuel Gambino MD  PCP: Ellen John MD    Discharging Nurse: [facility] Unit/Room#: [phone]  Discharging Unit Phone Number:     Emergency Contact:   Extended Emergency Contact Information  Primary Emergency Contact: Mickie [place] [address] Phone: [phone]  Mobile Phone: [phone]  Relation: Parent  Secondary Emergency Contact: Louis Lopez [place] [address] [address] Phone: [phone]  Mobile Phone: [phone]  Relation: Brother/Sister    Past Surgical History:  Past Surgical History:   Procedure Laterality Date    COLONOSCOPY      GI      Biopsy of liver    UPPER GASTROINTESTINAL ENDOSCOPY      US GUIDED LIVER BIOPSY PERCUTANEOUS  2017    US GUIDED LIVER BIOPSY PERCUTANEOUS 2017 [facility] RAD US       Immunization History: There is no immunization history on file for this patient.     Active Problems:  Patient Active Problem List   Diagnosis Code    Chronic prostatitis N41.1    Thyroid function test abnormal R94.6    Perianal pain K62.89    Everett's esophagus K22.70    Priapism N48.30    Gastritis K29.70    Substance abuse (HCC) F19.10    Cannabis abuse F12.10    Biliary cirrhosis (HCC) K74.5    Acquired hypothyroidism E03.9    PTSD (post-traumatic stress disorder) F43.10    Hx of bipolar disorder Z86.59    Bipolar 1 disorder (HCC) F31.9    Pancreatitis, unspecified pancreatitis type K85.90    Esophagitis K20.90    Alcohol-induced acute pancreatitis without infection or necrosis K85.20       Isolation/Infection:   Isolation            No Isolation          Patient Infection Status       None to display                     Nurse Assessment:  Last Vital Signs: /83   Pulse

## 2023-10-07 ASSESSMENT — LIFESTYLE VARIABLES: SMOKING_STATUS: 1

## 2023-12-12 DIAGNOSIS — K21.9 GASTROESOPHAGEAL REFLUX DISEASE, UNSPECIFIED WHETHER ESOPHAGITIS PRESENT: ICD-10-CM

## 2023-12-12 RX ORDER — PANTOPRAZOLE SODIUM 40 MG/1
40 TABLET, DELAYED RELEASE ORAL
Qty: 90 TABLET | Refills: 1 | Status: SHIPPED | OUTPATIENT
Start: 2023-12-12

## 2024-01-18 ENCOUNTER — HOSPITAL ENCOUNTER (INPATIENT)
Facility: HOSPITAL | Age: 39
LOS: 5 days | Discharge: HOME | DRG: 753 | End: 2024-01-24
Attending: STUDENT IN AN ORGANIZED HEALTH CARE EDUCATION/TRAINING PROGRAM | Admitting: PSYCHIATRY & NEUROLOGY
Payer: COMMERCIAL

## 2024-01-18 ENCOUNTER — APPOINTMENT (OUTPATIENT)
Facility: HOSPITAL | Age: 39
DRG: 753 | End: 2024-01-18
Payer: COMMERCIAL

## 2024-01-18 DIAGNOSIS — T50.902A INTENTIONAL DRUG OVERDOSE, INITIAL ENCOUNTER (HCC): Primary | ICD-10-CM

## 2024-01-18 LAB
ALBUMIN SERPL-MCNC: 3.7 G/DL (ref 3.5–5)
ALBUMIN/GLOB SERPL: 0.9 (ref 1.1–2.2)
ALP SERPL-CCNC: 78 U/L (ref 45–117)
ALT SERPL-CCNC: 31 U/L (ref 12–78)
AMPHET UR QL SCN: NEGATIVE
ANION GAP SERPL CALC-SCNC: 7 MMOL/L (ref 5–15)
APAP SERPL-MCNC: <2 UG/ML (ref 10–30)
APPEARANCE UR: CLEAR
AST SERPL W P-5'-P-CCNC: 25 U/L (ref 15–37)
BACTERIA URNS QL MICRO: NEGATIVE /HPF
BARBITURATES UR QL SCN: NEGATIVE
BASOPHILS # BLD: 0.1 K/UL (ref 0–0.1)
BASOPHILS NFR BLD: 1 % (ref 0–1)
BENZODIAZ UR QL: NEGATIVE
BILIRUB SERPL-MCNC: 0.5 MG/DL (ref 0.2–1)
BILIRUB UR QL: NEGATIVE
BUN SERPL-MCNC: 6 MG/DL (ref 6–20)
BUN/CREAT SERPL: 7 (ref 12–20)
CA-I BLD-MCNC: 8.5 MG/DL (ref 8.5–10.1)
CANNABINOIDS UR QL SCN: POSITIVE
CHLORIDE SERPL-SCNC: 112 MMOL/L (ref 97–108)
CO2 SERPL-SCNC: 21 MMOL/L (ref 21–32)
COCAINE UR QL SCN: NEGATIVE
COLOR UR: NORMAL
CREAT SERPL-MCNC: 0.82 MG/DL (ref 0.7–1.3)
DIFFERENTIAL METHOD BLD: ABNORMAL
EOSINOPHIL # BLD: 0.3 K/UL (ref 0–0.4)
EOSINOPHIL NFR BLD: 3 % (ref 0–7)
EPITH CASTS URNS QL MICRO: NORMAL /LPF
ERYTHROCYTE [DISTWIDTH] IN BLOOD BY AUTOMATED COUNT: 13.6 % (ref 11.5–14.5)
ETHANOL SERPL-MCNC: 170 MG/DL (ref 0–0.08)
FLUAV RNA SPEC QL NAA+PROBE: NOT DETECTED
FLUBV RNA SPEC QL NAA+PROBE: NOT DETECTED
GLOBULIN SER CALC-MCNC: 4.1 G/DL (ref 2–4)
GLUCOSE SERPL-MCNC: 113 MG/DL (ref 65–100)
GLUCOSE UR STRIP.AUTO-MCNC: NEGATIVE MG/DL
HCT VFR BLD AUTO: 41.4 % (ref 36.6–50.3)
HGB BLD-MCNC: 13.5 G/DL (ref 12.1–17)
HGB UR QL STRIP: NEGATIVE
IMM GRANULOCYTES # BLD AUTO: 0 K/UL (ref 0–0.04)
IMM GRANULOCYTES NFR BLD AUTO: 0 % (ref 0–0.5)
KETONES UR QL STRIP.AUTO: NEGATIVE MG/DL
LEUKOCYTE ESTERASE UR QL STRIP.AUTO: NEGATIVE
LYMPHOCYTES # BLD: 3.7 K/UL (ref 0.8–3.5)
LYMPHOCYTES NFR BLD: 42 % (ref 12–49)
Lab: ABNORMAL
MAGNESIUM SERPL-MCNC: 2.2 MG/DL (ref 1.6–2.4)
MCH RBC QN AUTO: 32.7 PG (ref 26–34)
MCHC RBC AUTO-ENTMCNC: 32.6 G/DL (ref 30–36.5)
MCV RBC AUTO: 100.2 FL (ref 80–99)
METHADONE UR QL: NEGATIVE
MONOCYTES # BLD: 1.4 K/UL (ref 0–1)
MONOCYTES NFR BLD: 15 % (ref 5–13)
MUCOUS THREADS URNS QL MICRO: NEGATIVE /LPF
NEUTS SEG # BLD: 3.5 K/UL (ref 1.8–8)
NEUTS SEG NFR BLD: 39 % (ref 32–75)
NITRITE UR QL STRIP.AUTO: NEGATIVE
NRBC # BLD: 0 K/UL (ref 0–0.01)
NRBC BLD-RTO: 0 PER 100 WBC
OPIATES UR QL: NEGATIVE
PCP UR QL: NEGATIVE
PH UR STRIP: 6 (ref 5–8)
PLATELET # BLD AUTO: 170 K/UL (ref 150–400)
PMV BLD AUTO: 11.3 FL (ref 8.9–12.9)
POTASSIUM SERPL-SCNC: 3.4 MMOL/L (ref 3.5–5.1)
PROT SERPL-MCNC: 7.8 G/DL (ref 6.4–8.2)
PROT UR STRIP-MCNC: NEGATIVE MG/DL
RBC # BLD AUTO: 4.13 M/UL (ref 4.1–5.7)
RBC #/AREA URNS HPF: NORMAL /HPF (ref 0–5)
SALICYLATES SERPL-MCNC: 2.9 MG/DL (ref 2.8–20)
SARS-COV-2 RNA RESP QL NAA+PROBE: NOT DETECTED
SODIUM SERPL-SCNC: 140 MMOL/L (ref 136–145)
SP GR UR REFRACTOMETRY: <1.005 (ref 1–1.03)
UROBILINOGEN UR QL STRIP.AUTO: 0.1 EU/DL (ref 0.1–1)
WBC # BLD AUTO: 8.9 K/UL (ref 4.1–11.1)
WBC URNS QL MICRO: NORMAL /HPF (ref 0–4)

## 2024-01-18 PROCEDURE — 85025 COMPLETE CBC W/AUTO DIFF WBC: CPT

## 2024-01-18 PROCEDURE — 80179 DRUG ASSAY SALICYLATE: CPT

## 2024-01-18 PROCEDURE — 80053 COMPREHEN METABOLIC PANEL: CPT

## 2024-01-18 PROCEDURE — 82077 ASSAY SPEC XCP UR&BREATH IA: CPT

## 2024-01-18 PROCEDURE — 81001 URINALYSIS AUTO W/SCOPE: CPT

## 2024-01-18 PROCEDURE — 70450 CT HEAD/BRAIN W/O DYE: CPT

## 2024-01-18 PROCEDURE — 99285 EMERGENCY DEPT VISIT HI MDM: CPT

## 2024-01-18 PROCEDURE — 6360000002 HC RX W HCPCS: Performed by: STUDENT IN AN ORGANIZED HEALTH CARE EDUCATION/TRAINING PROGRAM

## 2024-01-18 PROCEDURE — 93005 ELECTROCARDIOGRAM TRACING: CPT | Performed by: STUDENT IN AN ORGANIZED HEALTH CARE EDUCATION/TRAINING PROGRAM

## 2024-01-18 PROCEDURE — 96375 TX/PRO/DX INJ NEW DRUG ADDON: CPT

## 2024-01-18 PROCEDURE — 96374 THER/PROPH/DIAG INJ IV PUSH: CPT

## 2024-01-18 PROCEDURE — 87636 SARSCOV2 & INF A&B AMP PRB: CPT

## 2024-01-18 PROCEDURE — 80307 DRUG TEST PRSMV CHEM ANLYZR: CPT

## 2024-01-18 PROCEDURE — 2580000003 HC RX 258: Performed by: EMERGENCY MEDICINE

## 2024-01-18 PROCEDURE — 83735 ASSAY OF MAGNESIUM: CPT

## 2024-01-18 PROCEDURE — 80143 DRUG ASSAY ACETAMINOPHEN: CPT

## 2024-01-18 PROCEDURE — 96361 HYDRATE IV INFUSION ADD-ON: CPT

## 2024-01-18 PROCEDURE — 36415 COLL VENOUS BLD VENIPUNCTURE: CPT

## 2024-01-18 RX ORDER — ONDANSETRON 2 MG/ML
4 INJECTION INTRAMUSCULAR; INTRAVENOUS ONCE
Status: COMPLETED | OUTPATIENT
Start: 2024-01-18 | End: 2024-01-18

## 2024-01-18 RX ORDER — FLUCONAZOLE 100 MG/1
150 TABLET ORAL ONCE
Status: DISCONTINUED | OUTPATIENT
Start: 2024-01-18 | End: 2024-01-18

## 2024-01-18 RX ORDER — NALOXONE HYDROCHLORIDE 0.4 MG/ML
0.4 INJECTION, SOLUTION INTRAMUSCULAR; INTRAVENOUS; SUBCUTANEOUS ONCE
Status: COMPLETED | OUTPATIENT
Start: 2024-01-18 | End: 2024-01-18

## 2024-01-18 RX ORDER — 0.9 % SODIUM CHLORIDE 0.9 %
1000 INTRAVENOUS SOLUTION INTRAVENOUS ONCE
Status: DISCONTINUED | OUTPATIENT
Start: 2024-01-18 | End: 2024-01-24 | Stop reason: HOSPADM

## 2024-01-18 RX ORDER — 0.9 % SODIUM CHLORIDE 0.9 %
1000 INTRAVENOUS SOLUTION INTRAVENOUS ONCE
Status: COMPLETED | OUTPATIENT
Start: 2024-01-18 | End: 2024-01-18

## 2024-01-18 RX ADMIN — NALXONE HYDROCHLORIDE 0.4 MG: 0.4 INJECTION INTRAMUSCULAR; INTRAVENOUS; SUBCUTANEOUS at 14:30

## 2024-01-18 RX ADMIN — SODIUM CHLORIDE 1000 ML: 9 INJECTION, SOLUTION INTRAVENOUS at 17:57

## 2024-01-18 RX ADMIN — ONDANSETRON 4 MG: 2 INJECTION INTRAMUSCULAR; INTRAVENOUS at 14:30

## 2024-01-18 NOTE — ED TRIAGE NOTES
Pt arrived via ems from home, states he took 15 benzos to overdose, SI.   Alert oriented and ambulatory on arrival

## 2024-01-18 NOTE — ED PROVIDER NOTES
[facility]  EMERGENCY DEPARTMENT HISTORY AND PHYSICAL EXAM      Date: 1/18/2024  Patient Name: Rudi Levine Jr.  MRN: [id number]  Birthdate 1985  Date of evaluation: 1/18/2024  Provider: Veto Rodriguez MD   Note Started: 4:02 PM EST 1/18/24    HISTORY OF PRESENT ILLNESS     Chief Complaint   Patient presents with    Mental Health Problem    Drug Overdose     Patient reports taking >10 lorazepam this morning, unsure what time.  Patient tells me he was taking the pills to intentionally harm self.        History Provided By: EMS    HPI: Rudi Levine Jr. is a 38 y.o. male with past medical history as reviewed below presents for evaluation of suicidal intent with drug overdose.  Per EMS, patient endorsed taking greater than 10, unknown milligram lorazepam this morning.  Patient did this to hurt himself.  Exam is limited due to patient being extremely somnolent and unable to contribute to history.    PAST MEDICAL HISTORY   Past Medical History:  Past Medical History:   Diagnosis Date    Anxiety     Everett's esophagus 6/11/2020    Bipolar 1 disorder (HCC)     Cannabis abuse 6/24/2020    Cirrhosis, alcoholic (HCC)     Depression     Gastritis 6/24/2020    GERD (gastroesophageal reflux disease)     Perianal pain 6/11/2020    Thyroid disease     Thyroid function test abnormal 6/24/2020       Past Surgical History:  Past Surgical History:   Procedure Laterality Date    COLONOSCOPY      GI      Biopsy of liver    UPPER GASTROINTESTINAL ENDOSCOPY      UPPER GASTROINTESTINAL ENDOSCOPY N/A 10/3/2023    EGD ESOPHAGOGASTRODUODENOSCOPY performed by Shonna Greene MD at [facility] ENDOSCOPY    US GUIDED LIVER BIOPSY PERCUTANEOUS  7/19/2017    US GUIDED LIVER BIOPSY PERCUTANEOUS 7/19/2017 [facility] RAD US       Family History:  Family History   Problem Relation Age of Onset    Cancer Maternal Grandmother     Cancer Father     Asthma Mother     Diabetes Mother     Hypertension Mother        Social History:  Social History

## 2024-01-19 PROCEDURE — 1240000000 HC EMOTIONAL WELLNESS R&B

## 2024-01-19 PROCEDURE — 6370000000 HC RX 637 (ALT 250 FOR IP): Performed by: EMERGENCY MEDICINE

## 2024-01-19 PROCEDURE — 96372 THER/PROPH/DIAG INJ SC/IM: CPT

## 2024-01-19 PROCEDURE — 6370000000 HC RX 637 (ALT 250 FOR IP): Performed by: PSYCHIATRY & NEUROLOGY

## 2024-01-19 PROCEDURE — 6360000002 HC RX W HCPCS: Performed by: STUDENT IN AN ORGANIZED HEALTH CARE EDUCATION/TRAINING PROGRAM

## 2024-01-19 PROCEDURE — 6370000000 HC RX 637 (ALT 250 FOR IP): Performed by: STUDENT IN AN ORGANIZED HEALTH CARE EDUCATION/TRAINING PROGRAM

## 2024-01-19 RX ORDER — OLANZAPINE 10 MG/1
20 TABLET ORAL NIGHTLY
Status: DISCONTINUED | OUTPATIENT
Start: 2024-01-19 | End: 2024-01-24 | Stop reason: HOSPADM

## 2024-01-19 RX ORDER — ZIPRASIDONE MESYLATE 20 MG/ML
20 INJECTION, POWDER, LYOPHILIZED, FOR SOLUTION INTRAMUSCULAR ONCE
Status: COMPLETED | OUTPATIENT
Start: 2024-01-19 | End: 2024-01-19

## 2024-01-19 RX ORDER — PANTOPRAZOLE SODIUM 40 MG/1
40 TABLET, DELAYED RELEASE ORAL
Status: DISCONTINUED | OUTPATIENT
Start: 2024-01-20 | End: 2024-01-24 | Stop reason: HOSPADM

## 2024-01-19 RX ORDER — OLANZAPINE 5 MG/1
10 TABLET, ORALLY DISINTEGRATING ORAL
Status: COMPLETED | OUTPATIENT
Start: 2024-01-19 | End: 2024-01-19

## 2024-01-19 RX ORDER — RISPERIDONE 1 MG/1
1 TABLET ORAL 2 TIMES DAILY
Status: DISCONTINUED | OUTPATIENT
Start: 2024-01-19 | End: 2024-01-24 | Stop reason: HOSPADM

## 2024-01-19 RX ORDER — HYDROXYZINE 50 MG/1
50 TABLET, FILM COATED ORAL 3 TIMES DAILY PRN
Status: DISCONTINUED | OUTPATIENT
Start: 2024-01-19 | End: 2024-01-24 | Stop reason: HOSPADM

## 2024-01-19 RX ORDER — CLONIDINE HYDROCHLORIDE 0.1 MG/1
0.1 TABLET ORAL 2 TIMES DAILY
Status: DISCONTINUED | OUTPATIENT
Start: 2024-01-19 | End: 2024-01-24 | Stop reason: HOSPADM

## 2024-01-19 RX ORDER — ZIPRASIDONE HYDROCHLORIDE 20 MG/1
20 CAPSULE ORAL EVERY 12 HOURS PRN
Status: DISCONTINUED | OUTPATIENT
Start: 2024-01-19 | End: 2024-01-24 | Stop reason: HOSPADM

## 2024-01-19 RX ORDER — OLANZAPINE 5 MG/1
10 TABLET, ORALLY DISINTEGRATING ORAL
Status: DISCONTINUED | OUTPATIENT
Start: 2024-01-19 | End: 2024-01-19

## 2024-01-19 RX ORDER — NICOTINE 21 MG/24HR
1 PATCH, TRANSDERMAL 24 HOURS TRANSDERMAL DAILY
Status: DISCONTINUED | OUTPATIENT
Start: 2024-01-19 | End: 2024-01-19

## 2024-01-19 RX ORDER — TRAZODONE HYDROCHLORIDE 50 MG/1
50 TABLET ORAL NIGHTLY PRN
Status: DISCONTINUED | OUTPATIENT
Start: 2024-01-19 | End: 2024-01-24 | Stop reason: HOSPADM

## 2024-01-19 RX ORDER — MAGNESIUM HYDROXIDE/ALUMINUM HYDROXICE/SIMETHICONE 120; 1200; 1200 MG/30ML; MG/30ML; MG/30ML
30 SUSPENSION ORAL EVERY 6 HOURS PRN
Status: DISCONTINUED | OUTPATIENT
Start: 2024-01-19 | End: 2024-01-24 | Stop reason: HOSPADM

## 2024-01-19 RX ORDER — OLANZAPINE 5 MG/1
10 TABLET, ORALLY DISINTEGRATING ORAL NIGHTLY
Status: DISCONTINUED | OUTPATIENT
Start: 2024-01-19 | End: 2024-01-19

## 2024-01-19 RX ORDER — OLANZAPINE 5 MG/1
10 TABLET, ORALLY DISINTEGRATING ORAL NIGHTLY
Status: DISCONTINUED | OUTPATIENT
Start: 2024-01-19 | End: 2024-01-24 | Stop reason: HOSPADM

## 2024-01-19 RX ORDER — ACETAMINOPHEN 325 MG/1
650 TABLET ORAL EVERY 4 HOURS PRN
Status: DISCONTINUED | OUTPATIENT
Start: 2024-01-19 | End: 2024-01-24 | Stop reason: HOSPADM

## 2024-01-19 RX ORDER — ZIPRASIDONE MESYLATE 20 MG/ML
20 INJECTION, POWDER, LYOPHILIZED, FOR SOLUTION INTRAMUSCULAR EVERY 12 HOURS PRN
Status: DISCONTINUED | OUTPATIENT
Start: 2024-01-19 | End: 2024-01-24 | Stop reason: HOSPADM

## 2024-01-19 RX ORDER — FOLIC ACID 1 MG/1
1 TABLET ORAL DAILY
Status: DISCONTINUED | OUTPATIENT
Start: 2024-01-20 | End: 2024-01-24 | Stop reason: HOSPADM

## 2024-01-19 RX ORDER — NICOTINE 21 MG/24HR
1 PATCH, TRANSDERMAL 24 HOURS TRANSDERMAL DAILY
Status: DISCONTINUED | OUTPATIENT
Start: 2024-01-20 | End: 2024-01-24 | Stop reason: HOSPADM

## 2024-01-19 RX ADMIN — OLANZAPINE 20 MG: 10 TABLET, FILM COATED ORAL at 21:08

## 2024-01-19 RX ADMIN — SERTRALINE HYDROCHLORIDE 100 MG: 50 TABLET ORAL at 21:08

## 2024-01-19 RX ADMIN — ZIPRASIDONE MESYLATE 20 MG: 20 INJECTION, POWDER, LYOPHILIZED, FOR SOLUTION INTRAMUSCULAR at 04:10

## 2024-01-19 RX ADMIN — RISPERIDONE 1 MG: 1 TABLET, FILM COATED ORAL at 21:08

## 2024-01-19 RX ADMIN — OLANZAPINE 10 MG: 5 TABLET, ORALLY DISINTEGRATING ORAL at 01:08

## 2024-01-19 RX ADMIN — CLONIDINE HYDROCHLORIDE 0.1 MG: 0.1 TABLET ORAL at 21:08

## 2024-01-19 ASSESSMENT — SLEEP AND FATIGUE QUESTIONNAIRES
DO YOU HAVE DIFFICULTY SLEEPING: NO
AVERAGE NUMBER OF SLEEP HOURS: 6
DO YOU USE A SLEEP AID: NO

## 2024-01-19 ASSESSMENT — LIFESTYLE VARIABLES
HOW OFTEN DO YOU HAVE A DRINK CONTAINING ALCOHOL: PATIENT DECLINED
HOW MANY STANDARD DRINKS CONTAINING ALCOHOL DO YOU HAVE ON A TYPICAL DAY: PATIENT DECLINED

## 2024-01-19 ASSESSMENT — PAIN - FUNCTIONAL ASSESSMENT
PAIN_FUNCTIONAL_ASSESSMENT: NONE - DENIES PAIN
PAIN_FUNCTIONAL_ASSESSMENT: NONE - DENIES PAIN

## 2024-01-19 NOTE — GROUP NOTE
Group Therapy Note    Date: 1/19/2024    Group Start Time: 1315  Group End Time: 1400  Group Topic: Process Group - Inpatient    [facility]    Tammie Jenkins MSW        Group Therapy Note: Facilitator encouraged the group to discuss feelings, initial mood vs. Current mood and what's improving in their treatment.     Attendees: 4       Patient's Goal:  Pt was encouraged to attend but decline.     Signature:  JERRY OBRIEN

## 2024-01-19 NOTE — GROUP NOTE
Group Therapy Note    Date: 1/19/2024    Group Start Time: 1550  Group End Time: 1640  Group Topic: Recreational    [facility]    Debi Canales        Group Therapy Note    Facilitated leisure skills group to reinforce positive coping and to manage mood through music, social interaction, group activities and art task       Attendees: 4/10      Notes:  Encouraged but did not attend    Discipline Responsible: Recreational Therapist      Signature:  DIMITRIOS Hermosillo

## 2024-01-19 NOTE — ED NOTES
Assumed care of patient and received report from Alicja CRUZ. 1:1 sitter at bedside.   
[facility] attempted to see patient at this time- waiting for patient to become more alert. Will reassess patient shortly for change in mental status.   
[facility] to reassess @0700 per night shift [facility]   
Call from poison control to follow up with pt, recent vitals shared.  
Called Poison Control at this time.  Recommendations include obtaining the following labs: Mag, aspirin, tylenol, cmp    Goal to keep electrolytes at the high end of normal    If Lorazepam was the only drug taken, then patient is okay to metabolize until back to baseline in ED.  If other meds were taken, will need to be monitored on tele for longer.   
Code Yayo called due to pt wandering halls and not being able to redirect into room.   
D19 on tele for psych assessment.    
Dr. Curiel in department to see patient.  Pt sleeping, NAD, lying in bed.   
Patient asleep with even respirations at this time. Easily arousable with physical stimulation  
Patient refuses to sit In bed, standing up and urinating onto floor at this time.   RNs attempted to assist patient back into bed, patient lowered himself to floor instead and layed down in his urine.   Multiple Rns had to lift patient from floor and place him back into the bed. Patient continues to be uncooperative   Sitting 1:1 with MEY Schaeffer   
Poison Control called to follow up on pt, informed of lab values, current vital signs.   
Pt cleaned and purewick replaced. Pt more awake however not oriented at this time. Following directions.   
Pt continuously wandering out of room and being redirected by 1:1 sitter. Pt came to door and stuck middle finger at patient when told to stay in room. Pj Goodman notified.   
Pt found standing at the end of bed with gown removed and monitor removed. Pt redirected into bed and gown and sheets changed. Pt asked where he was and the year, He states \"[facility] and its Friday.\" Pt resting in bed, Warm blankets placed on pt. 1:1 sitter at bedside.   
Pt found with urine saturated bed sheets. Pt cleaned and placed into gown and purewick placed on patient. Dr BRADFORD at bedside who states we will start IV fluids and get a head CT. Pt still lethargic and unable to assess. When asking patient to roll from side to side to clean him he sits up in bed and states \"leave me the fuck alone\" with garbled speech and starts to swing arms around in attempt to hit RN. Not opening eyes or responding to voice once laid back down.   
Pt pulling monitor off of himself at this time. Vitals obtained. 1:1 at bedside.   
Pt resting at this time. Pt unable to stay awake when woken up. Unable to follow directions.   
Pt taken to CT on defib monitor. Unable to successfully get CT scan due to pt not cooperating. Unable to get patient on CT table at this time. IV fluids infusing very slow due to pt continuously bending arm and kinking IV. Unable to start second IV.   
Pt wandering around in room, pulling gown down, easily redirected.  Pt found with belongings in room, all equipment in room.  Pt denies SI/HI, has rambling speech.  Room made psych safe, belongings collected.    
Pt's home meds collected from pt stretcher and placed in green belongings bag with pt label, bag placed in nutrition room.   
Rn attempts to call [facility] and left message to call back   
[facility] arrived, 2S states they will not take the patient until the psych MD rounds on the pt in the ED.    
all nuts per pt       Milk (Cow)      Other reaction(s): Not Reported This Time, lactose intolerant per pt     Shellfish Allergy      Other reaction(s): Unknown (comments)     History:   Past Medical History:   Diagnosis Date    Anxiety     Everett's esophagus 6/11/2020    Bipolar 1 disorder (HCC)     Cannabis abuse 6/24/2020    Cirrhosis, alcoholic (HCC)     Depression     Gastritis 6/24/2020    GERD (gastroesophageal reflux disease)     Perianal pain 6/11/2020    Thyroid disease     Thyroid function test abnormal 6/24/2020       Assessment  Vitals: MEWS Score: 0  Level of Consciousness: Alert (0)   Vitals:    01/18/24 2245 01/19/24 0015 01/19/24 0215 01/19/24 0749   BP: 103/69 (!) 139/97 130/68 (!) 133/90   Pulse: (!) 113 (!) 109 90 88   Resp: 18 14 16 14   Temp:    98 °F (36.7 °C)   TempSrc:    Oral   SpO2: 100% 100% 100% 100%   Weight:       Height:         Deterioration Index (DI): Deterioration Index: 17.4  Deterioration Index (DI) Interventions Performed:    O2 Flow Rate:    O2 Device: O2 Device: None (Room air)  Cardiac Rhythm:    Critical Lab Results: [unfilled]  Cultures: {Cultures:  NIH Score: NIH     Active LDA's:    Active Central Lines:                          Active Wounds:    Active Velazquez's:    Active Feeding Tubes:      Administered Medications:   Medications   sodium chloride 0.9 % bolus 1,000 mL (0 mLs IntraVENous Held 1/18/24 1757)   OLANZapine zydis (ZYPREXA) disintegrating tablet 10 mg (10 mg Oral Not Given 1/19/24 0349)   naloxone (NARCAN) injection 0.4 mg (0.4 mg IntraVENous Given 1/18/24 1430)   ondansetron (ZOFRAN) injection 4 mg (4 mg IntraVENous Given 1/18/24 1430)   sodium chloride 0.9 % bolus 1,000 mL (0 mLs IntraVENous Stopped 1/18/24 2349)   OLANZapine zydis (ZYPREXA) disintegrating tablet 10 mg (10 mg Oral Given 1/19/24 0108)   ziprasidone (GEODON) injection 20 mg (20 mg IntraMUSCular Given 1/19/24 0410)     Last documented pain medication administration: na  Pertinent or High Risk

## 2024-01-20 PROCEDURE — 1240000000 HC EMOTIONAL WELLNESS R&B

## 2024-01-20 PROCEDURE — 6370000000 HC RX 637 (ALT 250 FOR IP): Performed by: PSYCHIATRY & NEUROLOGY

## 2024-01-20 RX ORDER — GAUZE BANDAGE 2" X 2"
100 BANDAGE TOPICAL DAILY
Status: DISCONTINUED | OUTPATIENT
Start: 2024-01-20 | End: 2024-01-24 | Stop reason: HOSPADM

## 2024-01-20 RX ADMIN — CLONIDINE HYDROCHLORIDE 0.1 MG: 0.1 TABLET ORAL at 08:28

## 2024-01-20 RX ADMIN — FOLIC ACID 1 MG: 1 TABLET ORAL at 08:28

## 2024-01-20 RX ADMIN — OLANZAPINE 20 MG: 10 TABLET, FILM COATED ORAL at 21:08

## 2024-01-20 RX ADMIN — RISPERIDONE 1 MG: 1 TABLET, FILM COATED ORAL at 21:08

## 2024-01-20 RX ADMIN — SERTRALINE HYDROCHLORIDE 100 MG: 50 TABLET ORAL at 21:08

## 2024-01-20 RX ADMIN — PANTOPRAZOLE SODIUM 40 MG: 40 TABLET, DELAYED RELEASE ORAL at 08:28

## 2024-01-20 RX ADMIN — RISPERIDONE 1 MG: 1 TABLET, FILM COATED ORAL at 08:28

## 2024-01-20 RX ADMIN — CLONIDINE HYDROCHLORIDE 0.1 MG: 0.1 TABLET ORAL at 21:15

## 2024-01-20 NOTE — GROUP NOTE
Group Therapy Note    Date: 1/20/2024    Group Start Time: 1515  Group End Time: 1600  Group Topic: Recreational    SSR 2 BH NON ACUTE    Danya Garnett        Group Therapy Note    Attendees: 5/9     Recreational Therapist facilitated structured leisure skills group to introduce healthy leisure skills as positive way to cope and manage mood.          Notes:  Did not attend group despite encouragement       Discipline Responsible: Recreational Therapist      Signature:  DIMITRIOS Roberson

## 2024-01-20 NOTE — H&P
= Potassium 3.4     Hospitalist Admission Note    Patient: Rudi Levine Jr. MRN: [id number]  SSN: [id number]    YOB: 1985  Age: 38 y.o.  Sex: male      Patient PCP: Eliana Garcia MD    ______________________________________________________________________  Given the patient's current clinical presentation, I have a high level of concern for decompensation if discharged from the emergency department. Complex decision making was performed, which includes reviewing the patient's available past medical records, laboratory results, and x-ray films.       My assessment of this patient's clinical condition and my plan of care is as follows.    Assessment / Plan:    Anxiety and depression  -Continue clonidine olanzapine risperidone and Zoloft  -Klonopin needed for panic attacks    History of Everett's esophagus  -He denies any hemoptysis or melena  -States he has been compliant with his Protonix we will continue      History of ETOH abuse  -Will need to continue thiamine and folic acid  -He states last alcohol intake  -Continue CIWA protocol and COWS per protocol      Mild hypokalemia  = Potassium 3.4  -Check labs in the morning                Medical Decision Making:     Labs reviewed by myself: Potassium reviewed from 1/1 8 noted to be 3.4 will recheck labs in the morning    Diagnostic data reviewed by myself: CT of the head    Toxic drug monitoring: N/A    Discussed case with: Attending Dr. Yeh and ED provider. After discussion I am in agreement that acuity of patient's medical condition necessitates hospital stay.      Subjective:      Rudi Levine Jr. is a 38 y.o. male with a PMH of PMH of anxiety, depression, bipolar 1, Everett's esophagus and GERD who presents with Anxiety mental health problem, drug overdose patient reported taking more than 10 lorazepam this morning, patient states he was taking the pills intentionally harm to self.  He is being admitted for further psych evaluation    In

## 2024-01-20 NOTE — PROGRESS NOTES
Psychiatric Progress Note      Patient: Rudi Levine Jr. MRN: [id number]  SSN: [id number]    YOB: 1985  Age: 38 y.o.  Sex: male      Admit Date: 1/18/2024       Subjective:     Rudi Levine Jr.  reports feeling *** .    Appropriately interactive and aware.     Denies SI/HI/AH/VH.  No aggression or violence.      Tolerating medications well.      Eating well and sleeping well.    Case reviewed with nursing staff and no significant issues or events reported in the last 24 hours.    Staff has observed patient interacting on the unit and attending group.    Objective:     Vitals:    01/19/24 1246 01/19/24 1344 01/19/24 2000 01/20/24 0713   BP: 90/67 124/75 (!) 101/58 113/76   Pulse: 96 100 74 (!) 101   Resp: 14 18 18 20   Temp: 98.5 °F (36.9 °C) 99.1 °F (37.3 °C) 98.9 °F (37.2 °C) 98.6 °F (37 °C)   TempSrc: Oral Oral Oral Oral   SpO2: 100%   97%   Weight:       Height:            Mental Status Exam:     Patient is alert, oriented x4  Speech is coherent, moderate volume, no flight of ideas, no looseness of association.  Appropriate dressed and groomed  No Active suicidal ideations, plan or intent.  No active homicidal ideations plan or intent  No command hallucinations  Thought Processes linear  No persecutory delusions  Not seen responding to any active internal stimuli  Mood depressed with congruent affect  Insight limited  Judgement limited     No signs of tardive dyskinesia or extrapyramidal symptoms    MEDICATIONS:  Current Facility-Administered Medications   Medication Dose Route Frequency    thiamine mononitrate tablet 100 mg  100 mg Oral Daily    OLANZapine zydis (ZYPREXA) disintegrating tablet 10 mg  10 mg Oral Nightly    acetaminophen (TYLENOL) tablet 650 mg  650 mg Oral Q4H PRN    hydrOXYzine HCl (ATARAX) tablet 50 mg  50 mg Oral TID PRN    traZODone (DESYREL) tablet 50 mg  50 mg Oral Nightly PRN    magnesium hydroxide (MILK OF MAGNESIA) 400 MG/5ML suspension 30 mL  30 mL Oral Daily PRN

## 2024-01-20 NOTE — GROUP NOTE
Group Therapy Note    Date: 1/20/2024    Group Start Time: 1045  Group End Time: 1130  Group Topic: Education Group - Inpatient    SSR 2  NON ACUTE    Danya Garnett        Group Therapy Note    Attendees: 0/10      Facilitated structured group to increase awareness of triggers and encourage and to explore places, people and situations that cause triggers and positive ways to manage triggers.         Notes:  Did not attend despite encouragement        Discipline Responsible: Recreational Therapist      Signature:  DIMITRIOS Roberson

## 2024-01-21 LAB
ANION GAP SERPL CALC-SCNC: 3 MMOL/L (ref 5–15)
BUN SERPL-MCNC: 16 MG/DL (ref 6–20)
BUN/CREAT SERPL: 14 (ref 12–20)
CA-I BLD-MCNC: 8.4 MG/DL (ref 8.5–10.1)
CHLORIDE SERPL-SCNC: 109 MMOL/L (ref 97–108)
CO2 SERPL-SCNC: 24 MMOL/L (ref 21–32)
CREAT SERPL-MCNC: 1.15 MG/DL (ref 0.7–1.3)
ERYTHROCYTE [DISTWIDTH] IN BLOOD BY AUTOMATED COUNT: 13.2 % (ref 11.5–14.5)
GLUCOSE SERPL-MCNC: 73 MG/DL (ref 65–100)
HCT VFR BLD AUTO: 38.3 % (ref 36.6–50.3)
HGB BLD-MCNC: 12.5 G/DL (ref 12.1–17)
MCH RBC QN AUTO: 32.9 PG (ref 26–34)
MCHC RBC AUTO-ENTMCNC: 32.6 G/DL (ref 30–36.5)
MCV RBC AUTO: 100.8 FL (ref 80–99)
NRBC # BLD: 0 K/UL (ref 0–0.01)
NRBC BLD-RTO: 0 PER 100 WBC
PLATELET # BLD AUTO: 169 K/UL (ref 150–400)
PMV BLD AUTO: 11.6 FL (ref 8.9–12.9)
POTASSIUM SERPL-SCNC: 3.6 MMOL/L (ref 3.5–5.1)
RBC # BLD AUTO: 3.8 M/UL (ref 4.1–5.7)
SODIUM SERPL-SCNC: 136 MMOL/L (ref 136–145)
WBC # BLD AUTO: 7.2 K/UL (ref 4.1–11.1)

## 2024-01-21 PROCEDURE — 85027 COMPLETE CBC AUTOMATED: CPT

## 2024-01-21 PROCEDURE — 80048 BASIC METABOLIC PNL TOTAL CA: CPT

## 2024-01-21 PROCEDURE — 36415 COLL VENOUS BLD VENIPUNCTURE: CPT

## 2024-01-21 PROCEDURE — 6370000000 HC RX 637 (ALT 250 FOR IP): Performed by: NURSE PRACTITIONER

## 2024-01-21 PROCEDURE — 6370000000 HC RX 637 (ALT 250 FOR IP): Performed by: PSYCHIATRY & NEUROLOGY

## 2024-01-21 PROCEDURE — 1240000000 HC EMOTIONAL WELLNESS R&B

## 2024-01-21 RX ADMIN — CLONIDINE HYDROCHLORIDE 0.1 MG: 0.1 TABLET ORAL at 08:15

## 2024-01-21 RX ADMIN — RISPERIDONE 1 MG: 1 TABLET, FILM COATED ORAL at 08:15

## 2024-01-21 RX ADMIN — FOLIC ACID 1 MG: 1 TABLET ORAL at 08:15

## 2024-01-21 RX ADMIN — THIAMINE HCL TAB 100 MG 100 MG: 100 TAB at 08:15

## 2024-01-21 RX ADMIN — CLONIDINE HYDROCHLORIDE 0.1 MG: 0.1 TABLET ORAL at 20:52

## 2024-01-21 RX ADMIN — SERTRALINE HYDROCHLORIDE 100 MG: 50 TABLET ORAL at 20:52

## 2024-01-21 RX ADMIN — PANTOPRAZOLE SODIUM 40 MG: 40 TABLET, DELAYED RELEASE ORAL at 06:49

## 2024-01-21 RX ADMIN — OLANZAPINE 20 MG: 10 TABLET, FILM COATED ORAL at 20:52

## 2024-01-21 RX ADMIN — RISPERIDONE 1 MG: 1 TABLET, FILM COATED ORAL at 20:52

## 2024-01-21 NOTE — PLAN OF CARE
Problem: Anxiety  Goal: Will report anxiety at manageable levels  Description: INTERVENTIONS:  1. Administer medication as ordered  2. Teach and rehearse alternative coping skills  3. Provide emotional support with 1:1 interaction with staff  1/20/2024 2313 by Bethany Rodriguez RN  Outcome: Progressing  1/20/2024 2312 by Bethany Rodriguez RN  Outcome: Progressing     Problem: Depression/Self Harm  Goal: Effect of psychiatric condition will be minimized and patient will be protected from self harm  Description: INTERVENTIONS:  1. Assess impact of patient's symptoms on level of functioning, self care needs and offer support as indicated  2. Assess patient/family knowledge of depression, impact on illness and need for teaching  3. Provide emotional support, presence and reassurance  4. Assess for possible suicidal thoughts or ideation. If patient expresses suicidal thoughts or statements do not leave alone, initiate Suicide Precautions, move to a room close to the nursing station and obtain sitter  5. Initiate consults as appropriate with Mental Health Professional, Spiritual Care, Psychosocial CNS, and consider a recommendation to the [facility] for a Psychiatric Consultation  1/20/2024 2313 by Bethany Rodriguez RN  Outcome: Progressing  1/20/2024 2312 by Bethany Rodriguez RN  Outcome: Progressing    -pt has been resting quietly in bed without any distress.  -pt has been cooperative and pleasant; no self injurious behavior noted or reported.

## 2024-01-21 NOTE — PROGRESS NOTES
Hospitalist Progress Note    NAME:   Rudi Levine Jr.   : 1985   MRN: [id number]     Date/Time: 2024 7:43 AM  Patient PCP: Eliana Gacria MD    Estimated discharge date:tbd  Barriers: psych evalution      Assessment / Plan:    Anxiety and depression  -Continue clonidine olanzapine risperidone and Zoloft  -Klonopin needed for panic attacks     History of Everett's esophagus  -He denies any hemoptysis or melena  -States he has been compliant with his Protonix we will continue        History of ETOH abuse  Drug overdose   -Will need to continue thiamine and folic acid  -He states last alcohol intake last week   -Continue CIWA protocol and COWS per protocol  - defer to primary team         Mild hypokalemia  = Potassium 3.4  -Check labs in the morning    K 3.6     HOSPITALIST TEAM WILL SIGN OFF       Medical Decision Making:   I personally reviewed labs: .LAB24[wbc  I personally reviewed imaging:  CT Head W/O Contrast   Final Result      No acute intracranial abnormality.             Toxic drug monitoring: na  Discussed case with: psych         Code Status: full  DVT Prophylaxis:  ambulating      Subjective:     Chief Complaint / Reason for Physician Visit  .Rudi Levine Jr. is a 38 y.o. male with a PMH of PMH of anxiety, depression, bipolar 1, Everett's esophagus and GERD who presents with Anxiety mental health problem, drug overdose patient reported taking more than 10 lorazepam this morning, patient states he was taking the pills intentionally harm to self.  He is being admitted for further psych evaluation     In ED vitals blood pressure became elevated at 158/105 with a heart rate of 112. Initial labs significant for urine positive for THC ethanol 170.  Potassium was noted to be 3.4  patient admitted for further management. Patient started on Zyprexa 10 mg was given in the ED along with Geodon 20 mg IM patient continues to be agitated in the ED.     Pt without complaints   Objective:

## 2024-01-21 NOTE — PROGRESS NOTES
Psychiatric Progress Note      Patient: Rudi Levine Jr. MRN: [id number]  SSN: [id number]    YOB: 1985  Age: 38 y.o.  Sex: male      Admit Date: 1/18/2024       Subjective:     Rudi Levine Jr.  reports feeling *** .    Appropriately interactive and aware.     Denies SI/HI/AH/VH.  No aggression or violence.      Tolerating medications well.      Eating well and sleeping well.    Case reviewed with nursing staff and no significant issues or events reported in the last 24 hours.    Staff has observed patient interacting on the unit and attending group.    Objective:     Vitals:    01/20/24 1914 01/20/24 2046 01/20/24 2113 01/21/24 0712   BP: (!) 97/6 (!) 101/56 111/76 113/87   Pulse: (!) 112 70 81 82   Resp: 18 16  17   Temp: 98.7 °F (37.1 °C) 98.8 °F (37.1 °C)  98.4 °F (36.9 °C)   TempSrc: Oral Oral  Oral   SpO2: 99% 98% 100% 100%   Weight:       Height:            Mental Status Exam:     Patient is alert, oriented x4  Speech is coherent, moderate volume, no flight of ideas, no looseness of association.  Appropriate dressed and groomed  No Active suicidal ideations, plan or intent.  No active homicidal ideations plan or intent  No command hallucinations  Thought Processes linear  No persecutory delusions  Not seen responding to any active internal stimuli  Mood depressed with congruent affect  Insight limited  Judgement limited     No signs of tardive dyskinesia or extrapyramidal symptoms    MEDICATIONS:  Current Facility-Administered Medications   Medication Dose Route Frequency    thiamine mononitrate tablet 100 mg  100 mg Oral Daily    [Held by provider] OLANZapine zydis (ZYPREXA) disintegrating tablet 10 mg  10 mg Oral Nightly    acetaminophen (TYLENOL) tablet 650 mg  650 mg Oral Q4H PRN    hydrOXYzine HCl (ATARAX) tablet 50 mg  50 mg Oral TID PRN    traZODone (DESYREL) tablet 50 mg  50 mg Oral Nightly PRN    magnesium hydroxide (MILK OF MAGNESIA) 400 MG/5ML suspension 30 mL  30 mL Oral Daily

## 2024-01-21 NOTE — GROUP NOTE
Group Therapy Note    Date: 1/21/2024    Group Start Time: 1515  Group End Time: 1600  Group Topic: Recreational    SSR 2 BH NON ACUTE    Danya Garnett        Group Therapy Note    Attendees: 8/9    Recreational Therapist facilitated structured leisure skills group to introduce healthy leisure skills as positive way to cope and manage mood.             Patient's Goal:  Attend groups daily     Notes:  Attended group and listened to songs with peers.  Pt was receptive to intervention and responded to prompts from staff. Attended entire session and was attentive during group.       Status After Intervention:  Improved    Participation Level: Active Listener    Participation Quality: Appropriate      Speech:  normal      Thought Process/Content: Logical      Affective Functioning: Congruent      Mood:  calm       Level of consciousness:  Alert      Response to Learning: Progressing to goal      Endings: None Reported    Modes of Intervention: Activity      Discipline Responsible: Recreational Therapist      Signature:  DIMITRIOS Roberson

## 2024-01-21 NOTE — GROUP NOTE
Group Therapy Note    Date: 1/21/2024    Group Start Time: 1045  Group End Time: 1130  Group Topic: Education Group - Inpatient    SSR 2  NON ACUTE    Danya Garnett        Group Therapy Note    Attendees: 3/9    Facilitated structured group discussion to identify protective factors that have been valuable and protective factors that could be improved in managing stressors.         Patient's Goal:  Attend groups daily    Notes:  Attended group discussion related to Protective Factors. Pt reviewed Protective Factors and was able to rate the level of strength in those factors in relation to managing stress.  Pt was cooperative and receptive to intervention. Pt stated  he has a supportive mother. Related need to work on improving his physical health and healthy thinking.     Status After Intervention:  Improved    Participation Level: Active Listener    Participation Quality: Appropriate      Speech:  normal      Thought Process/Content: Logical      Affective Functioning: Congruent      Mood:  calm      Level of consciousness:  Alert      Response to Learning: Progressing to goal      Endings: None Reported    Modes of Intervention: Education and Support      Discipline Responsible: Recreational Therapist      Signature:  DIMITRIOS Roberson

## 2024-01-21 NOTE — GROUP NOTE
Group Therapy Note    Date: 1/21/2024    Group Start Time: 1730  Group End Time: 1800  Group Topic: Recreational    SSR 2 BH NON ACUTE    Danya Garnett        Group Therapy Note    Attendees: 1/9    Facilitated structured relaxation group to identify positive ways to cope and manage daily stressors.        Notes:  Did not attend group despite encouragement    Discipline Responsible: Recreational Therapist      Signature:  DIMITRIOS Roberson

## 2024-01-22 PROCEDURE — 6370000000 HC RX 637 (ALT 250 FOR IP): Performed by: NURSE PRACTITIONER

## 2024-01-22 PROCEDURE — 6370000000 HC RX 637 (ALT 250 FOR IP): Performed by: PSYCHIATRY & NEUROLOGY

## 2024-01-22 PROCEDURE — 1240000000 HC EMOTIONAL WELLNESS R&B

## 2024-01-22 RX ADMIN — RISPERIDONE 1 MG: 1 TABLET, FILM COATED ORAL at 20:48

## 2024-01-22 RX ADMIN — PANTOPRAZOLE SODIUM 40 MG: 40 TABLET, DELAYED RELEASE ORAL at 07:51

## 2024-01-22 RX ADMIN — FOLIC ACID 1 MG: 1 TABLET ORAL at 08:18

## 2024-01-22 RX ADMIN — HYDROXYZINE HYDROCHLORIDE 50 MG: 50 TABLET, FILM COATED ORAL at 08:24

## 2024-01-22 RX ADMIN — SERTRALINE HYDROCHLORIDE 100 MG: 50 TABLET ORAL at 20:48

## 2024-01-22 RX ADMIN — RISPERIDONE 1 MG: 1 TABLET, FILM COATED ORAL at 08:18

## 2024-01-22 RX ADMIN — CLONIDINE HYDROCHLORIDE 0.1 MG: 0.1 TABLET ORAL at 20:48

## 2024-01-22 RX ADMIN — THIAMINE HCL TAB 100 MG 100 MG: 100 TAB at 08:18

## 2024-01-22 RX ADMIN — OLANZAPINE 20 MG: 10 TABLET, FILM COATED ORAL at 20:48

## 2024-01-22 RX ADMIN — CLONIDINE HYDROCHLORIDE 0.1 MG: 0.1 TABLET ORAL at 08:18

## 2024-01-22 NOTE — PLAN OF CARE
Problem: Anxiety  Goal: Will report anxiety at manageable levels  Description: INTERVENTIONS:  1. Administer medication as ordered  2. Teach and rehearse alternative coping skills  3. Provide emotional support with 1:1 interaction with staff  Outcome: Progressing     Problem: Coping  Goal: Pt/Family able to verbalize concerns and demonstrate effective coping strategies  Description: INTERVENTIONS:  1. Assist patient/family to identify coping skills, available support systems and cultural and spiritual values  2. Provide emotional support, including active listening and acknowledgement of concerns of patient and caregivers  3. Reduce environmental stimuli, as able  4. Instruct patient/family in relaxation techniques, as appropriate  5. Assess for spiritual pain/suffering and initiate Spiritual Care, Psychosocial Clinical Specialist consults as needed  Outcome: Progressing     Problem: Decision Making  Goal: Pt/Family able to effectively weigh alternatives and participate in decision making related to treatment and care  Description: INTERVENTIONS:  1. Determine when there are differences between patient's view, family's view, and healthcare provider's view of condition  2. Facilitate patient and family articulation of goals for care  3. Help patient and family identify pros/cons of alternative solutions  4. Provide information as requested by patient/family  5. Respect patient/family right to receive or not to receive information  6. Serve as a liaison between patient and family and health care team  7. Initiate Consults from Ethics, Palliative Care or initiate Family Care Conference as is appropriate  Outcome: Progressing     Problem: Depression/Self Harm  Goal: Effect of psychiatric condition will be minimized and patient will be protected from self harm  Description: INTERVENTIONS:  1. Assess impact of patient's symptoms on level of functioning, self care needs and offer support as indicated  2. Assess

## 2024-01-22 NOTE — CARE COORDINATION
01/22/24 1630   ITP   Date of Plan 01/22/24   Date of Next Review 01/29/24   Primary Diagnosis Code Bipolar 1 disorder (HCC) F31.9   Barriers to Treatment Need for psychoeducation   Strengths Incorporated in Plan Acknowledging need for assistance   Plan of Care   Long Term Goal (LTG) Stated in patient/guardian terms \"get on meds and get better\"   Short Term Goal 1   Short Term Goal 1 Client will be oriented to program and staff, and participate in assessment process   Baseline Functioning to make the individual comfortable with the environment while in the hospital   Target the individual will be able to approach staff and voice appropriate needs   Objectives Client will participate in group therapy;Client will participate in individual therapy   Intervention 1 Assess safety   Frequency daily   Measured by Staff observation;Self report   Staff Responsible Clinical staff;[facility] staff   Intervention 2 Acknowledge client strengths   Frequency dialy   Measured by Self report;Staff observation   Staff Responsible [facility] staff;Clinical staff   Intervention 3 Group therapy   Frequency daily   Measured by Staff observation;Self report   Staff Responsible Clinical staff;[facility] staff   STG Goal 1 Status: Patient Appears to be  Progressing toward treatment plan goal   Short Term Goal 2   Short Term Goal 2 Client will learn and demonstrate effective coping skills   Baseline Functioning to improve the over all quality of life   Target the patient will be able to use positive skills to deal with life stressors   Objectives Client will participate in group therapy;Client will participate in individual therapy   Intervention 1 Indvidual therapy   Frequency daily   Measured by Self report;Staff observation   Staff Responsible Clinical staff;[facility] staff   Intervention 2 Milieu therapy and support   Frequency daily   Measured by Self report;Staff observation   Staff Responsible Clinical staff;[facility] staff   Intervention 3 Monitor medications

## 2024-01-22 NOTE — PROGRESS NOTES
Progress Note  Date:2024       Room:[facility]  Patient Name:Rudi Levine Jr.     YOB: 1985     Age:38 y.o.        Subjective    Subjective has been depressed, had been suicidal, states he was upseta s he wwas not able to see his son and makes him sad and hopeless and suicidal. No command [facility]  Review of Systems  Objective         Vitals Last 24 Hours:  TEMPERATURE:  Temp  Av.3 °F (36.8 °C)  Min: 97.9 °F (36.6 °C)  Max: 98.6 °F (37 °C)  RESPIRATIONS RANGE: Resp  Av.5  Min: 17  Max: 18  PULSE OXIMETRY RANGE: No data recorded  PULSE RANGE: Pulse  Av  Min: 80  Max: 84  BLOOD PRESSURE RANGE: Systolic (24hrs), Av , Min:111 , Max:112   ; Diastolic (24hrs), Av, Min:82, Max:82    I/O (24Hr):  No intake or output data in the 24 hours ending 24 1716  Objective  Labs/Imaging/Diagnostics    Labs:  CBC:  Recent Labs     24  1330   WBC 7.2   RBC 3.80*   HGB 12.5   HCT 38.3   .8*   RDW 13.2        CHEMISTRIES:  Recent Labs     24  1330      K 3.6   *   CO2 24   BUN 16   CREATININE 1.15   GLUCOSE 73     PT/INR:No results for input(s): \"PROTIME\", \"INR\" in the last 72 hours.  APTT:No results for input(s): \"APTT\" in the last 72 hours.  LIVER PROFILE:No results for input(s): \"AST\", \"ALT\", \"BILIDIR\", \"BILITOT\", \"ALKPHOS\" in the last 72 hours.    Imaging Last 24 Hours:  No results found.  Assessment//Plan           Hospital Problems             Last Modified POA    * (Principal) Bipolar 1 disorder (HCC) 2024 Yes     Assessment & Plan    Current Facility-Administered Medications:     thiamine mononitrate tablet 100 mg, 100 mg, Oral, Daily, Amuquandoh, Ana A, APRN - NP, 100 mg at 24 0818    [Held by provider] OLANZapine zydis (ZYPREXA) disintegrating tablet 10 mg, 10 mg, Oral, Nightly, aCsimiro Smith MD    acetaminophen (TYLENOL) tablet 650 mg, 650 mg, Oral, Q4H PRN, Melba Curiel MD    hydrOXYzine HCl (ATARAX) tablet 50 mg, 50 mg,

## 2024-01-22 NOTE — GROUP NOTE
Group Therapy Note    Date: 1/22/2024    Group Start Time: 1315  Group End Time: 1410  Group Topic: Process Group - Inpatient    [facility] 2 [facility] Susan Newton        Group Therapy Note: This writer facilitated a group where individuals where asked how their day was going, what have they or what would they like to accomplish by being in the hospital and what would they like to do after discharge. Each individual provided responses with positive feed back from this writer and peers.     Attendees: 4       Patient's Goal:  to attend groups    Notes:  Pt was encouraged to attend but did not.     Signature:  Susan Aranda

## 2024-01-23 LAB
EKG ATRIAL RATE: 106 BPM
EKG DIAGNOSIS: NORMAL
EKG P AXIS: 71 DEGREES
EKG P-R INTERVAL: 138 MS
EKG Q-T INTERVAL: 372 MS
EKG QRS DURATION: 82 MS
EKG QTC CALCULATION (BAZETT): 494 MS
EKG R AXIS: -23 DEGREES
EKG T AXIS: 55 DEGREES
EKG VENTRICULAR RATE: 106 BPM

## 2024-01-23 PROCEDURE — 1240000000 HC EMOTIONAL WELLNESS R&B

## 2024-01-23 PROCEDURE — 6370000000 HC RX 637 (ALT 250 FOR IP): Performed by: NURSE PRACTITIONER

## 2024-01-23 PROCEDURE — 6370000000 HC RX 637 (ALT 250 FOR IP): Performed by: PSYCHIATRY & NEUROLOGY

## 2024-01-23 RX ADMIN — TRAZODONE HYDROCHLORIDE 50 MG: 50 TABLET ORAL at 21:16

## 2024-01-23 RX ADMIN — FOLIC ACID 1 MG: 1 TABLET ORAL at 08:30

## 2024-01-23 RX ADMIN — THIAMINE HCL TAB 100 MG 100 MG: 100 TAB at 08:31

## 2024-01-23 RX ADMIN — RISPERIDONE 1 MG: 1 TABLET, FILM COATED ORAL at 08:30

## 2024-01-23 RX ADMIN — PANTOPRAZOLE SODIUM 40 MG: 40 TABLET, DELAYED RELEASE ORAL at 06:11

## 2024-01-23 RX ADMIN — OLANZAPINE 20 MG: 10 TABLET, FILM COATED ORAL at 21:16

## 2024-01-23 RX ADMIN — CLONIDINE HYDROCHLORIDE 0.1 MG: 0.1 TABLET ORAL at 08:31

## 2024-01-23 RX ADMIN — RISPERIDONE 1 MG: 1 TABLET, FILM COATED ORAL at 21:16

## 2024-01-23 RX ADMIN — SERTRALINE HYDROCHLORIDE 100 MG: 50 TABLET ORAL at 21:16

## 2024-01-23 NOTE — GROUP NOTE
Group Therapy Note    Date: 1/23/2024    Group Start Time: 1315  Group End Time: 1400  Group Topic: Process Group - Inpatient    [facility]    SOCIALWORKER_NS; Melanie Lovell        Group Therapy Note  Process group was focused on self-esteem. Writer used the self-esteem therapy ball and provided the pts with a self-esteem worksheet. Pts were guarded at first but then began to communicate more during group. Most of the pts reported having \"good\" self-esteem but appeared to have a difficult time answering the questions.   Attendees: 7-8       Patient's Goal:  \"to stop drinking\"    Notes:  Pt interacted well with others and appeared to have some insight on how to work on his self-esteem. He shared that he needs to eliminate drinking when he becomes depressed. He had a difficult time expressing things that he likes to do. Pt after talking with the writer pt shared that he likes to watch movies.    Status After Intervention:  Improved    Participation Level: Active Listener and Interactive    Participation Quality: Appropriate, Attentive, and Sharing      Speech:  normal      Thought Process/Content: Logical  Linear      Affective Functioning: Congruent      Mood:  \"better\"      Level of consciousness:  Alert, Oriented x4, and Attentive      Response to Learning: Able to verbalize current knowledge/experience, Able to verbalize/acknowledge new learning, Able to retain information, Capable of insight, and Progressing to goal      Endings: None Reported    Modes of Intervention: Support, Socialization, and Exploration      Discipline Responsible: /Counselor      Signature:  Melanie Lovell

## 2024-01-23 NOTE — GROUP NOTE
Group Therapy Note    Date: 1/22/2024    Group Start Time: 1935  Group End Time: 2020  Group Topic: Recreational    SSR 2 [facility] NON ACUTE    Danya Garnett        Group Therapy Note    Attendees: 4/8    Recreational Therapist facilitated structured leisure skills group to introduce healthy leisure skills as positive way to cope and manage mood.             Notes:  Did not attend group despite encouragement    Discipline Responsible: Recreational Therapist      Signature:  DIMITRIOS Roberson

## 2024-01-24 VITALS
TEMPERATURE: 98.2 F | RESPIRATION RATE: 17 BRPM | HEIGHT: 75 IN | OXYGEN SATURATION: 100 % | DIASTOLIC BLOOD PRESSURE: 88 MMHG | SYSTOLIC BLOOD PRESSURE: 121 MMHG | HEART RATE: 101 BPM | WEIGHT: 215 LBS | BODY MASS INDEX: 26.73 KG/M2

## 2024-01-24 PROCEDURE — 6370000000 HC RX 637 (ALT 250 FOR IP): Performed by: PSYCHIATRY & NEUROLOGY

## 2024-01-24 PROCEDURE — 6370000000 HC RX 637 (ALT 250 FOR IP): Performed by: NURSE PRACTITIONER

## 2024-01-24 RX ORDER — OLANZAPINE 20 MG/1
20 TABLET ORAL NIGHTLY
Qty: 30 TABLET | Refills: 1 | Status: SHIPPED | OUTPATIENT
Start: 2024-01-24

## 2024-01-24 RX ORDER — TRAZODONE HYDROCHLORIDE 50 MG/1
50 TABLET ORAL NIGHTLY PRN
Qty: 30 TABLET | Refills: 1 | Status: SHIPPED | OUTPATIENT
Start: 2024-01-24

## 2024-01-24 RX ORDER — SERTRALINE HYDROCHLORIDE 100 MG/1
100 TABLET, FILM COATED ORAL NIGHTLY
Qty: 30 TABLET | Refills: 1 | Status: SHIPPED | OUTPATIENT
Start: 2024-01-24

## 2024-01-24 RX ORDER — RISPERIDONE 1 MG/1
1 TABLET ORAL 2 TIMES DAILY
Qty: 60 TABLET | Refills: 1 | Status: SHIPPED | OUTPATIENT
Start: 2024-01-24

## 2024-01-24 RX ADMIN — CLONIDINE HYDROCHLORIDE 0.1 MG: 0.1 TABLET ORAL at 08:19

## 2024-01-24 RX ADMIN — RISPERIDONE 1 MG: 1 TABLET, FILM COATED ORAL at 08:19

## 2024-01-24 RX ADMIN — HYDROXYZINE HYDROCHLORIDE 50 MG: 50 TABLET, FILM COATED ORAL at 00:33

## 2024-01-24 RX ADMIN — FOLIC ACID 1 MG: 1 TABLET ORAL at 08:19

## 2024-01-24 RX ADMIN — PANTOPRAZOLE SODIUM 40 MG: 40 TABLET, DELAYED RELEASE ORAL at 08:19

## 2024-01-24 RX ADMIN — THIAMINE HCL TAB 100 MG 100 MG: 100 TAB at 08:19

## 2024-01-24 NOTE — GROUP NOTE
Group Therapy Note    Date: 1/23/2024    Group Start Time: 1945  Group End Time: 2030  Group Topic: Recreational    [facility] 2 [facility] NON ACUTE    Danya Garnett        Group Therapy Note    Attendees: 6/9    Recreational Therapist facilitated structured leisure skills group to introduce healthy leisure skills as positive way to cope and manage mood.             Notes:  Did not attend group despite encouragement    Discipline Responsible: Recreational Therapist      Signature:  DIMITRIOS Roberson

## 2024-01-24 NOTE — PROGRESS NOTES
Progress Note  Date:2024       Room:[facility]  Patient Name:Rudi Levine Jr.     YOB: 1985     Age:38 y.o.        Subjective    Subjective has been depressed,states he has not had any suicidal feelings today. He feels he is ready to go home. Encouraged participation t process his triggers and managing his feelings  Review of Systems  Objective         Vitals Last 24 Hours:  TEMPERATURE:  Temp  Av.4 °F (36.9 °C)  Min: 98.2 °F (36.8 °C)  Max: 98.6 °F (37 °C)  RESPIRATIONS RANGE: Resp  Av  Min: 20  Max: 20  PULSE OXIMETRY RANGE: SpO2  Av %  Min: 100 %  Max: 100 %  PULSE RANGE: Pulse  Av  Min: 68  Max: 84  BLOOD PRESSURE RANGE: Systolic (24hrs), Av , Min:99 , Max:112   ; Diastolic (24hrs), Av, Min:58, Max:78    I/O (24Hr):  No intake or output data in the 24 hours ending 24 0037  Objective:  Vital signs: (most recent): Blood pressure (!) 99/58, pulse 84, temperature 98.2 °F (36.8 °C), temperature source Oral, resp. rate 20, height 1.905 m (6' 3\"), weight 97.5 kg (215 lb), SpO2 100 %.      Labs/Imaging/Diagnostics    Labs:  CBC:  Recent Labs     24  1330   WBC 7.2   RBC 3.80*   HGB 12.5   HCT 38.3   .8*   RDW 13.2          CHEMISTRIES:  Recent Labs     24  1330      K 3.6   *   CO2 24   BUN 16   CREATININE 1.15   GLUCOSE 73       PT/INR:No results for input(s): \"PROTIME\", \"INR\" in the last 72 hours.  APTT:No results for input(s): \"APTT\" in the last 72 hours.  LIVER PROFILE:No results for input(s): \"AST\", \"ALT\", \"BILIDIR\", \"BILITOT\", \"ALKPHOS\" in the last 72 hours.    Imaging Last 24 Hours:  No results found.  Assessment//Plan           Hospital Problems             Last Modified POA    * (Principal) Bipolar 1 disorder ([facility]) 2024 Yes   Assessment & Plan  Family meeting  Process gp therpay  No side effects    Current Facility-Administered Medications:     thiamine mononitrate tablet 100 mg, 100 mg, Oral, Daily, Amuquandoh,
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
## 2024-01-24 NOTE — DISCHARGE SUMMARY
PROGNOSIS:    Limited ---- based on nature of patient's pathology/ies and treatment compliance issues.  Prognosis is greatly dependent upon patient's ability to follow up psychiatric/psychotherapy appointments as well as to comply with psychiatric medications as prescribed.            DISCHARGE MEDICATIONS:    Informed consent given for the use of following psychotropic medications:     Medication List        START taking these medications      traZODone 50 MG tablet  Commonly known as: DESYREL  Take 1 tablet by mouth nightly as needed for Sleep            CHANGE how you take these medications      risperiDONE 1 MG tablet  Commonly known as: RISPERDAL  Take 1 tablet by mouth 2 times daily  What changed:   medication strength  how much to take  when to take this     sertraline 100 MG tablet  Commonly known as: ZOLOFT  Take 1 tablet by mouth nightly  What changed: when to take this            CONTINUE taking these medications      albuterol sulfate  (90 Base) MCG/ACT inhaler  Commonly known as: Ventolin HFA  Inhale 2 puffs into the lungs 4 times daily as needed for Wheezing or Shortness of Breath     clonazePAM 0.5 MG tablet  Commonly known as: KLONOPIN     cloNIDine 0.1 MG tablet  Commonly known as: CATAPRES     folic acid 1 MG tablet  Commonly known as: FOLVITE  Take 1 tablet by mouth daily     OLANZapine 20 MG tablet  Commonly known as: ZYPREXA  Take 1 tablet by mouth nightly     ondansetron 4 MG disintegrating tablet  Commonly known as: ZOFRAN-ODT  Take 1 tablet by mouth 3 times daily as needed for Nausea or Vomiting     pantoprazole 40 MG tablet  Commonly known as: PROTONIX  TAKE 1 TABLET BY MOUTH EVERY DAY BEFORE BREAKFAST     tiZANidine 2 MG tablet  Commonly known as: ZANAFLEX  Take 1 tablet by mouth 3 times daily as needed (back pain)     vitamin B-1 100 MG tablet  Commonly known as: THIAMINE  Take 1 tablet by mouth daily               Where to Get Your Medications        These medications were

## 2024-05-24 ENCOUNTER — HOSPITAL ENCOUNTER (INPATIENT)
Facility: HOSPITAL | Age: 39
LOS: 4 days | Discharge: HOME | DRG: 753 | End: 2024-05-28
Attending: EMERGENCY MEDICINE | Admitting: PSYCHIATRY & NEUROLOGY
Payer: COMMERCIAL

## 2024-05-24 DIAGNOSIS — R45.850 HOMICIDAL BEHAVIOR: ICD-10-CM

## 2024-05-24 DIAGNOSIS — R45.851 SUICIDAL IDEATION: Primary | ICD-10-CM

## 2024-05-24 PROBLEM — F31.9 BIPOLAR DISORDER (HCC): Status: ACTIVE | Noted: 2024-05-24

## 2024-05-24 LAB
ALBUMIN SERPL-MCNC: 4.2 G/DL (ref 3.5–5)
ALBUMIN/GLOB SERPL: 1.1 (ref 1.1–2.2)
ALP SERPL-CCNC: 98 U/L (ref 45–117)
ALT SERPL-CCNC: 61 U/L (ref 12–78)
AMPHET UR QL SCN: NEGATIVE
ANION GAP SERPL CALC-SCNC: 7 MMOL/L (ref 5–15)
AST SERPL W P-5'-P-CCNC: 39 U/L (ref 15–37)
BARBITURATES UR QL SCN: NEGATIVE
BENZODIAZ UR QL: NEGATIVE
BILIRUB SERPL-MCNC: 0.6 MG/DL (ref 0.2–1)
BUN SERPL-MCNC: 10 MG/DL (ref 6–20)
BUN/CREAT SERPL: 9 (ref 12–20)
CA-I BLD-MCNC: 9.6 MG/DL (ref 8.5–10.1)
CANNABINOIDS UR QL SCN: POSITIVE
CHLORIDE SERPL-SCNC: 109 MMOL/L (ref 97–108)
CO2 SERPL-SCNC: 21 MMOL/L (ref 21–32)
COCAINE UR QL SCN: NEGATIVE
CREAT SERPL-MCNC: 1.08 MG/DL (ref 0.7–1.3)
ERYTHROCYTE [DISTWIDTH] IN BLOOD BY AUTOMATED COUNT: 12.6 % (ref 11.5–14.5)
ETHANOL SERPL-MCNC: 163 MG/DL (ref 0–0.08)
GLOBULIN SER CALC-MCNC: 4 G/DL (ref 2–4)
GLUCOSE SERPL-MCNC: 91 MG/DL (ref 65–100)
HCT VFR BLD AUTO: 41.8 % (ref 36.6–50.3)
HGB BLD-MCNC: 14.1 G/DL (ref 12.1–17)
Lab: ABNORMAL
MCH RBC QN AUTO: 32.7 PG (ref 26–34)
MCHC RBC AUTO-ENTMCNC: 33.7 G/DL (ref 30–36.5)
MCV RBC AUTO: 97 FL (ref 80–99)
METHADONE UR QL: NEGATIVE
NRBC # BLD: 0 K/UL (ref 0–0.01)
NRBC BLD-RTO: 0 PER 100 WBC
OPIATES UR QL: NEGATIVE
PCP UR QL: NEGATIVE
PLATELET # BLD AUTO: 181 K/UL (ref 150–400)
PMV BLD AUTO: 11 FL (ref 8.9–12.9)
POTASSIUM SERPL-SCNC: 3.5 MMOL/L (ref 3.5–5.1)
PROT SERPL-MCNC: 8.2 G/DL (ref 6.4–8.2)
RBC # BLD AUTO: 4.31 M/UL (ref 4.1–5.7)
SODIUM SERPL-SCNC: 137 MMOL/L (ref 136–145)
WBC # BLD AUTO: 5.4 K/UL (ref 4.1–11.1)

## 2024-05-24 PROCEDURE — 6370000000 HC RX 637 (ALT 250 FOR IP): Performed by: EMERGENCY MEDICINE

## 2024-05-24 PROCEDURE — 6370000000 HC RX 637 (ALT 250 FOR IP): Performed by: STUDENT IN AN ORGANIZED HEALTH CARE EDUCATION/TRAINING PROGRAM

## 2024-05-24 PROCEDURE — 82077 ASSAY SPEC XCP UR&BREATH IA: CPT

## 2024-05-24 PROCEDURE — 80053 COMPREHEN METABOLIC PANEL: CPT

## 2024-05-24 PROCEDURE — 99285 EMERGENCY DEPT VISIT HI MDM: CPT

## 2024-05-24 PROCEDURE — 1240000000 HC EMOTIONAL WELLNESS R&B

## 2024-05-24 PROCEDURE — 85027 COMPLETE CBC AUTOMATED: CPT

## 2024-05-24 PROCEDURE — 80307 DRUG TEST PRSMV CHEM ANLYZR: CPT

## 2024-05-24 PROCEDURE — 6370000000 HC RX 637 (ALT 250 FOR IP): Performed by: PSYCHIATRY & NEUROLOGY

## 2024-05-24 RX ORDER — CLONIDINE HYDROCHLORIDE 0.1 MG/1
0.1 TABLET ORAL 3 TIMES DAILY PRN
Status: DISCONTINUED | OUTPATIENT
Start: 2024-05-24 | End: 2024-05-28 | Stop reason: HOSPADM

## 2024-05-24 RX ORDER — LORAZEPAM 1 MG/1
1 TABLET ORAL ONCE
Status: COMPLETED | OUTPATIENT
Start: 2024-05-24 | End: 2024-05-24

## 2024-05-24 RX ORDER — HYDROXYZINE 50 MG/1
50 TABLET, FILM COATED ORAL 3 TIMES DAILY PRN
Status: DISCONTINUED | OUTPATIENT
Start: 2024-05-24 | End: 2024-05-28 | Stop reason: HOSPADM

## 2024-05-24 RX ORDER — ACETAMINOPHEN 325 MG/1
650 TABLET ORAL EVERY 4 HOURS PRN
Status: DISCONTINUED | OUTPATIENT
Start: 2024-05-24 | End: 2024-05-28 | Stop reason: HOSPADM

## 2024-05-24 RX ORDER — CHLORDIAZEPOXIDE HYDROCHLORIDE 25 MG/1
25 CAPSULE, GELATIN COATED ORAL 3 TIMES DAILY
Status: DISCONTINUED | OUTPATIENT
Start: 2024-05-24 | End: 2024-05-28

## 2024-05-24 RX ORDER — TRAZODONE HYDROCHLORIDE 50 MG/1
50 TABLET ORAL NIGHTLY PRN
Status: DISCONTINUED | OUTPATIENT
Start: 2024-05-24 | End: 2024-05-28 | Stop reason: HOSPADM

## 2024-05-24 RX ORDER — NICOTINE 21 MG/24HR
1 PATCH, TRANSDERMAL 24 HOURS TRANSDERMAL DAILY
Status: DISCONTINUED | OUTPATIENT
Start: 2024-05-25 | End: 2024-05-28 | Stop reason: HOSPADM

## 2024-05-24 RX ORDER — MAGNESIUM HYDROXIDE/ALUMINUM HYDROXICE/SIMETHICONE 120; 1200; 1200 MG/30ML; MG/30ML; MG/30ML
30 SUSPENSION ORAL EVERY 6 HOURS PRN
Status: DISCONTINUED | OUTPATIENT
Start: 2024-05-24 | End: 2024-05-28 | Stop reason: HOSPADM

## 2024-05-24 RX ORDER — OLANZAPINE 10 MG/1
20 TABLET ORAL NIGHTLY
Status: DISCONTINUED | OUTPATIENT
Start: 2024-05-24 | End: 2024-05-28 | Stop reason: HOSPADM

## 2024-05-24 RX ADMIN — CHLORDIAZEPOXIDE HYDROCHLORIDE 25 MG: 25 CAPSULE ORAL at 21:53

## 2024-05-24 RX ADMIN — SERTRALINE HYDROCHLORIDE 100 MG: 50 TABLET ORAL at 14:12

## 2024-05-24 RX ADMIN — OLANZAPINE 20 MG: 10 TABLET, FILM COATED ORAL at 21:51

## 2024-05-24 RX ADMIN — LORAZEPAM 1 MG: 1 TABLET ORAL at 17:34

## 2024-05-24 ASSESSMENT — SLEEP AND FATIGUE QUESTIONNAIRES
SLEEP PATTERN: DISTURBED/INTERRUPTED SLEEP;RESTLESSNESS
DO YOU HAVE DIFFICULTY SLEEPING: YES
AVERAGE NUMBER OF SLEEP HOURS: 5
DO YOU USE A SLEEP AID: NO

## 2024-05-24 ASSESSMENT — LIFESTYLE VARIABLES
HOW OFTEN DO YOU HAVE A DRINK CONTAINING ALCOHOL: 2-4 TIMES A MONTH
HOW MANY STANDARD DRINKS CONTAINING ALCOHOL DO YOU HAVE ON A TYPICAL DAY: PATIENT DOES NOT DRINK

## 2024-05-24 ASSESSMENT — PAIN - FUNCTIONAL ASSESSMENT: PAIN_FUNCTIONAL_ASSESSMENT: NONE - DENIES PAIN

## 2024-05-24 NOTE — BH NOTE
Behavioral Health Bed Placement Note:  Patient is accepted by Dr Curiel to [facility] acute side. Bed 238-1 which will be a private room per Dr. Curiel.   Patient is accepted pending ED offer pt prn med for anxiety Per Dr Curiel.  Patient will not be a one to one upon entering U per Dr Curiel; to be reassessed on unit by admission nurse.  Report .

## 2024-05-24 NOTE — ED PROVIDER NOTES
[facility] EMERGENCY DEPT  EMERGENCY DEPARTMENT HISTORY AND PHYSICAL EXAM      Date: 5/24/2024  Patient Name: Rudi Levine Jr.  MRN: [id number]  Birthdate 1985  Date of evaluation: 5/24/2024  Provider: Reuben Lawrence MD   Note Started: 12:35 PM EDT 5/24/24    HISTORY OF PRESENT ILLNESS     Chief Complaint   Patient presents with    Mental Health Problem       History Provided By: Patient, PD    HPI: Rudi Levine Jr. is a 39 y.o. male with a history of bipolar disorder, depression, anxiety, presenting under paperless ECO brought in by police for homicidal ideation and suicidal ideation.  Patient was the one who actually called police as he was having thoughts of hurting his sister and boyfriend as well as hurting himself.  When police arrived, he got aggressive and had 2 knives in his pocket so they decided to do an ECO.  Patient denies any chest pain abdominal pain, dysuria or hematuria.  States medically he feels fine.  States that he has been taking his Zoloft, olanzapine and anxiety medications.    PAST MEDICAL HISTORY   Past Medical History:  Past Medical History:   Diagnosis Date    Anxiety     Everett's esophagus 6/11/2020    Bipolar 1 disorder (HCC)     Cannabis abuse 6/24/2020    Cirrhosis, alcoholic (HCC)     Depression     Gastritis 6/24/2020    GERD (gastroesophageal reflux disease)     Perianal pain 6/11/2020    Thyroid disease     Thyroid function test abnormal 6/24/2020       Past Surgical History:  Past Surgical History:   Procedure Laterality Date    COLONOSCOPY      GI      Biopsy of liver    UPPER GASTROINTESTINAL ENDOSCOPY      UPPER GASTROINTESTINAL ENDOSCOPY N/A 10/3/2023    EGD ESOPHAGOGASTRODUODENOSCOPY performed by Shonna Greene MD at [facility] ENDOSCOPY    US GUIDED LIVER BIOPSY PERCUTANEOUS  7/19/2017    US GUIDED LIVER BIOPSY PERCUTANEOUS 7/19/2017 [facility] RAD US       Family History:  Family History   Problem Relation Age of Onset    Cancer Maternal Grandmother     Cancer Father     Asthma

## 2024-05-24 NOTE — ED NOTES
Pt instructed not to keep blanket over head. Pt screaming,\"bitch, if you tell me that one more time I'm really gonna go off and have an episode.

## 2024-05-24 NOTE — BSMART NOTE
Paperless ECO executed at 1200PM by TUTU Chiu.  Jane with [employer] aware and is prescreening pt at this time via iPad/Zoom

## 2024-05-24 NOTE — ED NOTES
Officer at bedside served TDO, Pt report called report to Tamara and pt will go upstairs at 1930 due to shift change.

## 2024-05-24 NOTE — ED TRIAGE NOTES
Pt brought by police on paperless ECO for attempting to kill a family member, pt endorses HI and SI.

## 2024-05-24 NOTE — BSMART NOTE
KATHERINEO Apple aware that pt was accepted to 2S pending receipt of TDO/petition.  Anna CRUZ aware

## 2024-05-25 PROCEDURE — 6370000000 HC RX 637 (ALT 250 FOR IP): Performed by: EMERGENCY MEDICINE

## 2024-05-25 PROCEDURE — 1240000000 HC EMOTIONAL WELLNESS R&B

## 2024-05-25 PROCEDURE — 6370000000 HC RX 637 (ALT 250 FOR IP): Performed by: PSYCHIATRY & NEUROLOGY

## 2024-05-25 RX ORDER — GAUZE BANDAGE 2" X 2"
100 BANDAGE TOPICAL DAILY
Status: DISCONTINUED | OUTPATIENT
Start: 2024-05-25 | End: 2024-05-28 | Stop reason: HOSPADM

## 2024-05-25 RX ORDER — ALBUTEROL SULFATE 90 UG/1
2 AEROSOL, METERED RESPIRATORY (INHALATION) EVERY 6 HOURS PRN
Status: DISCONTINUED | OUTPATIENT
Start: 2024-05-25 | End: 2024-05-28 | Stop reason: HOSPADM

## 2024-05-25 RX ORDER — FOLIC ACID 1 MG/1
1 TABLET ORAL DAILY
Status: DISCONTINUED | OUTPATIENT
Start: 2024-05-25 | End: 2024-05-28 | Stop reason: HOSPADM

## 2024-05-25 RX ORDER — PANTOPRAZOLE SODIUM 40 MG/1
40 TABLET, DELAYED RELEASE ORAL
Status: DISCONTINUED | OUTPATIENT
Start: 2024-05-25 | End: 2024-05-28 | Stop reason: HOSPADM

## 2024-05-25 RX ADMIN — CHLORDIAZEPOXIDE HYDROCHLORIDE 25 MG: 25 CAPSULE ORAL at 21:10

## 2024-05-25 RX ADMIN — OLANZAPINE 20 MG: 10 TABLET, FILM COATED ORAL at 21:10

## 2024-05-25 RX ADMIN — FOLIC ACID 1 MG: 1 TABLET ORAL at 08:42

## 2024-05-25 RX ADMIN — PANTOPRAZOLE SODIUM 40 MG: 40 TABLET, DELAYED RELEASE ORAL at 08:42

## 2024-05-25 RX ADMIN — SERTRALINE HYDROCHLORIDE 100 MG: 50 TABLET ORAL at 08:42

## 2024-05-25 RX ADMIN — CHLORDIAZEPOXIDE HYDROCHLORIDE 25 MG: 25 CAPSULE ORAL at 08:42

## 2024-05-25 RX ADMIN — CHLORDIAZEPOXIDE HYDROCHLORIDE 25 MG: 25 CAPSULE ORAL at 16:07

## 2024-05-25 RX ADMIN — THIAMINE HCL TAB 100 MG 100 MG: 100 TAB at 08:42

## 2024-05-25 NOTE — BH NOTE
INITIAL PSYCHIATRIC EVALUATION            IDENTIFICATION:    Patient Name  Rudi Levine Jr.   Date of Birth 1985   [facility] [id number]   Medical Record Number  [id number]      Age  39 y.o.   PCP Eliana Garcia MD   Admit date:  5/24/2024    Room Number  238/01  @ [facility]   Date of Service  5/25/2024            HISTORY         REASON FOR HOSPITALIZATION:    CC: Patient admitted under TDO, verbal altercation with sister and sister's boyfriend whom he was homicidal towards.  Patient also suicidal with reported to knives into session alcohol abuse       HISTORY OF PRESENT ILLNESS:     The patient, Rudi Levine Jr., is a 39 y.o. male admitted to the behavioral health floor after patient has had a verbal altercation with his sister and sister's boyfriend whom he had verbalized homicidal ideations.  The patient expresses calling the police upon himself and when police arrived he was reported becoming aggressive and had expressed suicidal ideations.  Patient was also noted to have had 2 nights in the session.  Patient also was noted abusing alcohol and blood alcohol level of 163 at admission.  Patient admitted under TDO.    Patient lives with his mom, his sister and her boyfriend and his brothers.  Patient is on disability for his schizophrenia bipolar disorder.  Patient reported with alcoholic gastritis and hepatitis B positive.  Patient also has been drinking a lot recently.  Compliance with medications not known.    Patient seen this morning who expresses not having any active withdrawal.  He reports he was able to rest last night.  He denies having any command auditory or visual hallucinations.  States he is not feeling angry this morning.  Denies having any active suicidal thoughts or homicidal plans.  He is still angry at his sister and his boyfriend.    PAST PSYCHIATRIC HISTORY:   History of schizophrenia, bipolar disorder, PTSD, alcohol abuse/dependence    Medical history as below    TRAUMA

## 2024-05-25 NOTE — GROUP NOTE
Group Therapy Note    Date: 5/25/2024    Group Start Time: 1045  Group End Time: 1130  Group Topic: Education Group - Inpatient    SSR 2  NON ACUTE    Danya Garnett        Group Therapy Note    Attendees: 6/8    Facilitated structured group to identify triggers, impact of triggers on mental health, and positive ways to manage triggers.          Patient's Goal:  Client will learn and demonstrate effective coping skills     Notes:  PT attended group discussion and was able to identify personal triggers. PT related the impact and effect that the trigger has on personal wellbeing/mental health. PT was able to verbalize he is triggered when \"people are rude\". PT also reports use of alcohol triggers him to be out of control.Pt stated he uses marijuana as an alternative because it helps him feel calm. Unable to identify positive coping without the use of substances but when prompted related music was way to feel relaxed.     Status After Intervention:  Improved    Participation Level: Active Listener    Participation Quality: Appropriate      Speech:  normal      Thought Process/Content: Logical      Affective Functioning: Congruent      Mood:  calm      Level of consciousness:  Alert      Response to Learning: Progressing to goal      Endings: None Reported    Modes of Intervention: Education and Support      Discipline Responsible: Recreational Therapist      Signature:  DIMITRIOS Roberson

## 2024-05-25 NOTE — GROUP NOTE
Group Therapy Note    Date: 5/25/2024    Group Start Time: 1515  Group End Time: 1600  Group Topic: Recreational    SSR 2 BH NON ACUTE    Danya Garnett        Group Therapy Note    Attendees: 5/8     Recreational Therapist facilitated structured leisure skills group to introduce healthy leisure skills as positive way to cope and manage mood.          Notes:  Did not attend group despite encouragement    Discipline Responsible: Recreational Therapist      Signature:  DIMITRIOS Roberson

## 2024-05-25 NOTE — BH NOTE
Patient visible on unit. Alert & oriented. Admitted under a TDO, to be heard Tuesday, 5.28.24. Pleasant on approach. He accepts medications as scheduled with no side effects reported. CIWAA = 0. He is up for meals. He is wearing a green gown, appearance is semi-tidy.  He consumes 100% of meals. No physical complaints. Denies thoughts of harm to others, and denies thoughts of harm to the individuals he identified on admission. Plan of care ongoing. Q15 minute checks maintained.

## 2024-05-26 PROCEDURE — 6370000000 HC RX 637 (ALT 250 FOR IP): Performed by: EMERGENCY MEDICINE

## 2024-05-26 PROCEDURE — 1240000000 HC EMOTIONAL WELLNESS R&B

## 2024-05-26 PROCEDURE — 6370000000 HC RX 637 (ALT 250 FOR IP): Performed by: PSYCHIATRY & NEUROLOGY

## 2024-05-26 RX ADMIN — CHLORDIAZEPOXIDE HYDROCHLORIDE 25 MG: 25 CAPSULE ORAL at 08:45

## 2024-05-26 RX ADMIN — CHLORDIAZEPOXIDE HYDROCHLORIDE 25 MG: 25 CAPSULE ORAL at 15:10

## 2024-05-26 RX ADMIN — PANTOPRAZOLE SODIUM 40 MG: 40 TABLET, DELAYED RELEASE ORAL at 07:26

## 2024-05-26 RX ADMIN — SERTRALINE HYDROCHLORIDE 100 MG: 50 TABLET ORAL at 08:45

## 2024-05-26 RX ADMIN — THIAMINE HCL TAB 100 MG 100 MG: 100 TAB at 08:46

## 2024-05-26 RX ADMIN — FOLIC ACID 1 MG: 1 TABLET ORAL at 08:46

## 2024-05-26 RX ADMIN — CHLORDIAZEPOXIDE HYDROCHLORIDE 25 MG: 25 CAPSULE ORAL at 20:58

## 2024-05-26 RX ADMIN — OLANZAPINE 20 MG: 10 TABLET, FILM COATED ORAL at 20:58

## 2024-05-26 NOTE — GROUP NOTE
Group Therapy Note    Date: 5/26/2024    Group Start Time: 1710  Group End Time: 1800  Group Topic: Recreational    SSR 2 BH NON ACUTE    Danya Garnett        Group Therapy Note    Attendees: 6/7     Recreational Therapist facilitated structured leisure skills group to introduce healthy leisure skills as positive way to cope and manage mood.        Patient's Goal:    Client will learn and demonstrate effective coping skills     Notes:  Attended group and listened to songs with peers.  Pt was receptive to intervention and responded to prompts from staff.     Status After Intervention:  Improved    Participation Level: Active Listener    Participation Quality: Appropriate      Speech:  normal      Thought Process/Content: Logical      Affective Functioning: Congruent      Mood:  calm       Level of consciousness:  Alert      Response to Learning: Progressing to goal      Endings: None Reported    Modes of Intervention: Activity      Discipline Responsible: Recreational Therapist      Signature:  DIMITRIOS Roberson

## 2024-05-26 NOTE — BH NOTE
Patient is currently awake, up showering. He is pleasant, and cooperative to staff, smiling affect is brighter, and friendly to nurse. He was med compliant but did not require any prn medications. He did not have any complaints of alcohol withdrawal this shift. Pt slept well for the first part of night and then laid back down after showering. No distress noted, will continue to monitor patient closely every 15 mins as per unit protocol.

## 2024-05-26 NOTE — GROUP NOTE
Group Therapy Note    Date: 5/26/2024    Group Start Time: 1015  Group End Time: 1040  Group Topic: Nursing    SSR 2 [facility] ACUTE    Lexi Benton RN        Group Therapy Note    Attendees: 2       Patient's Goal:  invited to group but declined    Notes:      Status After Intervention:    Participation Level:     Participation Quality:       Speech:        Thought Process/Content:       Affective Functioning:       Mood:       Level of consciousness:        Response to Learning:       Endings:     Modes of Intervention:       Discipline Responsible:       Signature:  Lexi Benton RN

## 2024-05-26 NOTE — GROUP NOTE
Group Therapy Note    Date: 5/26/2024    Group Start Time: 0930  Group End Time: 1030  Group Topic: Education Group - Inpatient    SSR 2  NON ACUTE    Danya Garnett        Group Therapy Note    Attendees: 5/6    Facilitated structured group discussion to identify protective factors that have been valuable and protective factors that could be improved in managing stressors.         Patient's Goal:  Client will learn and demonstrate effective coping skills     Notes:  Attended group discussion related to Protective Factors. Pt reviewed Protective Factors and was able to rate the level of strength in those factors in relation to managing stress.  Pt was cooperative and receptive to intervention. Pt stated  he has a supportive mom.     Status After Intervention:  Improved    Participation Level: Active Listener    Participation Quality: Appropriate      Speech:  normal      Thought Process/Content: Logical      Affective Functioning: Congruent      Mood:  calm      Level of consciousness:  Alert      Response to Learning: Progressing to goal      Endings: None Reported    Modes of Intervention: Education and Support      Discipline Responsible: Recreational Therapist      Signature:  DIMITRIOS Roberson

## 2024-05-26 NOTE — PROGRESS NOTES
Progress Note  Date:2024       Room:[facility]  Patient Name:Rudi Levine Jr.     YOB: 1985     Age:39 y.o.        Subjective    Subjective  Patient has been compliant with his medications.  Has not had any active homicidal thoughts or feelings.  Has not been aggressive.  He still remains to himself.  Has been engaging in few group therapy.  Review of Systems  Objective         Vitals Last 24 Hours:  TEMPERATURE:  Temp  Av °F (36.7 °C)  Min: 97.9 °F (36.6 °C)  Max: 98.1 °F (36.7 °C)  RESPIRATIONS RANGE: Resp  Av  Min: 16  Max: 18  PULSE OXIMETRY RANGE: No data recorded  PULSE RANGE: Pulse  Av  Min: 90  Max: 92  BLOOD PRESSURE RANGE: Systolic (24hrs), Av , Min:117 , Max:120   ; Diastolic (24hrs), Av, Min:78, Max:87    I/O (24Hr):  No intake or output data in the 24 hours ending 24 1053  Objective  Labs/Imaging/Diagnostics    Labs:  CBC:  Recent Labs     24  1303   WBC 5.4   RBC 4.31   HGB 14.1   HCT 41.8   MCV 97.0   RDW 12.6        CHEMISTRIES:  Recent Labs     24  1303      K 3.5   *   CO2 21   BUN 10   CREATININE 1.08   GLUCOSE 91     PT/INR:No results for input(s): \"PROTIME\", \"INR\" in the last 72 hours.  APTT:No results for input(s): \"APTT\" in the last 72 hours.  LIVER PROFILE:  Recent Labs     24  1303   AST 39*   ALT 61   BILITOT 0.6   ALKPHOS 98       Imaging Last 24 Hours:  No results found.  Assessment//Plan           Hospital Problems             Last Modified POA    * (Principal) Bipolar disorder (HCC) 2024 Yes    Bipolar 1 disorder (HCC) 2024 Yes     Assessment & Plan  Patient safety reviewed  No active SI HI voiced.  No command auditory or visual hallucinations voiced.  No dystonic symptoms.      Electronically signed by Melba Curiel MD on 24 at 10:53 AM EDT

## 2024-05-26 NOTE — GROUP NOTE
Group Therapy Note    Date: 5/26/2024    Group Start Time: 1315  Group End Time: 1400  Group Topic: Relaxation    SSR 2 BH NON ACUTE    Danya Garnett        Group Therapy Note    Attendees: 4/6    Facilitated structured group to identify positive ways to cope and manage daily stressors. Introduced relaxation (Guided Relaxation) technique to introduce healthy way to cope and manage stressors in group        Patient's Goal:  Client will learn and demonstrate effective coping skills     Notes:  Attended group and actively participated. Pt was receptive to intervention and verbalized the intervention is a positive way to relax. Verbalized enjoyment.         Status After Intervention:  Improved    Participation Level: Active Listener    Participation Quality: Appropriate      Speech:  normal      Thought Process/Content: Logical      Affective Functioning: Congruent      Mood:  calm       Level of consciousness:  Alert      Response to Learning: Progressing to goal      Endings: None Reported    Modes of Intervention: Activity      Discipline Responsible: Recreational Therapist      Signature:  DIMITRIOS Roberson

## 2024-05-26 NOTE — BH NOTE
Pt.is up and visible on unit, affect is flat,pleasant upon approach,denies feeling suicidal ,mood is depressed,denies hallucinations at present, no aggressive behaviors at present.attending groups,no signs of ETOH withdrawals, CIWA  score is 2.no other concerns voiced,remains on close observation.

## 2024-05-27 PROCEDURE — 6370000000 HC RX 637 (ALT 250 FOR IP): Performed by: EMERGENCY MEDICINE

## 2024-05-27 PROCEDURE — 6370000000 HC RX 637 (ALT 250 FOR IP): Performed by: PSYCHIATRY & NEUROLOGY

## 2024-05-27 PROCEDURE — 1240000000 HC EMOTIONAL WELLNESS R&B

## 2024-05-27 RX ADMIN — PANTOPRAZOLE SODIUM 40 MG: 40 TABLET, DELAYED RELEASE ORAL at 06:06

## 2024-05-27 RX ADMIN — FOLIC ACID 1 MG: 1 TABLET ORAL at 08:15

## 2024-05-27 RX ADMIN — HYDROXYZINE HYDROCHLORIDE 50 MG: 50 TABLET, FILM COATED ORAL at 06:16

## 2024-05-27 RX ADMIN — SERTRALINE HYDROCHLORIDE 100 MG: 50 TABLET ORAL at 08:15

## 2024-05-27 RX ADMIN — CHLORDIAZEPOXIDE HYDROCHLORIDE 25 MG: 25 CAPSULE ORAL at 08:15

## 2024-05-27 RX ADMIN — CHLORDIAZEPOXIDE HYDROCHLORIDE 25 MG: 25 CAPSULE ORAL at 15:27

## 2024-05-27 RX ADMIN — CHLORDIAZEPOXIDE HYDROCHLORIDE 25 MG: 25 CAPSULE ORAL at 21:34

## 2024-05-27 RX ADMIN — OLANZAPINE 20 MG: 10 TABLET, FILM COATED ORAL at 21:34

## 2024-05-27 RX ADMIN — THIAMINE HCL TAB 100 MG 100 MG: 100 TAB at 08:15

## 2024-05-27 NOTE — BH NOTE
Alert and oriented x 4. Affect and mood are congruent. Denies SI/HI/AVH at this time. Medication and meal compliant. Rates depression 4/10 and anxiety 2/10. Encouraged to attend groups and increase visibility on unit.

## 2024-05-27 NOTE — BH NOTE
Patient remains on close observation.  Patient was alert, oriented, cooperative and pleasant when staff approached for the therapeutic interview.  He was up for snack.  Otherwise, he has been in bed all shift.  Patient denied depression, anxiety, SI or HI.  Medication compliant.  Continue to assess.    0425--patient is pacing the unit.    0610--Patient has been slowly pacing the floor the majority of the time he has been awake.    He presents as angry about being in the hospital.  He requested medications to \"calm me down.\"

## 2024-05-27 NOTE — GROUP NOTE
Group Therapy Note    Date: 5/27/2024    Group Start Time: 0930  Group End Time: 1015  Group Topic: Education Group - Inpatient    SSR 2 BH NON ACUTE    Danya Garnett        Group Therapy Note    Attendees: 3/7    Facilitated structured group discussion The Stress Cycle. Pt encouraged to identify stressors, symptoms of stress and positive coping skills.       Patient's Goal:  Client will learn and demonstrate effective coping skills     Notes:  Attended group and participated in group discussion with prompts and encouragement from staff. PT was cooperative and receptive to intervention and received materials provided.  Verbalized stressors, and recognized symptoms of stress. Pt was also able to identify past negative coping skills and verbalize positive alternatives.  Pt reports mother is his support. Has used alcohol in the past.     Status After Intervention:  Improved    Participation Level: Active Listener    Participation Quality: Appropriate      Speech:  normal      Thought Process/Content: Logical      Affective Functioning: Congruent      Mood:  calm       Level of consciousness:  Alert      Response to Learning: Progressing to goal      Endings: None Reported    Modes of Intervention: Education      Discipline Responsible: Recreational Therapist      Signature:  DIMITRIOS Roberson

## 2024-05-27 NOTE — GROUP NOTE
Group Therapy Note    Date: 5/27/2024    Group Start Time: 1620  Group End Time: 1700  Group Topic: Nursing    SSR 2 BH NON ACUTE    Ragini King RN        Group Therapy Note    Attendees: 6/8       Patient's Goal:  Get my anger under control     Notes:  Discussed with pt plans to take a second to step away from situation and control emotions     Status After Intervention:  Improved    Participation Level: Active Listener    Participation Quality: Appropriate      Speech:  normal      Thought Process/Content: Logical      Discipline Responsible: Registered Nurse      Signature:  Ragini King RN

## 2024-05-27 NOTE — BH NOTE
PSYCHOSOCIAL ASSESSMENT  :Patient identifying info:   Rudi Levine Jr. is a 39 y.o., male admitted 5/24/2024 12:26 PM     Presenting problem and precipitating factors: Pt reports his biggest stressor was moving and he was the \"leader\" of it all. He got into an argument with his sister's boyfriend but states they both cooled off and it is resolved. Pt said he's ready to get group therapy to have something to do all day and individual therapy for PTSD/trauma. He said, \"the memories make me angry.\"    Mental status assessment: Pt presents calm and cooperative with pleasant demeanor. Pt has fair insight and judgement. His speech and language is clear. No evidence of perceptual disturbances. Pt denies SI/HI/AVH and is motivated for treatment.    Strengths/Recreation/Coping Skills:My kids    Collateral information: Mom Cami Ochoa [phone]    Current psychiatric /substance abuse providers and contact info: Damaris Patel [facility]    Previous psychiatric/substance abuse providers and response to treatment: Pt was hospitalized a few times at [facility].    Family history of mental illness or substance abuse: unk    Substance abuse history:  Tox + alcohol and THC  Social History     Tobacco Use    Smoking status: Every Day     Current packs/day: 0.50     Average packs/day: 0.5 packs/day for 20.0 years (10.0 ttl pk-yrs)     Types: Cigarettes    Smokeless tobacco: Never   Substance Use Topics    Alcohol use: Not Currently       History of biomedical complications associated with substance abuse: n/a    Patient's current acceptance of treatment or motivation for change: Pt would like therapy for trauma/PTSD and possibly medication adjustment    Family constellation: Pt is single and has 3 children a son 17, daughter 16 and a son 12    Is significant other involved? No    Describe support system: Mother    Describe living arrangements and home environment: Pt lives with his mother, sister and 2

## 2024-05-27 NOTE — PROGRESS NOTES
Progress Note  Date:2024       Room:[facility]  Patient Name:Rudi Levine Jr.     YOB: 1985     Age:39 y.o.        Subjective    Subjective   Patient has been continuing to make progress.  He is able to engage in conversation.  He states he is not having any suicidal thoughts or homicidal feelings towards his sister's boyfriend or his sister.  He states he did not have any problem with his sister.  He states he is ready to forgive.  Has been compliant with medications and no command hallucinations.  Review of Systems  Objective         Vitals Last 24 Hours:  TEMPERATURE:  Temp  Av.3 °F (36.8 °C)  Min: 98 °F (36.7 °C)  Max: 98.4 °F (36.9 °C)  RESPIRATIONS RANGE: Resp  Av.7  Min: 16  Max: 18  PULSE OXIMETRY RANGE: No data recorded  PULSE RANGE: Pulse  Av.7  Min: 72  Max: 101  BLOOD PRESSURE RANGE: Systolic (24hrs), Av , Min:113 , Max:124   ; Diastolic (24hrs), Av, Min:83, Max:94    I/O (24Hr):  No intake or output data in the 24 hours ending 24 1302  Objective  Labs/Imaging/Diagnostics    Labs:  CBC:  Recent Labs     24  1303   WBC 5.4   RBC 4.31   HGB 14.1   HCT 41.8   MCV 97.0   RDW 12.6        CHEMISTRIES:  Recent Labs     24  1303      K 3.5   *   CO2 21   BUN 10   CREATININE 1.08   GLUCOSE 91     PT/INR:No results for input(s): \"PROTIME\", \"INR\" in the last 72 hours.  APTT:No results for input(s): \"APTT\" in the last 72 hours.  LIVER PROFILE:  Recent Labs     24  1303   AST 39*   ALT 61   BILITOT 0.6   ALKPHOS 98       Imaging Last 24 Hours:  No results found.  Assessment//Plan           Hospital Problems             Last Modified POA    * (Principal) Bipolar disorder (HCC) 2024 Yes    Bipolar 1 disorder (HCC) 2024 Yes     Assessment & Plan  No dystonic symptoms  Continue current medications and therapy  Case management to continue to address psychosocial stressors and family meeting when appropriate.    Electronically

## 2024-05-27 NOTE — GROUP NOTE
Group Therapy Note    Date: 5/27/2024    Group Start Time: 1315  Group End Time: 1400  Group Topic: Recreational    SSR 2 BH NON ACUTE    Danya Garnett        Group Therapy Note    Attendees: 6/7    Recreational Therapist facilitated structured leisure skills group to introduce healthy leisure skills as positive way to cope and manage mood.         Patient's Goal:  Client will learn and demonstrate effective coping skills     Notes: Attended group and listened to songs with peers.  Pt was receptive to intervention and responded to prompts from staff.     Status After Intervention:  Improved    Participation Level: Active Listener    Participation Quality: Appropriate      Speech:  normal      Thought Process/Content: Logical      Affective Functioning: Congruent      Mood:  Calm       Level of consciousness:  Alert      Response to Learning: Progressing to goal      Endings: None Reported    Modes of Intervention: Activity      Discipline Responsible: Recreational Therapist      Signature:  DIMITRIOS Roberson

## 2024-05-27 NOTE — CARE COORDINATION
05/27/24 1235   ITP   Date of Plan 05/27/24   Date of Next Review 06/03/24   Primary Diagnosis Code Bipolar disorder (HCC) F31.9   Barriers to Treatment Need for psychoeducation   Strengths Incorporated in Plan Acknowledging need for assistance;Family supports   Plan of Care   Long Term Goal (LTG) Stated in patient/guardian terms \"I want group therapy and therapy for PTSD\"   Short Term Goal 1   Short Term Goal 1 Client will participate in TFCBT treatment and education process   Baseline Functioning Pt has significant trauma   Target Pt will learn and practice interventions learned during TFCBT treatment and education process   Objectives Client will participate in group therapy   Intervention 1 Group therapy   Frequency Daily   Measured by Behavioral data;Self report;Staff observation   Staff Responsible [facility] staff;Clinical staff   Intervention 2 Assess safety   Frequency Daily   Measured by Behavioral data;Self report;Staff observation   Staff Responsible [facility] staff;Clinical staff   Intervention 3 Acknowledge client strengths   Frequency Daily   Measured by Behavioral data;Self report;Staff observation   Staff Responsible [facility] staff;Clinical staff   STG Goal 1 Status: Patient Appears to be  Progressing toward treatment plan goal   Short Term Goal 2   Short Term Goal 2 Client will reduce frequency and intensity of unsafe behavior   Baseline Functioning Pt uses threats to harm self and others when angry/overwhelmed   Target Pt will reduce the intensity of unsafe behavior through adaptive coping strategies in therapy   Objectives Client will participate in group therapy   Intervention 1 Milieu therapy and support   Frequency Daily   Measured by Behavioral data;Self report;Staff observation   Staff Responsible [facility] staff;Clinical staff   Intervention 2 Monitor medications   Frequency Daily   Measured by Behavioral data;Self report;Staff observation   Staff Responsible [facility] staff;Clinical staff   Intervention 3

## 2024-05-28 VITALS
OXYGEN SATURATION: 100 % | DIASTOLIC BLOOD PRESSURE: 79 MMHG | HEIGHT: 75 IN | HEART RATE: 72 BPM | WEIGHT: 214.95 LBS | RESPIRATION RATE: 18 BRPM | TEMPERATURE: 98.3 F | BODY MASS INDEX: 26.73 KG/M2 | SYSTOLIC BLOOD PRESSURE: 131 MMHG

## 2024-05-28 PROCEDURE — 6370000000 HC RX 637 (ALT 250 FOR IP): Performed by: PSYCHIATRY & NEUROLOGY

## 2024-05-28 PROCEDURE — 6370000000 HC RX 637 (ALT 250 FOR IP): Performed by: EMERGENCY MEDICINE

## 2024-05-28 RX ORDER — CHLORDIAZEPOXIDE HYDROCHLORIDE 10 MG/1
10 CAPSULE, GELATIN COATED ORAL 3 TIMES DAILY PRN
Status: DISCONTINUED | OUTPATIENT
Start: 2024-05-28 | End: 2024-05-28 | Stop reason: HOSPADM

## 2024-05-28 RX ORDER — OLANZAPINE 20 MG/1
20 TABLET ORAL NIGHTLY
Qty: 30 TABLET | Refills: 1 | Status: SHIPPED | OUTPATIENT
Start: 2024-05-28

## 2024-05-28 RX ORDER — SERTRALINE HYDROCHLORIDE 100 MG/1
100 TABLET, FILM COATED ORAL DAILY
Qty: 30 TABLET | Refills: 1 | Status: SHIPPED | OUTPATIENT
Start: 2024-05-29

## 2024-05-28 RX ORDER — TRAZODONE HYDROCHLORIDE 50 MG/1
50 TABLET ORAL NIGHTLY PRN
Qty: 30 TABLET | Refills: 1 | Status: SHIPPED | OUTPATIENT
Start: 2024-05-28

## 2024-05-28 RX ADMIN — FOLIC ACID 1 MG: 1 TABLET ORAL at 08:20

## 2024-05-28 RX ADMIN — THIAMINE HCL TAB 100 MG 100 MG: 100 TAB at 08:20

## 2024-05-28 RX ADMIN — SERTRALINE HYDROCHLORIDE 100 MG: 50 TABLET ORAL at 08:20

## 2024-05-28 RX ADMIN — PANTOPRAZOLE SODIUM 40 MG: 40 TABLET, DELAYED RELEASE ORAL at 06:09

## 2024-05-28 NOTE — BH NOTE
Behavioral Health Transition Record to Provider    Patient Name: Rudi Levine Jr.  YOB: 1985  Medical Record Number: [id number]  Date of Admission: 5/24/2024  Date of Discharge: 5/28/2024    Attending Provider: Melba Curiel MD  Discharging Provider: Dr. Curiel  To contact this individual call [phone] and ask the  to page.  If unavailable, ask to be transferred to Behavioral Health Provider on call.  A Behavioral Health Provider will be available on call 24/7 and during holidays.    Primary Care Provider: Eliana Garcia MD    Allergies   Allergen Reactions    Latex      Other reaction(s): Unknown (comments)    Ibuprofen      Other reaction(s): Not Reported This Time    Macadamia Nut Oil Swelling     Allergy to all nuts per pt       Milk (Cow)      Other reaction(s): Not Reported This Time, lactose intolerant per pt     Shellfish Allergy      Other reaction(s): Unknown (comments)       Reason for Admission: Thoughts to harm self and another    Admission Diagnosis: Suicidal ideation [R45.851]  Bipolar disorder (HCC) [F31.9]  Homicidal behavior [R45.850]  Bipolar 1 disorder (HCC) [F31.9]    * No surgery found *    Results for orders placed or performed during the hospital encounter of 05/24/24   Urine Drug Screen   Result Value Ref Range    Amphetamine, Urine Negative Negative      Barbiturates, Urine Negative Negative      Benzodiazepines, Urine Negative Negative      Cocaine, Urine Negative Negative      Methadone, Urine Negative Negative      Opiates, Urine Negative Negative      Phencyclidine, Urine Negative Negative      THC, TH-Cannabinol, Urine Positive (A) Negative      Comments:        This test is a screen for drugs of abuse in a medical setting only (i.e., they are unconfirmed results and as such must not be used for non-medical purposes, e.g.,employment testing, legal testing). Due to its inherent nature, false positive (FP) and false negative (FN) results may be obtained.

## 2024-05-28 NOTE — CONSULTS
Consult    Subjective:     Patient is a 39 y.o. year old male anxiety disorder bipolar disorder history of alcoholic cirrhosis depression gastritis admitted to the psych floor because of depression and suicidal ideation patient denies any chest pain shortness of breath nausea vomit diarrhea constipation no fever no chills    Patient smokes cigarettes and drink alcohol smoke weeks    5/28    Patient alert awake denies any chest pain shortness of or nausea no vomiting        Past Medical History:   Diagnosis Date    Anxiety     Everett's esophagus 6/11/2020    Bipolar 1 disorder (HCC)     Cannabis abuse 6/24/2020    Cirrhosis, alcoholic (HCC)     Depression     Gastritis 6/24/2020    GERD (gastroesophageal reflux disease)     Perianal pain 6/11/2020    Thyroid disease     Thyroid function test abnormal 6/24/2020      Past Surgical History:   Procedure Laterality Date    COLONOSCOPY      GI      Biopsy of liver    UPPER GASTROINTESTINAL ENDOSCOPY      UPPER GASTROINTESTINAL ENDOSCOPY N/A 10/3/2023    EGD ESOPHAGOGASTRODUODENOSCOPY performed by Shonna Greene MD at [facility] ENDOSCOPY    US GUIDED LIVER BIOPSY PERCUTANEOUS  7/19/2017    US GUIDED LIVER BIOPSY PERCUTANEOUS 7/19/2017 [facility] RAD US     Family History   Problem Relation Age of Onset    Cancer Maternal Grandmother     Cancer Father     Asthma Mother     Diabetes Mother     Hypertension Mother       Social History     Tobacco Use    Smoking status: Every Day     Current packs/day: 0.50     Average packs/day: 0.5 packs/day for 20.0 years (10.0 ttl pk-yrs)     Types: Cigarettes    Smokeless tobacco: Never   Substance Use Topics    Alcohol use: Not Currently       Current Facility-Administered Medications   Medication Dose Route Frequency Provider Last Rate Last Admin    chlordiazePOXIDE (LIBRIUM) capsule 10 mg  10 mg Oral TID PRN Melba Curiel MD        thiamine mononitrate tablet 100 mg  100 mg Oral Daily Melba Curiel MD   100 mg at 05/28/24 0820    pantoprazole 
albuterol sulfate HFA (PROVENTIL;VENTOLIN;PROAIR) 108 (90 Base) MCG/ACT inhaler 2 puff  2 puff Inhalation Q6H PRN Melba Curiel MD        sertraline (ZOLOFT) tablet 100 mg  100 mg Oral Daily Reuben Lawrence MD   100 mg at 05/26/24 0845    OLANZapine (ZYPREXA) tablet 20 mg  20 mg Oral Nightly Reuben Lawrence MD   20 mg at 05/25/24 2110    acetaminophen (TYLENOL) tablet 650 mg  650 mg Oral Q4H PRN Melba Curiel MD        hydrOXYzine HCl (ATARAX) tablet 50 mg  50 mg Oral TID PRN Melba Curiel MD        traZODone (DESYREL) tablet 50 mg  50 mg Oral Nightly PRN Melba Curiel MD        magnesium hydroxide (MILK OF MAGNESIA) 400 MG/5ML suspension 30 mL  30 mL Oral Daily PRN Melba Curiel MD        aluminum & magnesium hydroxide-simethicone (MAALOX) 200-200-20 MG/5ML suspension 30 mL  30 mL Oral Q6H PRN Melba Curiel MD        chlordiazePOXIDE (LIBRIUM) capsule 25 mg  25 mg Oral TID Melba Curiel MD   25 mg at 05/26/24 0845    nicotine (NICODERM CQ) 14 MG/24HR 1 patch  1 patch TransDERmal Daily Melba Curiel MD   1 patch at 05/26/24 0846    cloNIDine (CATAPRES) tablet 0.1 mg  0.1 mg Oral TID PRN Melba Curiel MD            Allergies   Allergen Reactions    Latex      Other reaction(s): Unknown (comments)    Ibuprofen      Other reaction(s): Not Reported This Time    Macadamia Nut Oil Swelling     Allergy to all nuts per pt       Milk (Cow)      Other reaction(s): Not Reported This Time, lactose intolerant per pt     Shellfish Allergy      Other reaction(s): Unknown (comments)        Review of Systems:  Constitutional: Negative for chills and fever.   HENT: Negative.    Eyes: Negative.    Respiratory: Negative.    Cardiovascular: Negative.    Gastrointestinal: Negative for abdominal pain and nausea.   Skin: Negative.    Neurological: Negative.      Objective:     Intake and Output:    No intake/output data recorded.  No intake/output data recorded.    Physical Exam:   Constitutional: pt is oriented to person, place, and time.   HENT:   Head:

## 2024-05-28 NOTE — BH NOTE
Behavioral Health Treatment Team Note     Patient goal(s) for today: \"to go to groups\"  Treatment team focus/goals: continue medication management, group therapy, maintain ADLs and provide a safe discharge    Progress note: Pt presented with a flat affect and \"ok\" mood. Pt denied any SI/HI/Avh at the time and was pleasant to speak with. Pt reported that he has a difficult time communicating and dealing with his past trauma and would like to be connected with outpatient group therapy. Writer sent in a referral to PHP. Pt shared that moving was hard for him and felt that it was all on him to deal with the move. Pt said that he would like to work on coping with his past trauma. Writer provided pt a worksheet packet on trauma and will go over it together tomorrow. An inpatient level of care is needed to further stabilize the pt on medications.     Pt is scheduled to start PHP on 6-3- at 0830    LOS:  4  Expected LOS: hearing today     Insurance info/prescription coverage:  [place] [employer]   Date of last family contact:  none as of today  Family requesting physician contact today:  No  Discharge plan:  to stabilize the pt   Guns in the home:  No   Outpatient provider(s):  Damaris Patel [facility]    Participating treatment team members: Rudi Levine Jr., * (assigned SW), Melanie Lovell LMSW

## 2024-05-28 NOTE — GROUP NOTE
Group Therapy Note    Date: 5/28/2024    Group Start Time: 1100  Group End Time: 1130  Group Topic: Process Group - Inpatient    [facility]    Susan Aranda        Group Therapy Note: The emotion of \"anxiety\" was discussed along with it's triggers and positive coping skills. Each individual was also provided with a worksheet on anxiety to work on during their own time. After processing the topic at hand a game was played using the \"coping ball\" to further explore positive coping skills.     Attendees: 6       Patient's Goal:  to attend groups    Notes:  Pt stated that deep breathing and going to sleep or taking a nap helps him cope with anxiety, he did not report a particular trigger.     Status After Intervention:  Improved    Participation Level: Active Listener and Interactive    Participation Quality: Appropriate, Attentive, Sharing, and Supportive      Speech:  normal      Thought Process/Content: Logical  Linear      Affective Functioning: Congruent      Mood: calm      Level of consciousness:  Alert, Oriented x4, and Attentive      Response to Learning: Able to verbalize current knowledge/experience, Able to verbalize/acknowledge new learning, Able to retain information, Capable of insight, and Able to change behavior      Endings: None Reported    Modes of Intervention: Education, Support, and Socialization      Discipline Responsible: /Counselor      Signature:  Susan Aranda

## 2024-05-28 NOTE — BH NOTE
DISCHARGE NOTE:     Patient dismissed from hearing this evening. Dr. Curiel discharging. Discharge instructions reviewed and understood with the patient and patient verbalized understanding. Prescriptions reviewed and understood and electronically sent to [facility] on [address]. Valuables returned to the patient from the safe and the belongings closet. Patient being picked up by his mother and patient is waiting for mom to get here. Patient is happy to be discharging. Awaiting for mother to .     1902pm- Patient discharged with paperwork and all belongings. No complaints voiced.

## 2024-05-28 NOTE — GROUP NOTE
Group Therapy Note    Date: 5/28/2024    Group Start Time: 1415  Group End Time: 1445  Group Topic: Nursing    SSR 2  NON ACUTE    Tammy Mejia RN    Medication Compliance       Group Therapy Note    Attendees: 5       Patient's Goal:      Notes:  Patient participated in the group activity and in the group discussions regarding the importance of medication compliance. Patient verbalized understanding of remaining compliant with his prescribed medications.     Status After Intervention:  Unchanged    Participation Level: Active Listener and Interactive    Participation Quality: Appropriate, Attentive, and Supportive      Speech:  normal      Thought Process/Content:       Affective Functioning: Congruent      Mood: euphoric      Level of consciousness:  Alert      Response to Learning: Able to verbalize current knowledge/experience, Able to verbalize/acknowledge new learning, and Able to retain information      Endings: None Reported    Modes of Intervention: Education and Support      Discipline Responsible: Registered Nurse      Signature:  Tammy Mejia RN

## 2024-05-28 NOTE — PLAN OF CARE
Problem: Discharge Planning  Goal: Discharge to home or other facility with appropriate resources  Outcome: Progressing     Problem: Self Harm/Suicidality  Goal: Will have no self-injury during hospital stay  Description: INTERVENTIONS:  1.  Ensure constant observer at bedside with Q15M safety checks  2.  Maintain a safe environment  3.  Secure patient belongings  4.  Ensure family/visitors adhere to safety recommendations  5.  Ensure safety tray has been added to patient's diet order  6.  Every shift and PRN: Re-assess suicidal risk via Frequent Screener    5/27/2024 0918 by Starr Segura RN  Outcome: Progressing  5/26/2024 2337 by Young Glass RN  Outcome: Progressing     Problem: Depression  Goal: Will be euthymic at discharge  Description: INTERVENTIONS:  1. Administer medication as ordered  2. Provide emotional support via 1:1 interaction with staff  3. Encourage involvement in milieu/groups/activities  4. Monitor for social isolation  5/27/2024 0918 by Starr Segura RN  Outcome: Progressing  5/26/2024 2337 by Young Glass RN  Outcome: Progressing     Problem: Psychosis  Goal: Will report no hallucinations or delusions  Description: INTERVENTIONS:  1. Administer medication as  ordered  2. Assist with reality testing to support increasing orientation  3. Assess if patient's hallucinations or delusions are encouraging self harm or harm to others and intervene as appropriate  Outcome: Progressing     Problem: Behavior  Goal: Pt/Family maintain appropriate behavior and adhere to behavioral management agreement, if implemented  Description: INTERVENTIONS:  1. Assess patient/family's coping skills and  non-compliant behavior (including use of illegal substances)  2. Notify security of behavior or suspected illegal substances which indicate the need for search of the family and/or belongings  3. Encourage verbalization of thoughts and concerns in a socially appropriate manner  4. Utilize positive, 
  Problem: Self Harm/Suicidality  Goal: Will have no self-injury during hospital stay  Description: INTERVENTIONS:  1.  Ensure constant observer at bedside with Q15M safety checks  2.  Maintain a safe environment  3.  Secure patient belongings  4.  Ensure family/visitors adhere to safety recommendations  5.  Ensure safety tray has been added to patient's diet order  6.  Every shift and PRN: Re-assess suicidal risk via Frequent Screener    5/25/2024 1053 by Mony Wolff, RN  Outcome: Progressing  Flowsheets (Taken 5/25/2024 1043)  Will have no self-injury during hospital stay:   Ensure constant observer at bedside with Q15M safety checks   Maintain a safe environment   Secure patient belongings   Every shift and PRN: Re-assess suicidal risk via Frequent Screener  5/25/2024 0147 by Maycol Lim, RN  Outcome: Progressing     Problem: Depression  Goal: Will be euthymic at discharge  Description: INTERVENTIONS:  1. Administer medication as ordered  2. Provide emotional support via 1:1 interaction with staff  3. Encourage involvement in milieu/groups/activities  4. Monitor for social isolation  Outcome: Progressing     Problem: Psychosis  Goal: Will report no hallucinations or delusions  Description: INTERVENTIONS:  1. Administer medication as  ordered  2. Assist with reality testing to support increasing orientation  3. Assess if patient's hallucinations or delusions are encouraging self harm or harm to others and intervene as appropriate  Outcome: Progressing     Problem: Behavior  Goal: Pt/Family maintain appropriate behavior and adhere to behavioral management agreement, if implemented  Description: INTERVENTIONS:  1. Assess patient/family's coping skills and  non-compliant behavior (including use of illegal substances)  2. Notify security of behavior or suspected illegal substances which indicate the need for search of the family and/or belongings  3. Encourage verbalization of thoughts and concerns in 
  Problem: Self Harm/Suicidality  Goal: Will have no self-injury during hospital stay  Description: INTERVENTIONS:  1.  Ensure constant observer at bedside with Q15M safety checks  2.  Maintain a safe environment  3.  Secure patient belongings  4.  Ensure family/visitors adhere to safety recommendations  5.  Ensure safety tray has been added to patient's diet order  6.  Every shift and PRN: Re-assess suicidal risk via Frequent Screener    5/27/2024 2009 by Young Glass, RN  Outcome: Progressing  5/27/2024 0918 by Starr Segura RN  Outcome: Progressing     Problem: Depression  Goal: Will be euthymic at discharge  Description: INTERVENTIONS:  1. Administer medication as ordered  2. Provide emotional support via 1:1 interaction with staff  3. Encourage involvement in milieu/groups/activities  4. Monitor for social isolation  5/27/2024 2009 by Young Glass, RN  Outcome: Progressing     
  Problem: Self Harm/Suicidality  Goal: Will have no self-injury during hospital stay  Description: INTERVENTIONS:  1.  Ensure constant observer at bedside with Q15M safety checks  2.  Maintain a safe environment  3.  Secure patient belongings  4.  Ensure family/visitors adhere to safety recommendations  5.  Ensure safety tray has been added to patient's diet order  6.  Every shift and PRN: Re-assess suicidal risk via Frequent Screener    Outcome: Progressing     Problem: Depression  Goal: Will be euthymic at discharge  Description: INTERVENTIONS:  1. Administer medication as ordered  2. Provide emotional support via 1:1 interaction with staff  3. Encourage involvement in milieu/groups/activities  4. Monitor for social isolation  Outcome: Progressing     
  Problem: Self Harm/Suicidality  Goal: Will have no self-injury during hospital stay  Description: INTERVENTIONS:  1.  Ensure constant observer at bedside with Q15M safety checks  2.  Maintain a safe environment  3.  Secure patient belongings  4.  Ensure family/visitors adhere to safety recommendations  5.  Ensure safety tray has been added to patient's diet order  6.  Every shift and PRN: Re-assess suicidal risk via Frequent Screener    Outcome: Progressing     Problem: Depression  Goal: Will be euthymic at discharge  Description: INTERVENTIONS:  1. Administer medication as ordered  2. Provide emotional support via 1:1 interaction with staff  3. Encourage involvement in milieu/groups/activities  4. Monitor for social isolation  Outcome: Progressing     Problem: Behavior  Goal: Pt/Family maintain appropriate behavior and adhere to behavioral management agreement, if implemented  Description: INTERVENTIONS:  1. Assess patient/family's coping skills and  non-compliant behavior (including use of illegal substances)  2. Notify security of behavior or suspected illegal substances which indicate the need for search of the family and/or belongings  3. Encourage verbalization of thoughts and concerns in a socially appropriate manner  4. Utilize positive, consistent limit setting strategies supporting safety of patient, staff and others  5. Encourage participation in the decision making process about the behavioral management agreement  6. If a visitor's behavior poses a threat to safety call refer to organization policy.  7. Initiate consult with , Psychosocial CNS, Spiritual Care as appropriate  Outcome: Progressing     Problem: Anxiety  Goal: Will report anxiety at manageable levels  Description: INTERVENTIONS:  1. Administer medication as ordered  2. Teach and rehearse alternative coping skills  3. Provide emotional support with 1:1 interaction with staff  Outcome: Progressing     Problem: Drug 
  Problem: Self Harm/Suicidality  Goal: Will have no self-injury during hospital stay  Description: INTERVENTIONS:  1.  Ensure constant observer at bedside with Q15M safety checks  2.  Maintain a safe environment  3.  Secure patient belongings  4.  Ensure family/visitors adhere to safety recommendations  5.  Ensure safety tray has been added to patient's diet order  6.  Every shift and PRN: Re-assess suicidal risk via Frequent Screener    Outcome: Progressing     Problem: Sleep Disturbance  Goal: Will exhibit normal sleeping pattern  Description: INTERVENTIONS:  1. Administer medication as ordered  2. Decrease environmental stimuli, including noise, as appropriate  3. Discourage social isolation and naps during the day  Outcome: Progressing     Problem: Involuntary Admit  Goal: Will cooperate with staff recommendations and doctor's orders and will demonstrate appropriate behavior  Description: INTERVENTIONS:  1. Treat underlying conditions and offer medication as ordered  2. Educate regarding involuntary admission procedures and rules  3. Contain excessive/inappropriate behavior per unit and hospital policies  Outcome: Progressing     
Mony Abernathy RN  Outcome: Progressing  5/25/2024 1053 by Mony Wolff RN  Outcome: Progressing  Flowsheets (Taken 5/25/2024 1043)  Will report anxiety at manageable levels:   Administer medication as ordered   Teach and rehearse alternative coping skills   Provide emotional support with 1:1 interaction with staff     Problem: Behavior  Goal: Pt/Family maintain appropriate behavior and adhere to behavioral management agreement, if implemented  Description: INTERVENTIONS:  1. Assess patient/family's coping skills and  non-compliant behavior (including use of illegal substances)  2. Notify security of behavior or suspected illegal substances which indicate the need for search of the family and/or belongings  3. Encourage verbalization of thoughts and concerns in a socially appropriate manner  4. Utilize positive, consistent limit setting strategies supporting safety of patient, staff and others  5. Encourage participation in the decision making process about the behavioral management agreement  6. If a visitor's behavior poses a threat to safety call refer to organization policy.  7. Initiate consult with , Psychosocial CNS, Spiritual Care as appropriate  5/25/2024 1055 by Mony Wolff RN  Outcome: Progressing  5/25/2024 1053 by Mony Wolff RN  Outcome: Progressing  Flowsheets (Taken 5/25/2024 1043)  Patient/family maintains appropriate behavior and adheres to behavioral management agreement, if implemented:   Utilize positive, consistent limit setting strategies supporting safety of patient, staff and others   Encourage participation in the decision making process about the behavioral management agreement     Problem: Depression  Goal: Will be euthymic at discharge  Description: INTERVENTIONS:  1. Administer medication as ordered  2. Provide emotional support via 1:1 interaction with staff  3. Encourage involvement in milieu/groups/activities  4. Monitor for

## 2024-05-28 NOTE — BH NOTE
DISCHARGE SUMMARY    NAME:Rudi Levine Jr.  : 1985  MRN: [id number]    The patient Rudi Levine Jr. exhibits the ability to control behavior in a less restrictive environment.  Patient's level of functioning is improving.  No assaultive/destructive behavior has been observed for the past 24 hours.  No suicidal/homicidal threat or behavior has been observed for the past 24 hours.  There is no evidence of serious medication side effects.  Patient has not been in physical or protective restraints for at least the past 24 hours.    If weapons involved, how are they secured? N/a    Is patient aware of and in agreement with discharge plan? Yes    Arrangements for medication:  Prescriptions to pharmacy in chart    Copy of discharge instructions to provider?:  Yes    Arrangements for transportation home:  Pt's mother can pick him up.    Keep all follow up appointments as scheduled, continue to take prescribed medications per physician instructions.  Mental health crisis number:  [phone]      Mental Health Emergency WARM LINE      [phone] ([phone])      M-F: 9am to 9pm      Sat & Sun: 5pm - 9pm  National suicide prevention lines:                             6-359-AGGIPVO (1-906-375-5817)       1-656-414-TALK ([phone])    Crisis Text Line:  Text HOME to 827556

## 2024-05-28 NOTE — BH NOTE
Dr. Curiel notified in regards to librium order and patient not having any withdrawal symptoms. Librium changed to 10mg TID PRN.

## 2024-05-28 NOTE — BH NOTE
Patient remains on close observation.  Patient has been alert, oriented, cooperative and pleasant.  Patient was initially laying in bed at the beginning of the shift.  Patient said he was \"alright.\"  He said his mood was \"good.\"  He has not voiced depression, anxiety, SI, HI or hallucinations.  Patient has been medication compliant.  Continue to assess.

## 2024-05-28 NOTE — BH NOTE
DAYSHIFT NOTE:     Patient up this morning and walking around in the unit halls. Patient is pleasant on approach. Patient sits in the dayroom to eat his meals and watches TV and socializes with peers. Patient stated this morning that he had some anxiety in regards to his court hearing but other than that felt okay. Patient denies SI/HI. Denies AH/VH. Patient is medication compliant. Patient did not receive his librium this morning as he is not having any withdrawal symptoms. Librium order changed to PRN. Attends groups. Patient is anticipating his court hearing and is noted to walk around the unit halls. Close observations continued to ensure patient safety.

## 2024-07-14 ENCOUNTER — HOSPITAL ENCOUNTER (EMERGENCY)
Facility: HOSPITAL | Age: 39
Discharge: HOME | End: 2024-07-14
Payer: COMMERCIAL

## 2024-07-14 VITALS
RESPIRATION RATE: 18 BRPM | DIASTOLIC BLOOD PRESSURE: 87 MMHG | BODY MASS INDEX: 24.36 KG/M2 | WEIGHT: 200 LBS | HEART RATE: 64 BPM | TEMPERATURE: 97.8 F | SYSTOLIC BLOOD PRESSURE: 121 MMHG | OXYGEN SATURATION: 99 % | HEIGHT: 76 IN

## 2024-07-14 DIAGNOSIS — K85.20 ALCOHOL-INDUCED ACUTE PANCREATITIS WITHOUT INFECTION OR NECROSIS: Primary | ICD-10-CM

## 2024-07-14 DIAGNOSIS — F10.10 ALCOHOL ABUSE: ICD-10-CM

## 2024-07-14 DIAGNOSIS — K29.00 ACUTE GASTRITIS, PRESENCE OF BLEEDING UNSPECIFIED, UNSPECIFIED GASTRITIS TYPE: ICD-10-CM

## 2024-07-14 LAB
ALBUMIN SERPL-MCNC: 3.6 G/DL (ref 3.5–5)
ALBUMIN/GLOB SERPL: 1.1 (ref 1.1–2.2)
ALP SERPL-CCNC: 78 U/L (ref 45–117)
ALT SERPL-CCNC: 33 U/L (ref 12–78)
ANION GAP SERPL CALC-SCNC: 8 MMOL/L (ref 5–15)
APPEARANCE UR: CLEAR
AST SERPL W P-5'-P-CCNC: 31 U/L (ref 15–37)
BACTERIA URNS QL MICRO: NEGATIVE /HPF
BASOPHILS # BLD: 0.1 K/UL (ref 0–0.1)
BASOPHILS NFR BLD: 1 % (ref 0–1)
BILIRUB SERPL-MCNC: 0.5 MG/DL (ref 0.2–1)
BILIRUB UR QL: NEGATIVE
BUN SERPL-MCNC: 9 MG/DL (ref 6–20)
BUN/CREAT SERPL: 10 (ref 12–20)
CA-I BLD-MCNC: 8.8 MG/DL (ref 8.5–10.1)
CHLORIDE SERPL-SCNC: 109 MMOL/L (ref 97–108)
CO2 SERPL-SCNC: 23 MMOL/L (ref 21–32)
COLOR UR: NORMAL
CREAT SERPL-MCNC: 0.88 MG/DL (ref 0.7–1.3)
DIFFERENTIAL METHOD BLD: ABNORMAL
EKG ATRIAL RATE: 59 BPM
EKG DIAGNOSIS: NORMAL
EKG P AXIS: 57 DEGREES
EKG P-R INTERVAL: 140 MS
EKG Q-T INTERVAL: 454 MS
EKG QRS DURATION: 86 MS
EKG QTC CALCULATION (BAZETT): 449 MS
EKG R AXIS: 4 DEGREES
EKG T AXIS: 9 DEGREES
EKG VENTRICULAR RATE: 59 BPM
EOSINOPHIL # BLD: 0.2 K/UL (ref 0–0.4)
EOSINOPHIL NFR BLD: 2 % (ref 0–7)
EPITH CASTS URNS QL MICRO: NORMAL /LPF
ERYTHROCYTE [DISTWIDTH] IN BLOOD BY AUTOMATED COUNT: 12.4 % (ref 11.5–14.5)
ETHANOL SERPL-MCNC: 99 MG/DL (ref 0–0.08)
GLOBULIN SER CALC-MCNC: 3.4 G/DL (ref 2–4)
GLUCOSE SERPL-MCNC: 80 MG/DL (ref 65–100)
GLUCOSE UR STRIP.AUTO-MCNC: NEGATIVE MG/DL
HCT VFR BLD AUTO: 35.9 % (ref 36.6–50.3)
HGB BLD-MCNC: 12.3 G/DL (ref 12.1–17)
HGB UR QL STRIP: NEGATIVE
IMM GRANULOCYTES # BLD AUTO: 0 K/UL (ref 0–0.04)
IMM GRANULOCYTES NFR BLD AUTO: 0 % (ref 0–0.5)
INR PPP: 1 (ref 0.9–1.1)
KETONES UR QL STRIP.AUTO: NEGATIVE MG/DL
LEUKOCYTE ESTERASE UR QL STRIP.AUTO: NEGATIVE
LIPASE SERPL-CCNC: 410 U/L (ref 13–75)
LYMPHOCYTES # BLD: 3.2 K/UL (ref 0.8–3.5)
LYMPHOCYTES NFR BLD: 41 % (ref 12–49)
MCH RBC QN AUTO: 33.1 PG (ref 26–34)
MCHC RBC AUTO-ENTMCNC: 34.3 G/DL (ref 30–36.5)
MCV RBC AUTO: 96.5 FL (ref 80–99)
MONOCYTES # BLD: 0.9 K/UL (ref 0–1)
MONOCYTES NFR BLD: 11 % (ref 5–13)
MUCOUS THREADS URNS QL MICRO: NEGATIVE /LPF
NEUTS SEG # BLD: 3.6 K/UL (ref 1.8–8)
NEUTS SEG NFR BLD: 45 % (ref 32–75)
NITRITE UR QL STRIP.AUTO: NEGATIVE
NRBC # BLD: 0 K/UL (ref 0–0.01)
NRBC BLD-RTO: 0 PER 100 WBC
PH UR STRIP: 6 (ref 5–8)
PLATELET # BLD AUTO: 162 K/UL (ref 150–400)
PMV BLD AUTO: 10.7 FL (ref 8.9–12.9)
POTASSIUM SERPL-SCNC: 3.4 MMOL/L (ref 3.5–5.1)
PROT SERPL-MCNC: 7 G/DL (ref 6.4–8.2)
PROT UR STRIP-MCNC: NEGATIVE MG/DL
PROTHROMBIN TIME: 13.1 SEC (ref 11.9–14.6)
RBC # BLD AUTO: 3.72 M/UL (ref 4.1–5.7)
RBC #/AREA URNS HPF: NORMAL /HPF (ref 0–5)
SODIUM SERPL-SCNC: 140 MMOL/L (ref 136–145)
SP GR UR REFRACTOMETRY: <1.005 (ref 1–1.03)
URINE CULTURE IF INDICATED: NORMAL
UROBILINOGEN UR QL STRIP.AUTO: 0.1 EU/DL (ref 0.1–1)
WBC # BLD AUTO: 7.9 K/UL (ref 4.1–11.1)
WBC URNS QL MICRO: NORMAL /HPF (ref 0–4)

## 2024-07-14 PROCEDURE — 80053 COMPREHEN METABOLIC PANEL: CPT

## 2024-07-14 PROCEDURE — 85610 PROTHROMBIN TIME: CPT

## 2024-07-14 PROCEDURE — 96375 TX/PRO/DX INJ NEW DRUG ADDON: CPT

## 2024-07-14 PROCEDURE — 96374 THER/PROPH/DIAG INJ IV PUSH: CPT

## 2024-07-14 PROCEDURE — 85025 COMPLETE CBC W/AUTO DIFF WBC: CPT

## 2024-07-14 PROCEDURE — 83690 ASSAY OF LIPASE: CPT

## 2024-07-14 PROCEDURE — 6360000002 HC RX W HCPCS

## 2024-07-14 PROCEDURE — 99284 EMERGENCY DEPT VISIT MOD MDM: CPT

## 2024-07-14 PROCEDURE — 81001 URINALYSIS AUTO W/SCOPE: CPT

## 2024-07-14 PROCEDURE — 82077 ASSAY SPEC XCP UR&BREATH IA: CPT

## 2024-07-14 PROCEDURE — 36415 COLL VENOUS BLD VENIPUNCTURE: CPT

## 2024-07-14 PROCEDURE — 2580000003 HC RX 258

## 2024-07-14 PROCEDURE — 93005 ELECTROCARDIOGRAM TRACING: CPT | Performed by: STUDENT IN AN ORGANIZED HEALTH CARE EDUCATION/TRAINING PROGRAM

## 2024-07-14 RX ORDER — ONDANSETRON 4 MG/1
4 TABLET, FILM COATED ORAL EVERY 8 HOURS PRN
Qty: 20 TABLET | Refills: 0 | Status: SHIPPED | OUTPATIENT
Start: 2024-07-14

## 2024-07-14 RX ORDER — TRAMADOL HYDROCHLORIDE 50 MG/1
50 TABLET ORAL EVERY 6 HOURS PRN
Qty: 12 TABLET | Refills: 0 | Status: SHIPPED | OUTPATIENT
Start: 2024-07-14 | End: 2024-07-17

## 2024-07-14 RX ORDER — 0.9 % SODIUM CHLORIDE 0.9 %
1000 INTRAVENOUS SOLUTION INTRAVENOUS
Status: COMPLETED | OUTPATIENT
Start: 2024-07-14 | End: 2024-07-14

## 2024-07-14 RX ORDER — ONDANSETRON 2 MG/ML
4 INJECTION INTRAMUSCULAR; INTRAVENOUS ONCE
Status: COMPLETED | OUTPATIENT
Start: 2024-07-14 | End: 2024-07-14

## 2024-07-14 RX ORDER — PANTOPRAZOLE SODIUM 40 MG/10ML
40 INJECTION, POWDER, LYOPHILIZED, FOR SOLUTION INTRAVENOUS ONCE
Status: COMPLETED | OUTPATIENT
Start: 2024-07-14 | End: 2024-07-14

## 2024-07-14 RX ADMIN — ONDANSETRON 4 MG: 2 INJECTION INTRAMUSCULAR; INTRAVENOUS at 10:01

## 2024-07-14 RX ADMIN — PANTOPRAZOLE SODIUM 40 MG: 40 INJECTION, POWDER, FOR SOLUTION INTRAVENOUS at 10:03

## 2024-07-14 RX ADMIN — SODIUM CHLORIDE 1000 ML: 9 INJECTION, SOLUTION INTRAVENOUS at 11:38

## 2024-07-14 ASSESSMENT — PAIN SCALES - GENERAL
PAINLEVEL_OUTOF10: 4
PAINLEVEL_OUTOF10: 7
PAINLEVEL_OUTOF10: 0

## 2024-07-14 ASSESSMENT — PAIN DESCRIPTION - LOCATION
LOCATION: ABDOMEN
LOCATION: ABDOMEN

## 2024-07-14 ASSESSMENT — PAIN - FUNCTIONAL ASSESSMENT
PAIN_FUNCTIONAL_ASSESSMENT: 0-10
PAIN_FUNCTIONAL_ASSESSMENT: 0-10

## 2024-07-14 ASSESSMENT — LIFESTYLE VARIABLES
HOW MANY STANDARD DRINKS CONTAINING ALCOHOL DO YOU HAVE ON A TYPICAL DAY: 3 OR 4
HOW OFTEN DO YOU HAVE A DRINK CONTAINING ALCOHOL: 4 OR MORE TIMES A WEEK

## 2024-07-14 NOTE — ED TRIAGE NOTES
Patient states \"vomiting blood\" and feeling weak x 2 days. States has hx of Hepatitis B, is daily drinker and states just \" drank a little today to see if it helped me but it didn't work\".

## 2024-07-14 NOTE — ED PROVIDER NOTES
Mouth/Throat:      Mouth: Mucous membranes are moist.      Pharynx: Oropharynx is clear.   Eyes:      Extraocular Movements: Extraocular movements intact.      Conjunctiva/sclera: Conjunctivae normal.      Pupils: Pupils are equal, round, and reactive to light.   Cardiovascular:      Rate and Rhythm: Normal rate and regular rhythm.      Heart sounds: Normal heart sounds.   Pulmonary:      Effort: Pulmonary effort is normal.      Breath sounds: Normal breath sounds.   Abdominal:      General: Abdomen is flat.      Palpations: Abdomen is soft.      Tenderness: There is abdominal tenderness in the epigastric area and periumbilical area.   Musculoskeletal:         General: Normal range of motion.      Cervical back: Normal range of motion and neck supple.   Skin:     General: Skin is dry.   Neurological:      General: No focal deficit present.      Mental Status: He is alert and oriented to person, place, and time. Mental status is at baseline.   Psychiatric:         Mood and Affect: Mood normal.         Behavior: Behavior normal.         SCREENINGS                  LAB, EKG AND DIAGNOSTIC RESULTS   Labs:  Recent Results (from the past 12 hour(s))   CBC with Diff    Collection Time: 07/14/24  9:52 AM   Result Value Ref Range    WBC 7.9 4.1 - 11.1 K/uL    RBC 3.72 (L) 4.10 - 5.70 M/uL    Hemoglobin 12.3 12.1 - 17.0 g/dL    Hematocrit 35.9 (L) 36.6 - 50.3 %    MCV 96.5 80.0 - 99.0 FL    MCH 33.1 26.0 - 34.0 PG    MCHC 34.3 30.0 - 36.5 g/dL    RDW 12.4 11.5 - 14.5 %    Platelets 162 150 - 400 K/uL    MPV 10.7 8.9 - 12.9 FL    Nucleated RBCs 0.0 0.0  WBC    nRBC 0.00 0.00 - 0.01 K/uL    Neutrophils % 45 32 - 75 %    Lymphocytes % 41 12 - 49 %    Monocytes % 11 5 - 13 %    Eosinophils % 2 0 - 7 %    Basophils % 1 0 - 1 %    Immature Granulocytes % 0 0 - 0.5 %    Neutrophils Absolute 3.6 1.8 - 8.0 K/UL    Lymphocytes Absolute 3.2 0.8 - 3.5 K/UL    Monocytes Absolute 0.9 0.0 - 1.0 K/UL    Eosinophils Absolute 0.2 0.0 -

## 2024-07-14 NOTE — DISCHARGE INSTRUCTIONS
Bilirubin 0.5 0.2 - 1.0 mg/dL    AST 31 15 - 37 U/L    ALT 33 12 - 78 U/L    Alk Phosphatase 78 45 - 117 U/L    Total Protein 7.0 6.4 - 8.2 g/dL    Albumin 3.6 3.5 - 5.0 g/dL    Globulin 3.4 2.0 - 4.0 g/dL    Albumin/Globulin Ratio 1.1 1.1 - 2.2     Lipase    Collection Time: 07/14/24  9:52 AM   Result Value Ref Range    Lipase 410 (H) 13 - 75 U/L   Urinalysis with Reflex to Culture    Collection Time: 07/14/24  9:52 AM    Specimen: Urine   Result Value Ref Range    Color, UA Yellow/Straw      Appearance Clear Clear      Specific Gravity, UA <1.005 1.003 - 1.030    pH, Urine 6.0 5.0 - 8.0      Protein, UA Negative Negative mg/dL    Glucose, Ur Negative Negative mg/dL    Ketones, Urine Negative Negative mg/dL    Bilirubin, Urine Negative Negative      Blood, Urine Negative Negative      Urobilinogen, Urine 0.1 0.1 - 1.0 EU/dL    Nitrite, Urine Negative Negative      Leukocyte Esterase, Urine Negative Negative      WBC, UA 0-4 0 - 4 /hpf    RBC, UA 0-5 0 - 5 /hpf    Epithelial Cells, UA Few Few /lpf    BACTERIA, URINE Negative Negative /hpf    Urine Culture if Indicated Culture not indicated by UA result Culture not indicated by UA result      Mucus, UA Negative Negative /lpf   Ethanol    Collection Time: 07/14/24  9:52 AM   Result Value Ref Range    Ethanol Lvl 99 (H) <10 mg/dL   Protime-INR    Collection Time: 07/14/24 10:31 AM   Result Value Ref Range    Protime 13.1 11.9 - 14.6 sec    INR 1.0 0.9 - 1.1         Radiologic Studies  No orders to display     ------------------------------------------------------------------------------------------------------------  The evaluation and treatment you received in the Emergency Department were for an urgent problem. It is important that you follow-up with a doctor, nurse practitioner, or physician assistant to:  (1) confirm your diagnosis,  (2) re-evaluation of changes in your illness and treatment, and (3) for ongoing care. Please take your discharge instructions with

## 2024-07-14 NOTE — ED NOTES
Pt tolerated 200ml of water. Pt has no complaints of current nausea and states he is feeling better.

## 2024-08-25 NOTE — BSMART NOTE
Attempted to assess pt.  Unable to assess at this time, will attempt to assess when pt is more alert.   
Comprehensive Assessment Form Part 1      Section I - Disposition    Primary Diagnosis: Bipolar, PTSD       The Medical Doctor to Psychiatrist conference was notcompleted.  The Medical Doctor is in agreement with intake assessment.  The plan is D19 assessment.  The on-call Psychiatrist consulted was Dr. ROWLAND.  The admitting Psychiatrist will be Dr. zarate.  The admitting Diagnosis is tbd.    This writer reviewed the [place] Suicide Severity Rating Scale in nursing flowsheet and the risk level assigned is high risk.  Based on this assessment, the risk of suicide is moderate risk and the plan is see above.    BSMART assessment completed, and suicide risk level noted to be moderate. Primary Nurse Guido and Charge Nurse MORENA/RIOS and Physician Pj Beltrán notified. Concerns observed by this writer.             Section II - Integrated Summary  Summary:  This writer attempted to fully assess pt in ER 24.  This writer assessed pt in June 2023 when pt was subsequently TDO'd for similar behavior/presentation.  Pt dressed in green gown and appears documented age.  Pt pacing room throughout assessment.  Pt presents to the ER after self-reporting intentional lorazepam overdose.  RN states pt reports taking >10 pills sometime yesterday morning to intentionally harm self.  Pt has historical diagnosis of bipolar disorder and PTSD.  Pts provider and medication compliance/adherence is unknown.  Pt presents with erratic behavior and BSMART clinician was unable to assess during the night shift.  Pt alert however disoriented to all spheres.  Pt states \"I get mad and stay away.\" When this writer asked if pt knew where he was, pt states \"I clean the house.\"  Pt unable to answer questions appropriately and appears paranoid and is responding to internal stimuli.  This writer unable to complete full assessment and therefore D19 will be contacted to assess pt.    0715AM Jena with D19 aware of pt and clinical information sent to her per her 
Homero Gonzalez with [facility], pt accepted by Dr Curiel to 241/1.  Jena with D19 aware.  Pt may not be transported until TDO arrives.  Beverly CRUZ aware  
Jena with D19 completing prescreening assessment at this time via iPad/Zoom  
This writer attempted to  assess pt but was told by RN that he is not not fully alert. Will make another attempt prior to shift ending.   
Unable to assess at this time, as pt remains lethargic    
none

## 2024-09-20 ENCOUNTER — OFFICE VISIT (OUTPATIENT)
Facility: CLINIC | Age: 39
End: 2024-09-20
Payer: COMMERCIAL

## 2024-09-20 VITALS
HEIGHT: 76 IN | RESPIRATION RATE: 16 BRPM | WEIGHT: 208 LBS | BODY MASS INDEX: 25.33 KG/M2 | DIASTOLIC BLOOD PRESSURE: 71 MMHG | OXYGEN SATURATION: 100 % | TEMPERATURE: 98.7 F | HEART RATE: 83 BPM | SYSTOLIC BLOOD PRESSURE: 114 MMHG

## 2024-09-20 DIAGNOSIS — K85.90 PANCREATITIS, UNSPECIFIED PANCREATITIS TYPE: ICD-10-CM

## 2024-09-20 DIAGNOSIS — K70.30 ALCOHOLIC CIRRHOSIS, UNSPECIFIED WHETHER ASCITES PRESENT (HCC): ICD-10-CM

## 2024-09-20 DIAGNOSIS — K22.70 BARRETT'S ESOPHAGUS WITHOUT DYSPLASIA: Primary | ICD-10-CM

## 2024-09-20 DIAGNOSIS — Z13.31 POSITIVE DEPRESSION SCREENING: ICD-10-CM

## 2024-09-20 DIAGNOSIS — L98.9 SKIN LESION OF HAND: ICD-10-CM

## 2024-09-20 DIAGNOSIS — R73.9 HYPERGLYCEMIA: ICD-10-CM

## 2024-09-20 DIAGNOSIS — K21.9 GASTROESOPHAGEAL REFLUX DISEASE, UNSPECIFIED WHETHER ESOPHAGITIS PRESENT: ICD-10-CM

## 2024-09-20 DIAGNOSIS — S61.200A OPEN WOUND OF RIGHT INDEX FINGER: ICD-10-CM

## 2024-09-20 DIAGNOSIS — R61 EXCESSIVE SWEATING: ICD-10-CM

## 2024-09-20 DIAGNOSIS — F10.90 ALCOHOL USE DISORDER: ICD-10-CM

## 2024-09-20 PROCEDURE — 99214 OFFICE O/P EST MOD 30 MIN: CPT | Performed by: STUDENT IN AN ORGANIZED HEALTH CARE EDUCATION/TRAINING PROGRAM

## 2024-09-20 RX ORDER — BUSPIRONE HYDROCHLORIDE 5 MG/1
5 TABLET ORAL 3 TIMES DAILY
COMMUNITY
Start: 2024-09-01

## 2024-09-20 RX ORDER — THIAMINE MONONITRATE (VIT B1) 100 MG
100 TABLET ORAL DAILY
Qty: 90 TABLET | Refills: 1 | Status: SHIPPED | OUTPATIENT
Start: 2024-09-20

## 2024-09-20 RX ORDER — PANTOPRAZOLE SODIUM 40 MG/1
40 TABLET, DELAYED RELEASE ORAL
Qty: 90 TABLET | Refills: 1 | Status: SHIPPED | OUTPATIENT
Start: 2024-09-20

## 2024-09-20 SDOH — ECONOMIC STABILITY: INCOME INSECURITY: HOW HARD IS IT FOR YOU TO PAY FOR THE VERY BASICS LIKE FOOD, HOUSING, MEDICAL CARE, AND HEATING?: NOT VERY HARD

## 2024-09-20 SDOH — ECONOMIC STABILITY: FOOD INSECURITY: WITHIN THE PAST 12 MONTHS, THE FOOD YOU BOUGHT JUST DIDN'T LAST AND YOU DIDN'T HAVE MONEY TO GET MORE.: NEVER TRUE

## 2024-09-20 SDOH — ECONOMIC STABILITY: FOOD INSECURITY: WITHIN THE PAST 12 MONTHS, YOU WORRIED THAT YOUR FOOD WOULD RUN OUT BEFORE YOU GOT MONEY TO BUY MORE.: NEVER TRUE

## 2024-09-20 ASSESSMENT — ENCOUNTER SYMPTOMS
ABDOMINAL PAIN: 0
SHORTNESS OF BREATH: 0

## 2024-09-20 ASSESSMENT — PATIENT HEALTH QUESTIONNAIRE - PHQ9
SUM OF ALL RESPONSES TO PHQ9 QUESTIONS 1 & 2: 6
3. TROUBLE FALLING OR STAYING ASLEEP: NEARLY EVERY DAY
10. IF YOU CHECKED OFF ANY PROBLEMS, HOW DIFFICULT HAVE THESE PROBLEMS MADE IT FOR YOU TO DO YOUR WORK, TAKE CARE OF THINGS AT HOME, OR GET ALONG WITH OTHER PEOPLE: VERY DIFFICULT
7. TROUBLE CONCENTRATING ON THINGS, SUCH AS READING THE NEWSPAPER OR WATCHING TELEVISION: NEARLY EVERY DAY
SUM OF ALL RESPONSES TO PHQ QUESTIONS 1-9: 26
5. POOR APPETITE OR OVEREATING: NEARLY EVERY DAY
8. MOVING OR SPEAKING SO SLOWLY THAT OTHER PEOPLE COULD HAVE NOTICED. OR THE OPPOSITE, BEING SO FIGETY OR RESTLESS THAT YOU HAVE BEEN MOVING AROUND A LOT MORE THAN USUAL: MORE THAN HALF THE DAYS
SUM OF ALL RESPONSES TO PHQ QUESTIONS 1-9: 26
4. FEELING TIRED OR HAVING LITTLE ENERGY: NEARLY EVERY DAY
9. THOUGHTS THAT YOU WOULD BE BETTER OFF DEAD, OR OF HURTING YOURSELF: NEARLY EVERY DAY
1. LITTLE INTEREST OR PLEASURE IN DOING THINGS: NEARLY EVERY DAY
SUM OF ALL RESPONSES TO PHQ QUESTIONS 1-9: 23
6. FEELING BAD ABOUT YOURSELF - OR THAT YOU ARE A FAILURE OR HAVE LET YOURSELF OR YOUR FAMILY DOWN: NEARLY EVERY DAY
SUM OF ALL RESPONSES TO PHQ QUESTIONS 1-9: 26
2. FEELING DOWN, DEPRESSED OR HOPELESS: NEARLY EVERY DAY

## 2024-09-20 ASSESSMENT — COLUMBIA-SUICIDE SEVERITY RATING SCALE - C-SSRS
3. HAVE YOU BEEN THINKING ABOUT HOW YOU MIGHT KILL YOURSELF?: YES
6. HAVE YOU EVER DONE ANYTHING, STARTED TO DO ANYTHING, OR PREPARED TO DO ANYTHING TO END YOUR LIFE?: YES
1. WITHIN THE PAST MONTH, HAVE YOU WISHED YOU WERE DEAD OR WISHED YOU COULD GO TO SLEEP AND NOT WAKE UP?: YES
5. HAVE YOU STARTED TO WORK OUT OR WORKED OUT THE DETAILS OF HOW TO KILL YOURSELF? DO YOU INTEND TO CARRY OUT THIS PLAN?: YES
2. HAVE YOU ACTUALLY HAD ANY THOUGHTS OF KILLING YOURSELF?: YES
4. HAVE YOU HAD THESE THOUGHTS AND HAD SOME INTENTION OF ACTING ON THEM?: YES

## 2024-09-21 LAB
ALBUMIN SERPL-MCNC: 4.5 G/DL (ref 4.1–5.1)
ALP SERPL-CCNC: 84 IU/L (ref 44–121)
ALT SERPL-CCNC: 29 IU/L (ref 0–44)
AST SERPL-CCNC: 31 IU/L (ref 0–40)
BILIRUB SERPL-MCNC: 0.4 MG/DL (ref 0–1.2)
BUN SERPL-MCNC: 11 MG/DL (ref 6–20)
BUN/CREAT SERPL: 11 (ref 9–20)
CALCIUM SERPL-MCNC: 9.8 MG/DL (ref 8.7–10.2)
CHLORIDE SERPL-SCNC: 100 MMOL/L (ref 96–106)
CHOLEST SERPL-MCNC: 200 MG/DL (ref 100–199)
CO2 SERPL-SCNC: 22 MMOL/L (ref 20–29)
CREAT SERPL-MCNC: 1.03 MG/DL (ref 0.76–1.27)
EGFRCR SERPLBLD CKD-EPI 2021: 96 ML/MIN/1.73
ERYTHROCYTE [DISTWIDTH] IN BLOOD BY AUTOMATED COUNT: 11.9 % (ref 11.6–15.4)
GLOBULIN SER CALC-MCNC: 3.1 G/DL (ref 1.5–4.5)
GLUCOSE SERPL-MCNC: 65 MG/DL (ref 70–99)
HBA1C MFR BLD: 5 % (ref 4.8–5.6)
HCT VFR BLD AUTO: 42.6 % (ref 37.5–51)
HDLC SERPL-MCNC: 80 MG/DL
HGB BLD-MCNC: 13.5 G/DL (ref 13–17.7)
HIV 1+2 AB+HIV1 P24 AG SERPL QL IA: NON REACTIVE
LDLC SERPL CALC-MCNC: 106 MG/DL (ref 0–99)
MCH RBC QN AUTO: 32.8 PG (ref 26.6–33)
MCHC RBC AUTO-ENTMCNC: 31.7 G/DL (ref 31.5–35.7)
MCV RBC AUTO: 104 FL (ref 79–97)
PLATELET # BLD AUTO: 191 X10E3/UL (ref 150–450)
POTASSIUM SERPL-SCNC: 3.9 MMOL/L (ref 3.5–5.2)
PROT SERPL-MCNC: 7.6 G/DL (ref 6–8.5)
RBC # BLD AUTO: 4.11 X10E6/UL (ref 4.14–5.8)
SODIUM SERPL-SCNC: 140 MMOL/L (ref 134–144)
T4 FREE SERPL-MCNC: 1.03 NG/DL (ref 0.82–1.77)
TRIGL SERPL-MCNC: 77 MG/DL (ref 0–149)
TSH SERPL DL<=0.005 MIU/L-ACNC: 3.72 UIU/ML (ref 0.45–4.5)
VLDLC SERPL CALC-MCNC: 14 MG/DL (ref 5–40)
WBC # BLD AUTO: 7 X10E3/UL (ref 3.4–10.8)

## 2024-09-23 ENCOUNTER — CLINICAL DOCUMENTATION (OUTPATIENT)
Age: 39
End: 2024-09-23

## 2024-09-23 ENCOUNTER — HOSPITAL ENCOUNTER (INPATIENT)
Facility: HOSPITAL | Age: 39
Discharge: HOME | End: 2024-09-26
Payer: COMMERCIAL

## 2024-09-23 DIAGNOSIS — L98.9 SKIN LESION OF HAND: ICD-10-CM

## 2024-09-23 DIAGNOSIS — S61.200A OPEN WOUND OF RIGHT INDEX FINGER: ICD-10-CM

## 2024-09-23 PROCEDURE — 73130 X-RAY EXAM OF HAND: CPT

## 2024-12-05 ENCOUNTER — PATIENT MESSAGE (OUTPATIENT)
Facility: CLINIC | Age: 39
End: 2024-12-05

## 2025-04-21 ENCOUNTER — HOSPITAL ENCOUNTER (EMERGENCY)
Facility: HOSPITAL | Age: 40
Discharge: HOME | End: 2025-04-21
Payer: COMMERCIAL

## 2025-04-21 VITALS
WEIGHT: 200 LBS | HEIGHT: 76 IN | DIASTOLIC BLOOD PRESSURE: 70 MMHG | BODY MASS INDEX: 24.36 KG/M2 | OXYGEN SATURATION: 96 % | HEART RATE: 65 BPM | TEMPERATURE: 98.4 F | RESPIRATION RATE: 18 BRPM | SYSTOLIC BLOOD PRESSURE: 123 MMHG

## 2025-04-21 DIAGNOSIS — H57.8A1 FOREIGN BODY SENSATION, RIGHT EYE: ICD-10-CM

## 2025-04-21 DIAGNOSIS — H53.8 BLURRY VISION: Primary | ICD-10-CM

## 2025-04-21 PROCEDURE — 6370000000 HC RX 637 (ALT 250 FOR IP)

## 2025-04-21 PROCEDURE — 99283 EMERGENCY DEPT VISIT LOW MDM: CPT

## 2025-04-21 RX ADMIN — FLUORESCEIN SODIUM 1 MG: 1 STRIP OPHTHALMIC at 09:13

## 2025-04-21 ASSESSMENT — PAIN SCALES - GENERAL: PAINLEVEL_OUTOF10: 0

## 2025-04-21 ASSESSMENT — PAIN - FUNCTIONAL ASSESSMENT: PAIN_FUNCTIONAL_ASSESSMENT: NONE - DENIES PAIN

## 2025-04-21 NOTE — ED TRIAGE NOTES
PER PT: Pt came from home. Pt states he feels a lump on his right eye, blurred vision in right eye. Pt noticed lump on sclera a year ago.

## 2025-04-21 NOTE — ED NOTES
R eye 20/200.. Pt states he was only able to read the top line of the visual acuity chart with both eyes.

## 2025-04-22 NOTE — ED PROVIDER NOTES
[facility] EMERGENCY DEPT  EMERGENCY DEPARTMENT HISTORY AND PHYSICAL EXAM      Date of evaluation: 4/21/2025  Patient Name: Rudi Levine Jr.  Birthdate 1985  MRN: [id number]  ED Provider: Elda Thacker MD   Note Started: 1:57 PM EDT 4/22/25    HISTORY OF PRESENT ILLNESS     Chief Complaint   Patient presents with    Eye Problem       History Provided By: Patient     HPI: Rudi Levine Jr. is a 40 y.o. male with pmhx as reviewed below who presents with eye problem. Pt reports he has had blurry vision in his right eye for 1 year. Over the last few days had a FB sensation in his eye and decided to come in. Also reports a bump on his eyelid which has also been present for a year. Denies any trauma to the eye. He is supposed to be wearing corrective lenses but does not have them with him, he has not had an eye exam in many years.     PAST MEDICAL HISTORY   Past Medical History:  Past Medical History:   Diagnosis Date    Anxiety     Everett's esophagus 6/11/2020    Bipolar 1 disorder (HCC)     Cannabis abuse 6/24/2020    Cirrhosis, alcoholic (HCC)     Depression     Gastritis 6/24/2020    GERD (gastroesophageal reflux disease)     Hepatitis B     Perianal pain 6/11/2020    Thyroid disease     Thyroid function test abnormal 6/24/2020       Past Surgical History:  Past Surgical History:   Procedure Laterality Date    COLONOSCOPY      GI      Biopsy of liver    UPPER GASTROINTESTINAL ENDOSCOPY      UPPER GASTROINTESTINAL ENDOSCOPY N/A 10/3/2023    EGD ESOPHAGOGASTRODUODENOSCOPY performed by Shonna Greene MD at [facility] ENDOSCOPY    US BIOPSY LIVER PERCUTANEOUS  7/19/2017    US GUIDED LIVER BIOPSY PERCUTANEOUS 7/19/2017 [facility] RAD US       Family History:  Family History   Problem Relation Age of Onset    Cancer Maternal Grandmother     Cancer Father     Asthma Mother     Diabetes Mother     Hypertension Mother        Social History:  Social History     Tobacco Use    Smoking status: Every Day     Current packs/day: 0.50

## 2025-05-20 NOTE — BH NOTES
Patient observed in the claire briefly this evening before returning to his room . Patient presents as cooperative and pleasant. Patient denies SI, HI, A/V hallucinations. No report of pain or discomfort. No report of anxiety or depression. Patient is medication compliant. Medication is effective; patient observed resting quietly with this eyes closed. Will continue to monitor for safety. Please Approve or Refuse.  Send to Pharmacy per Pt's Request:      Next Visit Date:  6/16/2025   Last Visit Date: 3/4/2025    Hemoglobin A1C (%)   Date Value   03/04/2025 8.2 (H)   12/04/2024 7.6   08/09/2024 7.7 (H)             ( goal A1C is < 7)   BP Readings from Last 3 Encounters:   05/06/25 (!) 160/88   03/04/25 120/70   12/04/24 118/76          (goal 120/80)  BUN   Date Value Ref Range Status   08/29/2024 19 8 - 23 mg/dL Final     Creatinine   Date Value Ref Range Status   08/29/2024 1.1 0.7 - 1.2 mg/dL Final     Potassium   Date Value Ref Range Status   08/29/2024 5.1 3.7 - 5.3 mmol/L Final

## 2025-07-11 ENCOUNTER — OFFICE VISIT (OUTPATIENT)
Facility: CLINIC | Age: 40
End: 2025-07-11

## 2025-07-11 VITALS
BODY MASS INDEX: 24.96 KG/M2 | OXYGEN SATURATION: 98 % | HEIGHT: 76 IN | SYSTOLIC BLOOD PRESSURE: 124 MMHG | HEART RATE: 85 BPM | RESPIRATION RATE: 16 BRPM | TEMPERATURE: 98.4 F | DIASTOLIC BLOOD PRESSURE: 80 MMHG | WEIGHT: 205 LBS

## 2025-07-11 DIAGNOSIS — K70.30 ALCOHOLIC CIRRHOSIS, UNSPECIFIED WHETHER ASCITES PRESENT (HCC): ICD-10-CM

## 2025-07-11 DIAGNOSIS — K70.30 ALCOHOLIC CIRRHOSIS, UNSPECIFIED WHETHER ASCITES PRESENT (HCC): Primary | ICD-10-CM

## 2025-07-11 DIAGNOSIS — L29.9 PRURITUS: ICD-10-CM

## 2025-07-11 RX ORDER — HYDROXYZINE HYDROCHLORIDE 50 MG/1
50 TABLET, FILM COATED ORAL EVERY 6 HOURS PRN
Qty: 90 TABLET | Refills: 1 | Status: SHIPPED | OUTPATIENT
Start: 2025-07-11 | End: 2025-09-09

## 2025-07-11 SDOH — ECONOMIC STABILITY: FOOD INSECURITY: WITHIN THE PAST 12 MONTHS, THE FOOD YOU BOUGHT JUST DIDN'T LAST AND YOU DIDN'T HAVE MONEY TO GET MORE.: NEVER TRUE

## 2025-07-11 SDOH — ECONOMIC STABILITY: FOOD INSECURITY: WITHIN THE PAST 12 MONTHS, YOU WORRIED THAT YOUR FOOD WOULD RUN OUT BEFORE YOU GOT MONEY TO BUY MORE.: NEVER TRUE

## 2025-07-11 ASSESSMENT — PATIENT HEALTH QUESTIONNAIRE - PHQ9
SUM OF ALL RESPONSES TO PHQ QUESTIONS 1-9: 0
9. THOUGHTS THAT YOU WOULD BE BETTER OFF DEAD, OR OF HURTING YOURSELF: NOT AT ALL
6. FEELING BAD ABOUT YOURSELF - OR THAT YOU ARE A FAILURE OR HAVE LET YOURSELF OR YOUR FAMILY DOWN: NOT AT ALL
10. IF YOU CHECKED OFF ANY PROBLEMS, HOW DIFFICULT HAVE THESE PROBLEMS MADE IT FOR YOU TO DO YOUR WORK, TAKE CARE OF THINGS AT HOME, OR GET ALONG WITH OTHER PEOPLE: NOT DIFFICULT AT ALL
2. FEELING DOWN, DEPRESSED OR HOPELESS: NOT AT ALL
8. MOVING OR SPEAKING SO SLOWLY THAT OTHER PEOPLE COULD HAVE NOTICED. OR THE OPPOSITE, BEING SO FIGETY OR RESTLESS THAT YOU HAVE BEEN MOVING AROUND A LOT MORE THAN USUAL: NOT AT ALL
7. TROUBLE CONCENTRATING ON THINGS, SUCH AS READING THE NEWSPAPER OR WATCHING TELEVISION: NOT AT ALL
SUM OF ALL RESPONSES TO PHQ QUESTIONS 1-9: 0
4. FEELING TIRED OR HAVING LITTLE ENERGY: NOT AT ALL
1. LITTLE INTEREST OR PLEASURE IN DOING THINGS: NOT AT ALL
5. POOR APPETITE OR OVEREATING: NOT AT ALL
3. TROUBLE FALLING OR STAYING ASLEEP: NOT AT ALL

## 2025-07-11 NOTE — PROGRESS NOTES
Chief Complaint   Patient presents with    Rash     Itchy all over        /80 (BP Site: Left Upper Arm, Patient Position: Sitting)   Pulse 85   Temp 98.4 °F (36.9 °C) (Oral)   Resp 16   Ht 1.93 m (6' 4\")   Wt 93 kg (205 lb)   SpO2 98%   BMI 24.95 kg/m²         Have you been to the ER, urgent care clinic since your last visit?  Hospitalized since your last visit?   YES - When: approximately 4 months ago.  Where and Why: blurred vision .    Have you seen or consulted any other health care providers outside our system since your last visit?   NO

## 2025-07-11 NOTE — PROGRESS NOTES
Subjective  Chief Complaint   Patient presents with    Rash     Itchy all over      HPI:  Rudi Levine Jr. is a 40 y.o. male with medical history as reviewed and included.     Patient presents to the clinic for an acute care visit for evaluation of worsening generalized pruritus.  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  Patient and mother present during clinical evaluation voicing concerns regarding history of liver disease and continued EtOH dependency.  Patient reports full body itching unrelieved with lotions or over-the-counter topical ointments.  Prescribing patient as needed hydroxyzine, and topical urea.  Referring patient to [facility] [place] for additional evaluation and treatment recommendations as appropriate.  Obtaining current serum laboratory values including hepatic panel, CBC with differential, CMP.    Patient presenting with slurred speech and unsteady gait during time of evaluation, pleasant however with occasional outbursts regarding mothers descriptions of home behaviors when under the influence.  Discussed with patient current position regarding seeking counseling services for substance dependency, referring patient for psychiatric support as patient believes much of his dependency is related to failed coping mechanisms during times of stress including personal and social economic hardships.       Review of Systems   Constitutional:  Negative for appetite change, chills and fatigue.   HENT:  Negative for congestion, nosebleeds, sinus pain and trouble swallowing.    Eyes:  Negative for redness and visual disturbance.   Respiratory:  Negative for cough, chest tightness, shortness of breath and wheezing.    Cardiovascular:  Negative for chest pain, palpitations and leg swelling.   Gastrointestinal:  Negative for abdominal distention, abdominal pain, constipation, nausea and vomiting.   Endocrine: Negative for cold intolerance and heat intolerance.   Musculoskeletal:

## 2025-07-11 NOTE — ASSESSMENT & PLAN NOTE
Chronic, worsening (exacerbation), continue current plan pending work up below, medication adherence emphasized, and lifestyle modifications recommended    Orders:    Referral to Substance Abuse Resources    [facility] - Melba Curiel MD, Psychiatry, [place]    Hepatic Function Panel; Future    Comprehensive Metabolic Panel; Future    CBC with Auto Differential; Future    [facility]

## 2025-07-12 LAB
ALBUMIN SERPL-MCNC: 4.8 G/DL (ref 4.1–5.1)
ALP SERPL-CCNC: 72 IU/L (ref 44–121)
ALT SERPL-CCNC: 31 IU/L (ref 0–44)
AST SERPL-CCNC: 25 IU/L (ref 0–40)
BASOPHILS # BLD AUTO: 0 X10E3/UL (ref 0–0.2)
BASOPHILS NFR BLD AUTO: 0 %
BILIRUB DIRECT SERPL-MCNC: 0.15 MG/DL (ref 0–0.4)
BILIRUB SERPL-MCNC: 0.4 MG/DL (ref 0–1.2)
BUN SERPL-MCNC: 7 MG/DL (ref 6–20)
BUN/CREAT SERPL: 7 (ref 9–20)
CALCIUM SERPL-MCNC: 9.3 MG/DL (ref 8.7–10.2)
CHLORIDE SERPL-SCNC: 102 MMOL/L (ref 96–106)
CO2 SERPL-SCNC: 22 MMOL/L (ref 20–29)
CREAT SERPL-MCNC: 0.97 MG/DL (ref 0.76–1.27)
EGFRCR SERPLBLD CKD-EPI 2021: 102 ML/MIN/1.73
EOSINOPHIL # BLD AUTO: 0.3 X10E3/UL (ref 0–0.4)
EOSINOPHIL NFR BLD AUTO: 4 %
ERYTHROCYTE [DISTWIDTH] IN BLOOD BY AUTOMATED COUNT: 12.1 % (ref 11.6–15.4)
GLOBULIN SER CALC-MCNC: 2.7 G/DL (ref 1.5–4.5)
GLUCOSE SERPL-MCNC: 85 MG/DL (ref 70–99)
HCT VFR BLD AUTO: 44.9 % (ref 37.5–51)
HGB BLD-MCNC: 14.1 G/DL (ref 13–17.7)
IMM GRANULOCYTES # BLD AUTO: 0 X10E3/UL (ref 0–0.1)
IMM GRANULOCYTES NFR BLD AUTO: 0 %
LYMPHOCYTES # BLD AUTO: 3.2 X10E3/UL (ref 0.7–3.1)
LYMPHOCYTES NFR BLD AUTO: 42 %
MCH RBC QN AUTO: 32.8 PG (ref 26.6–33)
MCHC RBC AUTO-ENTMCNC: 31.4 G/DL (ref 31.5–35.7)
MCV RBC AUTO: 104 FL (ref 79–97)
MONOCYTES # BLD AUTO: 0.8 X10E3/UL (ref 0.1–0.9)
MONOCYTES NFR BLD AUTO: 11 %
NEUTROPHILS # BLD AUTO: 3.3 X10E3/UL (ref 1.4–7)
NEUTROPHILS NFR BLD AUTO: 43 %
PLATELET # BLD AUTO: 192 X10E3/UL (ref 150–450)
POTASSIUM SERPL-SCNC: 4 MMOL/L (ref 3.5–5.2)
PROT SERPL-MCNC: 7.5 G/DL (ref 6–8.5)
RBC # BLD AUTO: 4.3 X10E6/UL (ref 4.14–5.8)
SODIUM SERPL-SCNC: 141 MMOL/L (ref 134–144)
WBC # BLD AUTO: 7.6 X10E3/UL (ref 3.4–10.8)

## 2025-07-28 PROBLEM — L29.9 PRURITUS: Status: ACTIVE | Noted: 2025-07-28

## 2025-07-28 NOTE — ASSESSMENT & PLAN NOTE
Chronic, worsening (exacerbation), changes made today: Hydroxyzine as needed, topical urea, medication adherence emphasized, and lifestyle modifications recommended    Orders:    [facility] - [facility] [place]    hydrOXYzine HCl (ATARAX) 50 MG tablet; Take 1 tablet by mouth every 6 hours as needed for Itching    urea (CARMOL) 20 % lotion; Apply topically as needed.

## (undated) DEVICE — CANNULA NSL O2 AD 7 FT END-TIDAL CARBON DIOX VENTFLO

## (undated) DEVICE — MOUTHPIECE ENDOSCP L CTRL OPN AND SIDE PORTS DISP

## (undated) DEVICE — KIT COLON WITH 1.1 OZ ORCA HYDRA SEAL 2 GOWN ADAPT SECONDARY